# Patient Record
Sex: FEMALE | Race: WHITE | NOT HISPANIC OR LATINO | Employment: UNEMPLOYED | URBAN - METROPOLITAN AREA
[De-identification: names, ages, dates, MRNs, and addresses within clinical notes are randomized per-mention and may not be internally consistent; named-entity substitution may affect disease eponyms.]

---

## 2020-12-15 ENCOUNTER — APPOINTMENT (OUTPATIENT)
Dept: GENERAL RADIOLOGY | Age: 55
End: 2020-12-15
Attending: EMERGENCY MEDICINE

## 2020-12-15 ENCOUNTER — HOSPITAL ENCOUNTER (OUTPATIENT)
Age: 55
Discharge: HOME | End: 2020-12-15
Attending: EMERGENCY MEDICINE

## 2020-12-15 VITALS
BODY MASS INDEX: 26.33 KG/M2 | SYSTOLIC BLOOD PRESSURE: 140 MMHG | WEIGHT: 158 LBS | DIASTOLIC BLOOD PRESSURE: 72 MMHG | OXYGEN SATURATION: 97 % | HEIGHT: 65 IN | HEART RATE: 100 BPM | TEMPERATURE: 98.8 F | RESPIRATION RATE: 20 BRPM

## 2020-12-15 DIAGNOSIS — S40.012A CONTUSION OF LEFT SHOULDER, INITIAL ENCOUNTER: Primary | ICD-10-CM

## 2020-12-15 PROCEDURE — A4565 SLINGS: HCPCS | Performed by: EMERGENCY MEDICINE

## 2020-12-15 PROCEDURE — 99201 HB OP SERV MINOR ACUITY-NEW PT: CPT

## 2020-12-15 PROCEDURE — 73060 X-RAY EXAM OF HUMERUS: CPT

## 2020-12-15 PROCEDURE — 99203 OFFICE O/P NEW LOW 30 MIN: CPT | Performed by: EMERGENCY MEDICINE

## 2020-12-15 PROCEDURE — 73030 X-RAY EXAM OF SHOULDER: CPT

## 2020-12-15 RX ORDER — HYDROCODONE BITARTRATE AND ACETAMINOPHEN 5; 325 MG/1; MG/1
1-2 TABLET ORAL EVERY 4 HOURS PRN
Qty: 12 TABLET | Refills: 0 | Status: SHIPPED | OUTPATIENT
Start: 2020-12-15

## 2020-12-15 ASSESSMENT — PAIN SCALES - GENERAL: PAINLEVEL_OUTOF10: 9

## 2020-12-15 ASSESSMENT — PAIN DESCRIPTION - PAIN TYPE: TYPE: ACUTE PAIN

## 2023-08-14 ENCOUNTER — DOCUMENTATION ONLY (OUTPATIENT)
Dept: FAMILY MEDICINE | Facility: CLINIC | Age: 58
End: 2023-08-14
Payer: COMMERCIAL

## 2023-08-14 RX ORDER — FUROSEMIDE 40 MG/1
40 TABLET ORAL 4 TIMES DAILY
COMMUNITY
End: 2023-09-12 | Stop reason: SDUPTHER

## 2023-08-14 RX ORDER — METOPROLOL SUCCINATE 25 MG/1
25 TABLET, EXTENDED RELEASE ORAL 2 TIMES DAILY
COMMUNITY
End: 2023-09-12 | Stop reason: SDUPTHER

## 2023-08-14 RX ORDER — GABAPENTIN 800 MG/1
800 TABLET ORAL 4 TIMES DAILY
COMMUNITY
End: 2023-09-12 | Stop reason: SDUPTHER

## 2023-08-14 RX ORDER — LISINOPRIL 5 MG/1
5 TABLET ORAL DAILY
COMMUNITY
End: 2023-09-12 | Stop reason: SDUPTHER

## 2023-08-14 RX ORDER — CLOPIDOGREL BISULFATE 75 MG/1
75 TABLET ORAL DAILY
COMMUNITY
End: 2023-09-12 | Stop reason: SDUPTHER

## 2023-08-14 RX ORDER — ONDANSETRON 4 MG/1
8 TABLET, ORALLY DISINTEGRATING ORAL EVERY 6 HOURS PRN
COMMUNITY
End: 2023-09-18 | Stop reason: SDUPTHER

## 2023-08-14 RX ORDER — CYCLOBENZAPRINE HCL 10 MG
10 TABLET ORAL DAILY PRN
COMMUNITY
End: 2023-09-14 | Stop reason: SDUPTHER

## 2023-08-14 RX ORDER — CITALOPRAM 40 MG/1
40 TABLET, FILM COATED ORAL DAILY
COMMUNITY
End: 2023-09-12 | Stop reason: SDUPTHER

## 2023-08-14 RX ORDER — SEMAGLUTIDE 1.34 MG/ML
INJECTION, SOLUTION SUBCUTANEOUS
COMMUNITY
End: 2023-09-12 | Stop reason: SDUPTHER

## 2023-08-14 RX ORDER — EZETIMIBE 10 MG/1
10 TABLET ORAL DAILY
COMMUNITY
End: 2023-09-12 | Stop reason: SDUPTHER

## 2023-08-14 RX ORDER — TRAZODONE HYDROCHLORIDE 100 MG/1
100 TABLET ORAL NIGHTLY
COMMUNITY
End: 2023-09-12 | Stop reason: SDUPTHER

## 2023-09-12 DIAGNOSIS — E11.65 TYPE 2 DIABETES MELLITUS WITH HYPERGLYCEMIA, WITH LONG-TERM CURRENT USE OF INSULIN: Primary | ICD-10-CM

## 2023-09-12 DIAGNOSIS — Z79.4 TYPE 2 DIABETES MELLITUS WITH HYPERGLYCEMIA, WITH LONG-TERM CURRENT USE OF INSULIN: Primary | ICD-10-CM

## 2023-09-13 RX ORDER — CITALOPRAM 40 MG/1
40 TABLET, FILM COATED ORAL DAILY
Qty: 90 TABLET | Refills: 2 | Status: SHIPPED | OUTPATIENT
Start: 2023-09-13 | End: 2023-11-02 | Stop reason: SDUPTHER

## 2023-09-13 RX ORDER — GABAPENTIN 800 MG/1
800 TABLET ORAL 4 TIMES DAILY
Qty: 120 TABLET | Refills: 2 | Status: SHIPPED | OUTPATIENT
Start: 2023-09-13 | End: 2023-11-02 | Stop reason: SDUPTHER

## 2023-09-13 RX ORDER — FUROSEMIDE 40 MG/1
40 TABLET ORAL 4 TIMES DAILY
Qty: 120 TABLET | Refills: 2 | Status: SHIPPED | OUTPATIENT
Start: 2023-09-13 | End: 2023-11-02 | Stop reason: SDUPTHER

## 2023-09-13 RX ORDER — METOPROLOL SUCCINATE 25 MG/1
25 TABLET, EXTENDED RELEASE ORAL 2 TIMES DAILY
Qty: 180 TABLET | Refills: 2 | Status: SHIPPED | OUTPATIENT
Start: 2023-09-13 | End: 2023-11-02 | Stop reason: SDUPTHER

## 2023-09-13 RX ORDER — TRAZODONE HYDROCHLORIDE 100 MG/1
100 TABLET ORAL NIGHTLY
Qty: 90 TABLET | Refills: 2 | Status: SHIPPED | OUTPATIENT
Start: 2023-09-13 | End: 2023-11-02 | Stop reason: SDUPTHER

## 2023-09-13 RX ORDER — EZETIMIBE 10 MG/1
10 TABLET ORAL DAILY
Qty: 90 TABLET | Refills: 2 | Status: SHIPPED | OUTPATIENT
Start: 2023-09-13 | End: 2023-11-02 | Stop reason: SDUPTHER

## 2023-09-13 RX ORDER — INSULIN DEGLUDEC 100 U/ML
20 INJECTION, SOLUTION SUBCUTANEOUS NIGHTLY
Qty: 6 ML | Refills: 11 | Status: SHIPPED | OUTPATIENT
Start: 2023-09-13 | End: 2024-09-12

## 2023-09-13 RX ORDER — LISINOPRIL 5 MG/1
5 TABLET ORAL DAILY
Qty: 90 TABLET | Refills: 2 | Status: SHIPPED | OUTPATIENT
Start: 2023-09-13 | End: 2023-11-02 | Stop reason: SDUPTHER

## 2023-09-13 RX ORDER — CLOPIDOGREL BISULFATE 75 MG/1
75 TABLET ORAL DAILY
Qty: 90 TABLET | Refills: 2 | Status: SHIPPED | OUTPATIENT
Start: 2023-09-13 | End: 2023-11-02 | Stop reason: SDUPTHER

## 2023-09-13 RX ORDER — SEMAGLUTIDE 1.34 MG/ML
0.5 INJECTION, SOLUTION SUBCUTANEOUS
Qty: 2 EACH | Refills: 2 | Status: SHIPPED | OUTPATIENT
Start: 2023-09-13

## 2023-09-13 RX ORDER — INSULIN ASPART 100 [IU]/ML
INJECTION, SOLUTION INTRAVENOUS; SUBCUTANEOUS
Qty: 2 EACH | Refills: 2 | Status: SHIPPED | OUTPATIENT
Start: 2023-09-13 | End: 2024-03-15 | Stop reason: SDUPTHER

## 2023-09-14 RX ORDER — CYCLOBENZAPRINE HCL 10 MG
10 TABLET ORAL DAILY PRN
Qty: 90 TABLET | Refills: 2 | Status: SHIPPED | OUTPATIENT
Start: 2023-09-14 | End: 2023-11-07

## 2023-09-19 RX ORDER — ONDANSETRON 4 MG/1
8 TABLET, ORALLY DISINTEGRATING ORAL EVERY 6 HOURS PRN
Qty: 30 TABLET | Refills: 0 | Status: SHIPPED | OUTPATIENT
Start: 2023-09-19 | End: 2023-11-02 | Stop reason: SDUPTHER

## 2023-10-05 ENCOUNTER — TELEPHONE (OUTPATIENT)
Dept: FAMILY MEDICINE | Facility: CLINIC | Age: 58
End: 2023-10-05
Payer: MEDICARE

## 2023-10-05 DIAGNOSIS — B37.31 CANDIDIASIS OF GENITALIA IN FEMALE: Primary | ICD-10-CM

## 2023-10-05 RX ORDER — FLUCONAZOLE 150 MG/1
150 TABLET ORAL DAILY
Qty: 1 TABLET | Refills: 0 | Status: SHIPPED | OUTPATIENT
Start: 2023-10-05 | End: 2023-10-06

## 2023-10-13 ENCOUNTER — OFFICE VISIT (OUTPATIENT)
Dept: FAMILY MEDICINE | Facility: CLINIC | Age: 58
End: 2023-10-13
Payer: COMMERCIAL

## 2023-10-13 VITALS
OXYGEN SATURATION: 98 % | TEMPERATURE: 98 F | RESPIRATION RATE: 16 BRPM | HEART RATE: 92 BPM | BODY MASS INDEX: 33.27 KG/M2 | DIASTOLIC BLOOD PRESSURE: 60 MMHG | SYSTOLIC BLOOD PRESSURE: 120 MMHG | HEIGHT: 66 IN | WEIGHT: 207 LBS

## 2023-10-13 DIAGNOSIS — R30.0 DYSURIA: ICD-10-CM

## 2023-10-13 DIAGNOSIS — N30.01 ACUTE CYSTITIS WITH HEMATURIA: Primary | ICD-10-CM

## 2023-10-13 LAB
BILIRUB SERPL-MCNC: NEGATIVE MG/DL
BLOOD URINE, POC: NORMAL
CLARITY, POC UA: NORMAL
COLOR, POC UA: NORMAL
GLUCOSE UR QL STRIP: NORMAL
KETONES UR QL STRIP: NEGATIVE
LEUKOCYTE ESTERASE URINE, POC: NORMAL
NITRITE, POC UA: NEGATIVE
PH, POC UA: 6
PROTEIN, POC: NORMAL
SPECIFIC GRAVITY, POC UA: 1.01
UROBILINOGEN, POC UA: 0.2

## 2023-10-13 PROCEDURE — 1159F PR MEDICATION LIST DOCUMENTED IN MEDICAL RECORD: ICD-10-PCS | Mod: CPTII,,, | Performed by: NURSE PRACTITIONER

## 2023-10-13 PROCEDURE — 4010F PR ACE/ARB THEARPY RXD/TAKEN: ICD-10-PCS | Mod: CPTII,,, | Performed by: NURSE PRACTITIONER

## 2023-10-13 PROCEDURE — 3008F BODY MASS INDEX DOCD: CPT | Mod: CPTII,,, | Performed by: NURSE PRACTITIONER

## 2023-10-13 PROCEDURE — 87077 CULTURE AEROBIC IDENTIFY: CPT | Mod: ,,, | Performed by: CLINICAL MEDICAL LABORATORY

## 2023-10-13 PROCEDURE — 3078F DIAST BP <80 MM HG: CPT | Mod: CPTII,,, | Performed by: NURSE PRACTITIONER

## 2023-10-13 PROCEDURE — 96372 PR INJECTION,THERAP/PROPH/DIAG2ST, IM OR SUBCUT: ICD-10-PCS | Mod: ,,, | Performed by: NURSE PRACTITIONER

## 2023-10-13 PROCEDURE — 3008F PR BODY MASS INDEX (BMI) DOCUMENTED: ICD-10-PCS | Mod: CPTII,,, | Performed by: NURSE PRACTITIONER

## 2023-10-13 PROCEDURE — 3074F PR MOST RECENT SYSTOLIC BLOOD PRESSURE < 130 MM HG: ICD-10-PCS | Mod: CPTII,,, | Performed by: NURSE PRACTITIONER

## 2023-10-13 PROCEDURE — 87086 CULTURE, URINE: ICD-10-PCS | Mod: ,,, | Performed by: CLINICAL MEDICAL LABORATORY

## 2023-10-13 PROCEDURE — 81002 POCT URINE DIPSTICK WITHOUT MICROSCOPE: ICD-10-PCS | Mod: ,,, | Performed by: NURSE PRACTITIONER

## 2023-10-13 PROCEDURE — 87077 CULTURE, URINE: ICD-10-PCS | Mod: ,,, | Performed by: CLINICAL MEDICAL LABORATORY

## 2023-10-13 PROCEDURE — 1159F MED LIST DOCD IN RCRD: CPT | Mod: CPTII,,, | Performed by: NURSE PRACTITIONER

## 2023-10-13 PROCEDURE — 4010F ACE/ARB THERAPY RXD/TAKEN: CPT | Mod: CPTII,,, | Performed by: NURSE PRACTITIONER

## 2023-10-13 PROCEDURE — 99213 PR OFFICE/OUTPT VISIT, EST, LEVL III, 20-29 MIN: ICD-10-PCS | Mod: 25,,, | Performed by: NURSE PRACTITIONER

## 2023-10-13 PROCEDURE — 3074F SYST BP LT 130 MM HG: CPT | Mod: CPTII,,, | Performed by: NURSE PRACTITIONER

## 2023-10-13 PROCEDURE — 81002 URINALYSIS NONAUTO W/O SCOPE: CPT | Mod: ,,, | Performed by: NURSE PRACTITIONER

## 2023-10-13 PROCEDURE — 1160F PR REVIEW ALL MEDS BY PRESCRIBER/CLIN PHARMACIST DOCUMENTED: ICD-10-PCS | Mod: CPTII,,, | Performed by: NURSE PRACTITIONER

## 2023-10-13 PROCEDURE — 1160F RVW MEDS BY RX/DR IN RCRD: CPT | Mod: CPTII,,, | Performed by: NURSE PRACTITIONER

## 2023-10-13 PROCEDURE — 96372 THER/PROPH/DIAG INJ SC/IM: CPT | Mod: ,,, | Performed by: NURSE PRACTITIONER

## 2023-10-13 PROCEDURE — 99213 OFFICE O/P EST LOW 20 MIN: CPT | Mod: 25,,, | Performed by: NURSE PRACTITIONER

## 2023-10-13 PROCEDURE — 3078F PR MOST RECENT DIASTOLIC BLOOD PRESSURE < 80 MM HG: ICD-10-PCS | Mod: CPTII,,, | Performed by: NURSE PRACTITIONER

## 2023-10-13 PROCEDURE — 87086 URINE CULTURE/COLONY COUNT: CPT | Mod: ,,, | Performed by: CLINICAL MEDICAL LABORATORY

## 2023-10-13 RX ORDER — CEFTRIAXONE 1 G/1
1 INJECTION, POWDER, FOR SOLUTION INTRAMUSCULAR; INTRAVENOUS
Status: COMPLETED | OUTPATIENT
Start: 2023-10-13 | End: 2023-10-13

## 2023-10-13 RX ORDER — CYCLOBENZAPRINE HCL 10 MG
10 TABLET ORAL DAILY PRN
Qty: 90 TABLET | Refills: 2 | Status: CANCELLED | OUTPATIENT
Start: 2023-10-13

## 2023-10-13 RX ORDER — SULFAMETHOXAZOLE AND TRIMETHOPRIM 800; 160 MG/1; MG/1
1 TABLET ORAL 2 TIMES DAILY
Qty: 10 TABLET | Refills: 0 | Status: SHIPPED | OUTPATIENT
Start: 2023-10-13 | End: 2023-10-18

## 2023-10-13 RX ADMIN — CEFTRIAXONE 1 G: 1 INJECTION, POWDER, FOR SOLUTION INTRAMUSCULAR; INTRAVENOUS at 11:10

## 2023-10-13 NOTE — PROGRESS NOTES
"    Subjective:      Kirk Villar is a 57 y.o. female who complains of dysuria and pain in the lower abdomen. She has had symptoms for several days. Patient also complains of back pain. Patient denies fever and vaginal discharge. Patient does not have a history of recurrent UTI. Patient does not have a history of pyelonephritis.     Review of Systems  Pertinent items are noted in HPI.      Objective:      /60 (BP Location: Left arm, Patient Position: Sitting, BP Method: Large (Manual))   Pulse 92   Temp 98.2 °F (36.8 °C) (Oral)   Resp 16   Ht 5' 5.5" (1.664 m)   Wt 93.9 kg (207 lb)   SpO2 98%   BMI 33.92 kg/m²   General appearance: alert, appears stated age, and cooperative  Back: symmetric, no curvature. ROM normal. No CVA tenderness.  Lungs: clear to auscultation bilaterally  Heart: regular rate and rhythm, S1, S2 normal, no murmur, click, rub or gallop  Extremities: extremities normal, atraumatic, no cyanosis or edema  Skin: Skin color, texture, turgor normal. No rashes or lesions  Lymph nodes: Cervical, supraclavicular, and axillary nodes normal.  Neurologic: Alert and oriented X 3, normal strength and tone. Normal symmetric reflexes. Normal coordination and gait    Laboratory:   Urine dipstick:  see results .    Micro exam: not done.      Assessment:      Acute cystitis and UTI       Plan:      Medications: TMP/SMX.  Maintain adequate hydration.  Follow up if symptoms not improving, and as needed.   "

## 2023-10-13 NOTE — PATIENT INSTRUCTIONS
Rx as directed. Increase water intake. C&S sent, will call if results warrant a change in antibiotics. For females, always wipe front to back. RTC if worsening or persisting of symptoms after 48-72 hours of antibiotic therapy.      Thank you for enrolling in [facility]. Please follow the instructions below to securely access your online medical record. My allows you to send messages to your doctor, view your test results, renew your prescriptions, schedule appointments, and more.     How Do I Sign Up?  In your Internet browser, go to [email or web address].  In the lower right of the page, click the Sign Up Now link located under the New User? Title.  Enter your [facility] Access Code exactly as it appears below. You will not need to use this code after youve completed the sign-up process. If you do not sign up before the expiration date, you must request a new code.  [facility] Access Code: [email or web address]  Expires: 11/27/2023 10:47 AM    Enter Date of Birth (mm/dd/yyyy) as indicated and click the Next button. You will be taken to the next sign-up page.  Create a [facility] ID. This will be your new [facility] login ID and cannot be changed, so think of one that is secure and easy to remember.  Create a [facility] password.  Your password must be at least 8 characters long and contain at least 1 letter and 1 number.  You can change your password at any time.  Enter your Password Reset Question and Answer, then click the Next button.   Enter your e-mail address. You will receive e-mail notification when new information is available in [facility].  Click Sign Up. You can now view your medical record.     Additional Information  If you have questions, you can email [email or web address] or call [phone]  to talk to our [facility] staff. Remember, [facility] is NOT to be used for urgent needs. For medical emergencies, dial 911.

## 2023-10-15 LAB — UA COMPLETE W REFLEX CULTURE PNL UR: ABNORMAL

## 2023-10-18 DIAGNOSIS — N30.01 ACUTE CYSTITIS WITH HEMATURIA: Primary | ICD-10-CM

## 2023-10-18 RX ORDER — AMOXICILLIN 500 MG/1
500 TABLET, FILM COATED ORAL EVERY 12 HOURS
Qty: 14 TABLET | Refills: 0 | Status: SHIPPED | OUTPATIENT
Start: 2023-10-18 | End: 2023-10-25

## 2023-11-02 ENCOUNTER — OFFICE VISIT (OUTPATIENT)
Dept: FAMILY MEDICINE | Facility: CLINIC | Age: 58
End: 2023-11-02
Payer: COMMERCIAL

## 2023-11-02 VITALS
WEIGHT: 203 LBS | BODY MASS INDEX: 32.62 KG/M2 | RESPIRATION RATE: 16 BRPM | SYSTOLIC BLOOD PRESSURE: 110 MMHG | HEIGHT: 66 IN | DIASTOLIC BLOOD PRESSURE: 60 MMHG | OXYGEN SATURATION: 98 % | HEART RATE: 88 BPM | TEMPERATURE: 98 F

## 2023-11-02 DIAGNOSIS — H92.02 PAIN IN LEFT EAR: ICD-10-CM

## 2023-11-02 DIAGNOSIS — Z79.4 TYPE 2 DIABETES MELLITUS WITH HYPERGLYCEMIA, WITH LONG-TERM CURRENT USE OF INSULIN: Primary | ICD-10-CM

## 2023-11-02 DIAGNOSIS — R25.3 MUSCLE TWITCHING: ICD-10-CM

## 2023-11-02 DIAGNOSIS — E11.65 TYPE 2 DIABETES MELLITUS WITH HYPERGLYCEMIA, WITH LONG-TERM CURRENT USE OF INSULIN: Primary | ICD-10-CM

## 2023-11-02 PROCEDURE — 4010F ACE/ARB THERAPY RXD/TAKEN: CPT | Mod: CPTII,,, | Performed by: NURSE PRACTITIONER

## 2023-11-02 PROCEDURE — 3074F PR MOST RECENT SYSTOLIC BLOOD PRESSURE < 130 MM HG: ICD-10-PCS | Mod: CPTII,,, | Performed by: NURSE PRACTITIONER

## 2023-11-02 PROCEDURE — 3074F SYST BP LT 130 MM HG: CPT | Mod: CPTII,,, | Performed by: NURSE PRACTITIONER

## 2023-11-02 PROCEDURE — 99213 OFFICE O/P EST LOW 20 MIN: CPT | Mod: ,,, | Performed by: NURSE PRACTITIONER

## 2023-11-02 PROCEDURE — 3008F PR BODY MASS INDEX (BMI) DOCUMENTED: ICD-10-PCS | Mod: CPTII,,, | Performed by: NURSE PRACTITIONER

## 2023-11-02 PROCEDURE — 99213 PR OFFICE/OUTPT VISIT, EST, LEVL III, 20-29 MIN: ICD-10-PCS | Mod: ,,, | Performed by: NURSE PRACTITIONER

## 2023-11-02 PROCEDURE — 3078F DIAST BP <80 MM HG: CPT | Mod: CPTII,,, | Performed by: NURSE PRACTITIONER

## 2023-11-02 PROCEDURE — 4010F PR ACE/ARB THEARPY RXD/TAKEN: ICD-10-PCS | Mod: CPTII,,, | Performed by: NURSE PRACTITIONER

## 2023-11-02 PROCEDURE — 3078F PR MOST RECENT DIASTOLIC BLOOD PRESSURE < 80 MM HG: ICD-10-PCS | Mod: CPTII,,, | Performed by: NURSE PRACTITIONER

## 2023-11-02 PROCEDURE — 3008F BODY MASS INDEX DOCD: CPT | Mod: CPTII,,, | Performed by: NURSE PRACTITIONER

## 2023-11-02 RX ORDER — TRAZODONE HYDROCHLORIDE 100 MG/1
100 TABLET ORAL NIGHTLY
Qty: 90 TABLET | Refills: 2 | Status: SHIPPED | OUTPATIENT
Start: 2023-11-02 | End: 2024-03-15 | Stop reason: SDUPTHER

## 2023-11-02 RX ORDER — CLOPIDOGREL BISULFATE 75 MG/1
75 TABLET ORAL DAILY
Qty: 90 TABLET | Refills: 2 | Status: SHIPPED | OUTPATIENT
Start: 2023-11-02 | End: 2024-03-15 | Stop reason: SDUPTHER

## 2023-11-02 RX ORDER — FUROSEMIDE 40 MG/1
40 TABLET ORAL 4 TIMES DAILY
Qty: 120 TABLET | Refills: 2 | Status: SHIPPED | OUTPATIENT
Start: 2023-11-02 | End: 2024-02-28

## 2023-11-02 RX ORDER — LISINOPRIL 5 MG/1
5 TABLET ORAL DAILY
Qty: 90 TABLET | Refills: 2 | Status: SHIPPED | OUTPATIENT
Start: 2023-11-02 | End: 2024-03-12

## 2023-11-02 RX ORDER — CITALOPRAM 40 MG/1
40 TABLET, FILM COATED ORAL DAILY
Qty: 90 TABLET | Refills: 2 | Status: SHIPPED | OUTPATIENT
Start: 2023-11-02 | End: 2024-03-15 | Stop reason: SDUPTHER

## 2023-11-02 RX ORDER — EZETIMIBE 10 MG/1
10 TABLET ORAL DAILY
Qty: 90 TABLET | Refills: 2 | Status: SHIPPED | OUTPATIENT
Start: 2023-11-02 | End: 2024-03-15 | Stop reason: SDUPTHER

## 2023-11-02 RX ORDER — CLOPIDOGREL BISULFATE 75 MG/1
75 TABLET ORAL DAILY
Qty: 90 TABLET | Refills: 2 | Status: SHIPPED | OUTPATIENT
Start: 2023-11-02 | End: 2023-11-02 | Stop reason: SDUPTHER

## 2023-11-02 RX ORDER — ONDANSETRON 4 MG/1
8 TABLET, ORALLY DISINTEGRATING ORAL EVERY 6 HOURS PRN
Qty: 30 TABLET | Refills: 0 | Status: SHIPPED | OUTPATIENT
Start: 2023-11-02

## 2023-11-02 RX ORDER — METOPROLOL SUCCINATE 25 MG/1
25 TABLET, EXTENDED RELEASE ORAL 2 TIMES DAILY
Qty: 180 TABLET | Refills: 2 | Status: SHIPPED | OUTPATIENT
Start: 2023-11-02 | End: 2024-03-15 | Stop reason: SDUPTHER

## 2023-11-02 RX ORDER — GABAPENTIN 800 MG/1
800 TABLET ORAL 4 TIMES DAILY
Qty: 120 TABLET | Refills: 2 | Status: SHIPPED | OUTPATIENT
Start: 2023-11-02 | End: 2024-03-15 | Stop reason: SDUPTHER

## 2023-11-07 ENCOUNTER — OFFICE VISIT (OUTPATIENT)
Dept: FAMILY MEDICINE | Facility: CLINIC | Age: 58
End: 2023-11-07
Payer: COMMERCIAL

## 2023-11-07 VITALS
DIASTOLIC BLOOD PRESSURE: 68 MMHG | TEMPERATURE: 98 F | OXYGEN SATURATION: 97 % | SYSTOLIC BLOOD PRESSURE: 128 MMHG | HEART RATE: 84 BPM | BODY MASS INDEX: 32.14 KG/M2 | WEIGHT: 200 LBS | RESPIRATION RATE: 20 BRPM | HEIGHT: 66 IN

## 2023-11-07 DIAGNOSIS — F33.9 EPISODE OF RECURRENT MAJOR DEPRESSIVE DISORDER, UNSPECIFIED DEPRESSION EPISODE SEVERITY: ICD-10-CM

## 2023-11-07 DIAGNOSIS — F41.1 GENERALIZED ANXIETY DISORDER: Primary | ICD-10-CM

## 2023-11-07 LAB

## 2023-11-07 PROCEDURE — 99213 PR OFFICE/OUTPT VISIT, EST, LEVL III, 20-29 MIN: ICD-10-PCS | Mod: ,,,

## 2023-11-07 PROCEDURE — 3078F PR MOST RECENT DIASTOLIC BLOOD PRESSURE < 80 MM HG: ICD-10-PCS | Mod: CPTII,,,

## 2023-11-07 PROCEDURE — 4010F ACE/ARB THERAPY RXD/TAKEN: CPT | Mod: CPTII,,,

## 2023-11-07 PROCEDURE — 3078F DIAST BP <80 MM HG: CPT | Mod: CPTII,,,

## 2023-11-07 PROCEDURE — 4010F PR ACE/ARB THEARPY RXD/TAKEN: ICD-10-PCS | Mod: CPTII,,,

## 2023-11-07 PROCEDURE — 1159F MED LIST DOCD IN RCRD: CPT | Mod: CPTII,,,

## 2023-11-07 PROCEDURE — 3008F BODY MASS INDEX DOCD: CPT | Mod: CPTII,,,

## 2023-11-07 PROCEDURE — 1160F RVW MEDS BY RX/DR IN RCRD: CPT | Mod: CPTII,,,

## 2023-11-07 PROCEDURE — 1160F PR REVIEW ALL MEDS BY PRESCRIBER/CLIN PHARMACIST DOCUMENTED: ICD-10-PCS | Mod: CPTII,,,

## 2023-11-07 PROCEDURE — 80305 DRUG TEST PRSMV DIR OPT OBS: CPT | Mod: QW,,,

## 2023-11-07 PROCEDURE — 80305 POCT URINE DRUG SCREEN PRESUMP: ICD-10-PCS | Mod: QW,,,

## 2023-11-07 PROCEDURE — 3074F SYST BP LT 130 MM HG: CPT | Mod: CPTII,,,

## 2023-11-07 PROCEDURE — 99213 OFFICE O/P EST LOW 20 MIN: CPT | Mod: ,,,

## 2023-11-07 PROCEDURE — 3008F PR BODY MASS INDEX (BMI) DOCUMENTED: ICD-10-PCS | Mod: CPTII,,,

## 2023-11-07 PROCEDURE — 1159F PR MEDICATION LIST DOCUMENTED IN MEDICAL RECORD: ICD-10-PCS | Mod: CPTII,,,

## 2023-11-07 PROCEDURE — 3074F PR MOST RECENT SYSTOLIC BLOOD PRESSURE < 130 MM HG: ICD-10-PCS | Mod: CPTII,,,

## 2023-11-07 RX ORDER — ALPRAZOLAM 0.25 MG/1
0.25 TABLET ORAL 2 TIMES DAILY PRN
Qty: 60 TABLET | Refills: 0 | Status: SHIPPED | OUTPATIENT
Start: 2023-11-07 | End: 2023-12-07

## 2023-11-07 RX ORDER — FLUCONAZOLE 150 MG/1
150 TABLET ORAL DAILY
Qty: 2 TABLET | Refills: 0 | Status: SHIPPED | OUTPATIENT
Start: 2023-11-07 | End: 2023-11-09

## 2023-11-07 NOTE — PROGRESS NOTES
"Subjective     Patient ID: Kirk Villar is a 58 y.o. female.    Chief Complaint: Anxiety (Comes and goes with life events), Emesis (X 4-5 days), and Depression    MG is a 58 year old female that presents today for complaints of severe anxiety. Patient reports that she is going through a "bad breakup" from her current relationship. She reports not sleeping well and is not eating. She is tearful throughout the examination. She denies thoughts of self harm or thoughts to harm others. She took trazodone PRN for sleep. She is currently on celexa daily for major depressive disorder. She reports going through a divorce 15 years ago and experienced severe anxiety at that time as well. She reports that she was on xanax PRN for that time for a couple of weeks. Additionally, she has complaints of a yeast infection. She reports recently taking antibiotics for a UTI.    Anxiety  Symptoms include nervous/anxious behavior. Patient reports no chest pain, dizziness, palpitations or shortness of breath.       Emesis   Pertinent negatives include no abdominal pain, chest pain, chills, coughing, dizziness, fever, headaches or myalgias.   DepressionPatient presents with the following symptoms: nervousness/anxiety.  Patient is not experiencing: palpitations and shortness of breath.        Review of Systems   Constitutional:  Negative for activity change, appetite change, chills and fever.   HENT:  Negative for ear pain, hearing loss, trouble swallowing and voice change.    Eyes:  Negative for visual disturbance.   Respiratory:  Negative for apnea, cough, chest tightness and shortness of breath.    Cardiovascular:  Negative for chest pain, palpitations and leg swelling.   Gastrointestinal:  Positive for vomiting. Negative for abdominal pain, blood in stool, change in bowel habit and reflux.   Genitourinary:  Negative for bladder incontinence, difficulty urinating and flank pain.   Musculoskeletal:  Negative for back pain, gait " problem, joint swelling and myalgias.   Integumentary:  Negative for color change and pallor.   Neurological:  Negative for dizziness, weakness, numbness and headaches.   Psychiatric/Behavioral:  Positive for depression, dysphoric mood and sleep disturbance. The patient is nervous/anxious.           Objective     Physical Exam  Vitals and nursing note reviewed.   Constitutional:       Appearance: Normal appearance.   HENT:      Head: Normocephalic and atraumatic.      Nose: Nose normal.      Mouth/Throat:      Mouth: Mucous membranes are moist.   Eyes:      Extraocular Movements: Extraocular movements intact.      Conjunctiva/sclera: Conjunctivae normal.      Pupils: Pupils are equal, round, and reactive to light.   Cardiovascular:      Rate and Rhythm: Normal rate and regular rhythm.      Pulses: Normal pulses.      Heart sounds: Normal heart sounds.   Pulmonary:      Effort: Pulmonary effort is normal.      Breath sounds: Normal breath sounds.   Abdominal:      General: Abdomen is flat. Bowel sounds are normal.      Palpations: Abdomen is soft.   Musculoskeletal:         General: Normal range of motion.      Cervical back: Normal range of motion.   Skin:     General: Skin is warm and dry.   Neurological:      General: No focal deficit present.      Mental Status: She is alert and oriented to person, place, and time.   Psychiatric:         Mood and Affect: Mood is anxious and depressed. Affect is tearful.         Behavior: Behavior normal.         Thought Content: Thought content normal.          Assessment and Plan     1. Generalized anxiety disorder  -     ALPRAZolam (XANAX) 0.25 MG tablet; Take 1 tablet (0.25 mg total) by mouth 2 (two) times daily as needed for Anxiety.  Dispense: 60 tablet; Refill: 0  -     POCT Urine Drug Screen Presump    2. Episode of recurrent major depressive disorder, unspecified depression episode severity    Other orders  -     fluconazole (DIFLUCAN) 150 MG Tab; Take 1 tablet (150 mg  total) by mouth once daily. Take one tablet by mouth at onset of symptoms, may repeat in 72 hours if symptoms persist. for 2 doses  Dispense: 2 tablet; Refill: 0        UDS appropriate,  reviewed and appropriate  Short term use of xanax PRN for severe anxiety  Recommend CBT       Follow up in about 4 weeks (around 12/5/2023) for Follow up of Anxiety with SAUL Hairston.

## 2023-11-08 NOTE — PROGRESS NOTES
Anabell Mendez NP   [address] [place] [address]     PATIENT NAME: Kirk Villar  : 1965  DATE: 23  MRN: [id number]      Billing Provider: Anabell Mendez NP  Level of Service:   Patient PCP Information       Provider PCP Type    Primary Doctor No General            Reason for Visit / Chief Complaint: Medication Refill (She is c/o some jerking motions in her extremities.Still having left ear pain.          )       Update PCP  Update Chief Complaint         History of Present Illness / Problem Focused Workflow     Kirk Villar presents to the clinic with Medication Refill (She is c/o some jerking motions in her extremities.Still having left ear pain.          )     Medication Refill  Associated symptoms include fatigue. Pertinent negatives include no abdominal pain, change in bowel habit, chest pain, chills, coughing, fever, headaches, joint swelling, myalgias, numbness or weakness.     Patient presents today for medication refills. She also reports uncontrolled BG. She is out of ozempic sample and has lost the fax number to send in paperwork for patient assistance. She reports numbers being 400 fasting. She is not dieting or exercising. She reports she is only doing sliding scale insulin. She is not doing her tresiba. Upon inquiry, she states she did not know she was supposed to do that too.   She reports left ear pain. She is not taking any medication for her symptoms.   She reports random jerking motions in extremities. Denies pain. On and off.     Review of Systems     Review of Systems   Constitutional:  Positive for fatigue. Negative for activity change, appetite change, chills and fever.   HENT:  Positive for ear pain. Negative for hearing loss, trouble swallowing and voice change.    Eyes:  Negative for visual disturbance.   Respiratory:  Negative for apnea, cough, chest tightness and shortness of breath.    Cardiovascular:  Negative for chest pain, palpitations and leg swelling.    Gastrointestinal:  Negative for abdominal pain, blood in stool, change in bowel habit and reflux.   Genitourinary:  Negative for bladder incontinence, difficulty urinating and flank pain.   Musculoskeletal:  Negative for back pain, gait problem, joint swelling and myalgias.   Integumentary:  Negative for color change and pallor.   Neurological:  Negative for dizziness, weakness, numbness and headaches.   Psychiatric/Behavioral:  Negative for sleep disturbance. The patient is not nervous/anxious.         Medical / Social / Family History     Past Medical History:   Diagnosis Date    Depression     Diabetes mellitus, type 2        History reviewed. No pertinent surgical history.    Social History  Ms.  reports that she has quit smoking. Her smoking use included cigarettes. She has never used smokeless tobacco. She reports current alcohol use. She reports that she does not use drugs.    Family History  Ms.'s family history is not on file.    Medications and Allergies     Medications  Outpatient Medications Marked as Taking for the 11/2/23 encounter (Office Visit) with Anabell Mendez NP   Medication Sig Dispense Refill    blood sugar diagnostic (ACCU-CHEK GUIDE TEST STRIPS) Strp 1 strip by Misc.(Non-Drug; Combo Route) route 3 (three) times daily. 100 strip 11    insulin aspart U-100 (NOVOLOG FLEXPEN U-100 INSULIN) 100 unit/mL (3 mL) InPn pen Inject 5-10 minutes before meals per sliding scale 2 each 2    insulin degludec (TRESIBA FLEXTOUCH U-100) 100 unit/mL (3 mL) insulin pen Inject 20 Units into the skin every evening. 6 mL 11    rivaroxaban (XARELTO) 20 mg Tab Take 1 tablet (20 mg total) by mouth daily with dinner or evening meal. 90 tablet 2    semaglutide (OZEMPIC) 0.25 mg or 0.5 mg(2 mg/1.5 mL) pen injector Inject 0.5 mg into the skin every 7 days. 2 each 2    [DISCONTINUED] citalopram (CELEXA) 40 MG tablet Take 1 tablet (40 mg total) by mouth once daily. 90 tablet 2    [DISCONTINUED] clopidogreL (PLAVIX) 75 mg  "tablet Take 1 tablet (75 mg total) by mouth once daily. 90 tablet 2    [DISCONTINUED] cyclobenzaprine (FLEXERIL) 10 MG tablet Take 1 tablet (10 mg total) by mouth daily as needed for Muscle spasms. (Patient taking differently: Take 10 mg by mouth 2 (two) times daily as needed for Muscle spasms.) 90 tablet 2    [DISCONTINUED] ezetimibe (ZETIA) 10 mg tablet Take 1 tablet (10 mg total) by mouth once daily. 90 tablet 2    [DISCONTINUED] furosemide (LASIX) 40 MG tablet Take 1 tablet (40 mg total) by mouth 4 (four) times daily. 120 tablet 2    [DISCONTINUED] gabapentin (NEURONTIN) 800 MG tablet Take 1 tablet (800 mg total) by mouth 4 (four) times daily. 120 tablet 2    [DISCONTINUED] lisinopriL (PRINIVIL,ZESTRIL) 5 MG tablet Take 1 tablet (5 mg total) by mouth once daily. 90 tablet 2    [DISCONTINUED] metoprolol succinate (TOPROL-XL) 25 MG 24 hr tablet Take 1 tablet (25 mg total) by mouth 2 (two) times daily. 180 tablet 2    [DISCONTINUED] ondansetron (ZOFRAN-ODT) 4 MG TbDL Take 2 tablets (8 mg total) by mouth every 6 (six) hours as needed (nausea). 30 tablet 0    [DISCONTINUED] traZODone (DESYREL) 100 MG tablet Take 1 tablet (100 mg total) by mouth every evening. 90 tablet 2       Allergies  Review of patient's allergies indicates:  No Known Allergies    Physical Examination   /60 (BP Location: Left arm, Patient Position: Sitting, BP Method: Large (Manual))   Pulse 88   Temp 97.8 °F (36.6 °C) (Oral)   Resp 16   Ht 5' 5.5" (1.664 m)   Wt 92.1 kg (203 lb)   SpO2 98%   BMI 33.27 kg/m²    Physical Exam  Vitals and nursing note reviewed.   Constitutional:       Appearance: Normal appearance. She is normal weight.   HENT:      Head: Normocephalic.      Ears:      Comments: Left TM effused without erythema or bulging     Nose: Nose normal.      Mouth/Throat:      Mouth: Mucous membranes are moist.   Eyes:      Extraocular Movements: Extraocular movements intact.      Conjunctiva/sclera: Conjunctivae normal.      " Pupils: Pupils are equal, round, and reactive to light.   Cardiovascular:      Rate and Rhythm: Normal rate and regular rhythm.      Pulses: Normal pulses.      Heart sounds: Normal heart sounds.   Pulmonary:      Effort: Pulmonary effort is normal.      Breath sounds: Normal breath sounds.   Abdominal:      General: Abdomen is flat. Bowel sounds are normal.      Palpations: Abdomen is soft.   Musculoskeletal:         General: Normal range of motion.      Cervical back: Normal range of motion and neck supple.   Skin:     General: Skin is warm and dry.      Capillary Refill: Capillary refill takes less than 2 seconds.   Neurological:      General: No focal deficit present.      Mental Status: She is alert and oriented to person, place, and time.   Psychiatric:         Behavior: Behavior normal.         Thought Content: Thought content normal.          Assessment and Plan (including Health Maintenance)      Problem List  Smart Sets  Document Outside HM   :    Plan:   Continue sliding scale insulin AC. Do long acting insulin (tresiba) HS.   Get patient assistance paperwork sent ASAP to continue ozempic.   Encouraged diet and exercise.  Antihistamine for fluid on ear.  RTC as needed or in 3 months for follow up. Sooner if BG is not resolving.        Health Maintenance Due   Topic Date Due    Hepatitis C Screening  Never done    Cervical Cancer Screening  Never done    COVID-19 Vaccine (1) Never done    HIV Screening  Never done    TETANUS VACCINE  Never done    Mammogram  Never done    Colorectal Cancer Screening  Never done    Shingles Vaccine (1 of 2) Never done    Influenza Vaccine (1) Never done    RSV Vaccine (Age 60+) (1 - 1-dose 60+ series) Never done       Problem List Items Addressed This Visit    None      Health Maintenance Topics with due status: Not Due       Topic Last Completion Date    Hemoglobin A1c (Diabetic Prevention Screening) 11/30/2022    Lipid Panel 11/30/2022       No future appointments.          Signature:  Anabell Mendez, TEJA      [address]  [place] [address]    Date of encounter: 11/2/23

## 2024-01-02 ENCOUNTER — TELEPHONE (OUTPATIENT)
Dept: FAMILY MEDICINE | Facility: CLINIC | Age: 59
End: 2024-01-02

## 2024-01-02 NOTE — TELEPHONE ENCOUNTER
----- Message from Apryl Jj sent at 12/29/2023 10:47 AM CST -----  Regarding: refill  Patient needs a refill on her Inhaler sent to [facility] [address] [place], [place] [address] [id number] [phone]  She is out of town and want be back until Next Friday

## 2024-01-05 ENCOUNTER — OFFICE VISIT (OUTPATIENT)
Dept: FAMILY MEDICINE | Facility: CLINIC | Age: 59
End: 2024-01-05
Payer: COMMERCIAL

## 2024-01-05 VITALS
HEART RATE: 113 BPM | OXYGEN SATURATION: 95 % | BODY MASS INDEX: 33.27 KG/M2 | RESPIRATION RATE: 20 BRPM | DIASTOLIC BLOOD PRESSURE: 74 MMHG | HEIGHT: 66 IN | SYSTOLIC BLOOD PRESSURE: 130 MMHG | WEIGHT: 207 LBS

## 2024-01-05 DIAGNOSIS — J06.9 ACUTE UPPER RESPIRATORY INFECTION: Primary | ICD-10-CM

## 2024-01-05 DIAGNOSIS — J40 BRONCHITIS: ICD-10-CM

## 2024-01-05 PROCEDURE — 1159F MED LIST DOCD IN RCRD: CPT | Mod: CPTII,,, | Performed by: NURSE PRACTITIONER

## 2024-01-05 PROCEDURE — 3078F DIAST BP <80 MM HG: CPT | Mod: CPTII,,, | Performed by: NURSE PRACTITIONER

## 2024-01-05 PROCEDURE — 1160F RVW MEDS BY RX/DR IN RCRD: CPT | Mod: CPTII,,, | Performed by: NURSE PRACTITIONER

## 2024-01-05 PROCEDURE — 99213 OFFICE O/P EST LOW 20 MIN: CPT | Mod: 25,,, | Performed by: NURSE PRACTITIONER

## 2024-01-05 PROCEDURE — 3075F SYST BP GE 130 - 139MM HG: CPT | Mod: CPTII,,, | Performed by: NURSE PRACTITIONER

## 2024-01-05 PROCEDURE — 3008F BODY MASS INDEX DOCD: CPT | Mod: CPTII,,, | Performed by: NURSE PRACTITIONER

## 2024-01-05 PROCEDURE — 96372 THER/PROPH/DIAG INJ SC/IM: CPT | Mod: ,,, | Performed by: NURSE PRACTITIONER

## 2024-01-05 RX ORDER — DEXAMETHASONE SODIUM PHOSPHATE 4 MG/ML
8 INJECTION, SOLUTION INTRA-ARTICULAR; INTRALESIONAL; INTRAMUSCULAR; INTRAVENOUS; SOFT TISSUE
Status: COMPLETED | OUTPATIENT
Start: 2024-01-05 | End: 2024-01-05

## 2024-01-05 RX ORDER — CEFTRIAXONE 1 G/1
1 INJECTION, POWDER, FOR SOLUTION INTRAMUSCULAR; INTRAVENOUS
Status: COMPLETED | OUTPATIENT
Start: 2024-01-05 | End: 2024-01-05

## 2024-01-05 RX ORDER — SEMAGLUTIDE 0.68 MG/ML
INJECTION, SOLUTION SUBCUTANEOUS
COMMUNITY
Start: 2023-07-14

## 2024-01-05 RX ORDER — AZITHROMYCIN 250 MG/1
TABLET, FILM COATED ORAL
Qty: 6 TABLET | Refills: 0 | Status: SHIPPED | OUTPATIENT
Start: 2024-01-05 | End: 2024-01-10

## 2024-01-05 RX ORDER — AZITHROMYCIN 250 MG/1
TABLET, FILM COATED ORAL
Qty: 6 TABLET | Refills: 0 | Status: SHIPPED | OUTPATIENT
Start: 2024-01-05 | End: 2024-01-05

## 2024-01-05 RX ADMIN — CEFTRIAXONE 1 G: 1 INJECTION, POWDER, FOR SOLUTION INTRAMUSCULAR; INTRAVENOUS at 01:01

## 2024-01-05 RX ADMIN — DEXAMETHASONE SODIUM PHOSPHATE 8 MG: 4 INJECTION, SOLUTION INTRA-ARTICULAR; INTRALESIONAL; INTRAMUSCULAR; INTRAVENOUS; SOFT TISSUE at 01:01

## 2024-01-05 NOTE — PROGRESS NOTES
"Subjective:       Kirk Villar is a 58 y.o. female who presents for evaluation of symptoms of a URI. Symptoms include congestion, cough described as nonproductive, no  fever, shortness of breath, and back pain . Onset of symptoms was 1 week ago, and has been gradually worsening since that time. Treatment to date:  albuterol nebs .    Review of Systems  Pertinent items are noted in HPI.     Objective:      /74   Pulse (!) 113   Resp 20   Ht 5' 5.5" (1.664 m)   Wt 93.9 kg (207 lb)   SpO2 95%   BMI 33.92 kg/m²   General appearance: alert, appears stated age, and cooperative  Head: Normocephalic, without obvious abnormality, atraumatic  Eyes: conjunctivae/corneas clear. PERRL, EOM's intact. Fundi benign.  Ears: abnormal TM right ear - dull and abnormal TM left ear - dull  Nose: Nares normal. Septum midline. Mucosa normal. No drainage or sinus tenderness., mild congestion  Throat: abnormal findings: mild oropharyngeal erythema and PND  Lungs: wheezes bilaterally and intermittent and tightness  Heart: regular rate and rhythm, S1, S2 normal, no murmur, click, rub or gallop  Extremities: extremities normal, atraumatic, no cyanosis or edema  Skin: Skin color, texture, turgor normal. No rashes or lesions  Lymph nodes: Cervical, supraclavicular, and axillary nodes normal.  Neurologic: Alert and oriented X 3, normal strength and tone. Normal symmetric reflexes. Normal coordination and gait     Assessment:      bronchitis and URI     Plan:      Discussed diagnosis and treatment of URI.  Suggested symptomatic OTC remedies.  Nasal saline spray for congestion.  Zithromax per orders.  Follow up as needed.  Monitor BG closely.  Go to ER with any significantly high readings, chest pain, worsening SOB. Voiced understanding.   "

## 2024-01-10 DIAGNOSIS — J40 BRONCHITIS: Primary | ICD-10-CM

## 2024-01-10 RX ORDER — METHYLPREDNISOLONE 4 MG/1
TABLET ORAL
Qty: 21 TABLET | Refills: 0 | Status: SHIPPED | OUTPATIENT
Start: 2024-01-10 | End: 2024-01-24

## 2024-01-24 ENCOUNTER — OFFICE VISIT (OUTPATIENT)
Dept: FAMILY MEDICINE | Facility: CLINIC | Age: 59
End: 2024-01-24
Payer: COMMERCIAL

## 2024-01-24 VITALS
SYSTOLIC BLOOD PRESSURE: 118 MMHG | RESPIRATION RATE: 16 BRPM | WEIGHT: 208.19 LBS | HEIGHT: 66 IN | OXYGEN SATURATION: 95 % | HEART RATE: 77 BPM | DIASTOLIC BLOOD PRESSURE: 60 MMHG | BODY MASS INDEX: 33.46 KG/M2 | TEMPERATURE: 98 F

## 2024-01-24 DIAGNOSIS — R06.02 SOB (SHORTNESS OF BREATH): ICD-10-CM

## 2024-01-24 DIAGNOSIS — R06.2 WHEEZING: Primary | ICD-10-CM

## 2024-01-24 PROCEDURE — 3008F BODY MASS INDEX DOCD: CPT | Mod: CPTII,,, | Performed by: NURSE PRACTITIONER

## 2024-01-24 PROCEDURE — 3078F DIAST BP <80 MM HG: CPT | Mod: CPTII,,, | Performed by: NURSE PRACTITIONER

## 2024-01-24 PROCEDURE — 99213 OFFICE O/P EST LOW 20 MIN: CPT | Mod: ,,, | Performed by: NURSE PRACTITIONER

## 2024-01-24 PROCEDURE — 1160F RVW MEDS BY RX/DR IN RCRD: CPT | Mod: CPTII,,, | Performed by: NURSE PRACTITIONER

## 2024-01-24 PROCEDURE — 3074F SYST BP LT 130 MM HG: CPT | Mod: CPTII,,, | Performed by: NURSE PRACTITIONER

## 2024-01-24 PROCEDURE — 1159F MED LIST DOCD IN RCRD: CPT | Mod: CPTII,,, | Performed by: NURSE PRACTITIONER

## 2024-01-24 RX ORDER — CYCLOBENZAPRINE HCL 10 MG
10 TABLET ORAL 2 TIMES DAILY PRN
COMMUNITY
Start: 2024-01-12 | End: 2024-03-15 | Stop reason: SDUPTHER

## 2024-01-26 RX ORDER — METHYLPREDNISOLONE 4 MG/1
TABLET ORAL
Qty: 21 TABLET | Refills: 0 | Status: SHIPPED | OUTPATIENT
Start: 2024-01-26

## 2024-01-29 NOTE — PROGRESS NOTES
Anabell Mendez NP   [address] [place] [address]     PATIENT NAME: Kirk Villar  : 1965  DATE: 24  MRN: [id number]      Billing Provider: Anabell Mendez NP  Level of Service:   Patient PCP Information       Provider PCP Type    Primary Doctor No General            Reason for Visit / Chief Complaint: Wheezing (Still c/o SOB/wheezing and needs a paper filled out.)       Update PCP  Update Chief Complaint         History of Present Illness / Problem Focused Workflow     Kirk Villar presents to the clinic with Wheezing (Still c/o SOB/wheezing and needs a paper filled out.)     Wheezing   Associated symptoms include shortness of breath. Pertinent negatives include no abdominal pain, chest pain, chills, coughing, diarrhea, fever, headaches, rhinorrhea, sore throat or vomiting.     Patient reports continued wheezing and SOB though significantly improved after previous steroid prescription. She is continuing nebulizer tx at home. She denies fever. She does admit to lower ext. Edema. She is a previous patient at [facility] but has not been lately due to outstanding balance.     Patient also needs handicap parking paperwork filled out.     Review of Systems     Review of Systems   Constitutional:  Negative for activity change, appetite change, chills, fatigue and fever.   HENT:  Negative for nasal congestion, ear discharge, nosebleeds, postnasal drip, rhinorrhea, sinus pressure/congestion, sneezing, sore throat and tinnitus.    Eyes:  Negative for pain, discharge, redness and itching.   Respiratory:  Positive for shortness of breath and wheezing. Negative for cough, choking and chest tightness.    Cardiovascular:  Negative for chest pain.   Gastrointestinal:  Negative for abdominal distention, abdominal pain, blood in stool, change in bowel habit, constipation, diarrhea, nausea and vomiting.   Genitourinary:  Negative for decreased urine volume, dysuria, flank pain, frequency, menstrual irregularity  and menstrual problem.   Musculoskeletal:  Negative for back pain and gait problem.   Integumentary:  Negative for wound, breast mass and breast discharge.   Allergic/Immunologic: Negative for immunocompromised state.   Neurological:  Negative for dizziness, light-headedness and headaches.   Psychiatric/Behavioral:  Negative for agitation, behavioral problems and hallucinations.    Breast: Negative for mass       Medical / Social / Family History     Past Medical History:   Diagnosis Date    Depression     Diabetes mellitus, type 2        History reviewed. No pertinent surgical history.    Social History  Ms.  reports that she has been smoking cigarettes. She has never used smokeless tobacco. She reports current alcohol use. She reports that she does not use drugs.    Family History  Ms.'s family history is not on file.    Medications and Allergies     Medications  Outpatient Medications Marked as Taking for the 1/24/24 encounter (Office Visit) with Anabell Mendez NP   Medication Sig Dispense Refill    blood sugar diagnostic (ACCU-CHEK GUIDE TEST STRIPS) Strp 1 strip by Misc.(Non-Drug; Combo Route) route 3 (three) times daily. 100 strip 11    citalopram (CELEXA) 40 MG tablet Take 1 tablet (40 mg total) by mouth once daily. 90 tablet 2    clopidogreL (PLAVIX) 75 mg tablet Take 1 tablet (75 mg total) by mouth once daily. 90 tablet 2    cyclobenzaprine (FLEXERIL) 10 MG tablet Take 10 mg by mouth 2 (two) times daily as needed for Muscle spasms.      ezetimibe (ZETIA) 10 mg tablet Take 1 tablet (10 mg total) by mouth once daily. 90 tablet 2    furosemide (LASIX) 40 MG tablet Take 1 tablet (40 mg total) by mouth 4 (four) times daily. 120 tablet 2    gabapentin (NEURONTIN) 800 MG tablet Take 1 tablet (800 mg total) by mouth 4 (four) times daily. 120 tablet 2    insulin aspart U-100 (NOVOLOG FLEXPEN U-100 INSULIN) 100 unit/mL (3 mL) InPn pen Inject 5-10 minutes before meals per sliding scale 2 each 2    insulin  "degludec (TRESIBA FLEXTOUCH U-100) 100 unit/mL (3 mL) insulin pen Inject 20 Units into the skin every evening. 6 mL 11    lisinopriL (PRINIVIL,ZESTRIL) 5 MG tablet Take 1 tablet (5 mg total) by mouth once daily. 90 tablet 2    metoprolol succinate (TOPROL-XL) 25 MG 24 hr tablet Take 1 tablet (25 mg total) by mouth 2 (two) times daily. 180 tablet 2    ondansetron (ZOFRAN-ODT) 4 MG TbDL Take 2 tablets (8 mg total) by mouth every 6 (six) hours as needed (nausea). 30 tablet 0    OZEMPIC 0.25 mg or 0.5 mg (2 mg/3 mL) pen injector Inject into the skin.      rivaroxaban (XARELTO) 20 mg Tab Take 1 tablet (20 mg total) by mouth daily with dinner or evening meal. 90 tablet 2    semaglutide (OZEMPIC) 0.25 mg or 0.5 mg(2 mg/1.5 mL) pen injector Inject 0.5 mg into the skin every 7 days. 2 each 2    traZODone (DESYREL) 100 MG tablet Take 1 tablet (100 mg total) by mouth every evening. 90 tablet 2       Allergies  Review of patient's allergies indicates:  No Known Allergies    Physical Examination   /60 (BP Location: Left arm, Patient Position: Sitting, BP Method: Large (Manual))   Pulse 77   Temp 98.3 °F (36.8 °C) (Oral)   Resp 16   Ht 5' 5.5" (1.664 m)   Wt 94.4 kg (208 lb 3.2 oz)   SpO2 95%   BMI 34.12 kg/m²    Physical Exam  Vitals and nursing note reviewed.   Constitutional:       Appearance: Normal appearance. She is normal weight.   HENT:      Head: Normocephalic.      Nose: Nose normal.      Mouth/Throat:      Mouth: Mucous membranes are moist.      Pharynx: Oropharynx is clear.   Eyes:      Extraocular Movements: Extraocular movements intact.      Conjunctiva/sclera: Conjunctivae normal.      Pupils: Pupils are equal, round, and reactive to light.   Cardiovascular:      Rate and Rhythm: Normal rate and regular rhythm.      Heart sounds: Normal heart sounds.   Pulmonary:      Breath sounds: Wheezing present.   Abdominal:      General: Bowel sounds are normal.   Musculoskeletal:         General: Normal " range of motion.      Cervical back: Normal range of motion and neck supple.      Right lower leg: Edema present.      Left lower leg: Edema present.   Skin:     General: Skin is warm and dry.   Neurological:      General: No focal deficit present.      Mental Status: She is alert and oriented to person, place, and time.   Psychiatric:         Mood and Affect: Mood normal.        Assessment and Plan (including Health Maintenance)      Problem List  Smart Sets  Document Outside HM   :    Plan:   Referral to pulmonology in progress.   Recommend cardiology follow up.  RTC as needed or if s/s worsen or persist.      Health Maintenance Due   Topic Date Due    Hepatitis C Screening  Never done    Cervical Cancer Screening  Never done    COVID-19 Vaccine (1) Never done    Pneumococcal Vaccines (Age 0-64) (1 of 2 - PCV) Never done    HIV Screening  Never done    TETANUS VACCINE  Never done    Mammogram  Never done    Colorectal Cancer Screening  Never done    Shingles Vaccine (1 of 2) Never done    Influenza Vaccine (1) Never done    RSV Vaccine (Age 60+ and Pregnant patients) (1 - 1-dose 60+ series) Never done       Problem List Items Addressed This Visit    None  Visit Diagnoses       Wheezing    -  Primary    Relevant Orders    Ambulatory referral/consult to Pulmonology    SOB (shortness of breath)        Relevant Orders    Ambulatory referral/consult to Pulmonology            Health Maintenance Topics with due status: Not Due       Topic Last Completion Date    Hemoglobin A1c (Diabetic Prevention Screening) 11/30/2022    Lipid Panel 11/30/2022       No future appointments.         Signature:  Anabell Mendez NP      [id number]   [place] [id number]    Date of encounter: 1/24/24

## 2024-02-09 ENCOUNTER — TELEPHONE (OUTPATIENT)
Dept: PULMONOLOGY | Facility: CLINIC | Age: 59
End: 2024-02-09
Payer: MEDICARE

## 2024-02-09 ENCOUNTER — OFFICE VISIT (OUTPATIENT)
Dept: PULMONOLOGY | Facility: CLINIC | Age: 59
End: 2024-02-09
Payer: MEDICARE

## 2024-02-09 ENCOUNTER — HOSPITAL ENCOUNTER (OUTPATIENT)
Dept: RADIOLOGY | Facility: HOSPITAL | Age: 59
Discharge: HOME | End: 2024-02-09
Attending: STUDENT IN AN ORGANIZED HEALTH CARE EDUCATION/TRAINING PROGRAM
Payer: MEDICARE

## 2024-02-09 VITALS
SYSTOLIC BLOOD PRESSURE: 124 MMHG | OXYGEN SATURATION: 95 % | RESPIRATION RATE: 16 BRPM | HEIGHT: 60 IN | BODY MASS INDEX: 40.01 KG/M2 | DIASTOLIC BLOOD PRESSURE: 68 MMHG | WEIGHT: 203.81 LBS | HEART RATE: 78 BPM

## 2024-02-09 DIAGNOSIS — R06.02 SOB (SHORTNESS OF BREATH): ICD-10-CM

## 2024-02-09 DIAGNOSIS — R06.2 WHEEZING: Primary | ICD-10-CM

## 2024-02-09 DIAGNOSIS — R06.2 WHEEZING: ICD-10-CM

## 2024-02-09 PROCEDURE — 99205 OFFICE O/P NEW HI 60 MIN: CPT | Mod: S$PBB,25,, | Performed by: STUDENT IN AN ORGANIZED HEALTH CARE EDUCATION/TRAINING PROGRAM

## 2024-02-09 PROCEDURE — 4010F ACE/ARB THERAPY RXD/TAKEN: CPT | Mod: CPTII,,, | Performed by: STUDENT IN AN ORGANIZED HEALTH CARE EDUCATION/TRAINING PROGRAM

## 2024-02-09 PROCEDURE — 3078F DIAST BP <80 MM HG: CPT | Mod: CPTII,,, | Performed by: STUDENT IN AN ORGANIZED HEALTH CARE EDUCATION/TRAINING PROGRAM

## 2024-02-09 PROCEDURE — 99407 BEHAV CHNG SMOKING > 10 MIN: CPT | Mod: S$PBB,,, | Performed by: STUDENT IN AN ORGANIZED HEALTH CARE EDUCATION/TRAINING PROGRAM

## 2024-02-09 PROCEDURE — 3008F BODY MASS INDEX DOCD: CPT | Mod: CPTII,,, | Performed by: STUDENT IN AN ORGANIZED HEALTH CARE EDUCATION/TRAINING PROGRAM

## 2024-02-09 PROCEDURE — G0296 VISIT TO DETERM LDCT ELIG: HCPCS | Mod: ,,, | Performed by: STUDENT IN AN ORGANIZED HEALTH CARE EDUCATION/TRAINING PROGRAM

## 2024-02-09 PROCEDURE — 1159F MED LIST DOCD IN RCRD: CPT | Mod: CPTII,,, | Performed by: STUDENT IN AN ORGANIZED HEALTH CARE EDUCATION/TRAINING PROGRAM

## 2024-02-09 PROCEDURE — 99215 OFFICE O/P EST HI 40 MIN: CPT | Mod: PBBFAC,25 | Performed by: STUDENT IN AN ORGANIZED HEALTH CARE EDUCATION/TRAINING PROGRAM

## 2024-02-09 PROCEDURE — 71046 X-RAY EXAM CHEST 2 VIEWS: CPT | Mod: 26,,, | Performed by: RADIOLOGY

## 2024-02-09 PROCEDURE — 71046 X-RAY EXAM CHEST 2 VIEWS: CPT | Mod: TC

## 2024-02-09 PROCEDURE — 3074F SYST BP LT 130 MM HG: CPT | Mod: CPTII,,, | Performed by: STUDENT IN AN ORGANIZED HEALTH CARE EDUCATION/TRAINING PROGRAM

## 2024-02-09 NOTE — TELEPHONE ENCOUNTER
I called patient to make sure she will still be able to come back and get her Xray and labs done today. She stated she would be back no later than 4pm.

## 2024-02-12 ENCOUNTER — TELEPHONE (OUTPATIENT)
Dept: PULMONOLOGY | Facility: CLINIC | Age: 59
End: 2024-02-12
Payer: MEDICARE

## 2024-02-12 DIAGNOSIS — I50.31 ACUTE DIASTOLIC CHF (CONGESTIVE HEART FAILURE): Primary | ICD-10-CM

## 2024-02-12 NOTE — TELEPHONE ENCOUNTER
Spoke to patient stated she is doing ok. No SOB noted at this time. Echo scheduled for 2/19/24 @7am but patient stated she is going out of town will not return until 2/23/24 requesting to change appt to anything after 2/26/24. Echo rescheduled for 2/26/24 at 3pm.

## 2024-02-12 NOTE — TELEPHONE ENCOUNTER
----- Message from Gilbert Mcdaniels MD sent at 2/12/2024  7:59 AM CST -----  Good morning,     Please contact to tell her her CXR has only mild edema (no gross effusions). Please check on her cardiology referral (need urgent referral this week with echo prior). If more SOB than Friday, then will need to come in for IV diuretics. Keep me posted.    AK

## 2024-02-12 NOTE — PROGRESS NOTES
Echo ordered, BNP elevated. Patient on Lasix 160mg at home. Scheduling urgent cardiology referral and will coordinate with patient so she can inform us if her clinical status worsens (incase she requires IV diuretics in patient)> Some intersitial edema on CXR, no pleural effusions.

## 2024-02-12 NOTE — TELEPHONE ENCOUNTER
Call fwd to . Patient scheduled for Echo on 2/19/24 @7am. Awaiting  to close chart to send cardiac RF.

## 2024-02-15 NOTE — PROGRESS NOTES
Patient Name: Kirk Villar   Primary Care Provider: [facility] Primary [facility]   Date of Service: 02/09/2024   Reason for Referral:  Shortness of breath    Chief Complaint: Shortness of Breath (With and without exertion), Wheezing, and Cough      Subjective:      Kirk Villar is 58 y.o. female with Past medical history significant for coronary artery disease with CHF (tells me it is reduced but unknown ejection fraction), lost to follow up with outside cardiology on Plavix, 160 mg of Lasix who presents to the Pulmonary Clinic for evaluation of shortness of breath.      Initial clinic visit 2/9/24  She presents today stating that she has been experiencing increasing shortness of breath gradually over the last two weeks associated with worsening orthopnea and lower extremity edema.  She states that her edema is worse at night and she continues to take 160 mg daily of Lasix.  She was followed by Cardiology at an outside hospital but has been lost to follow up over the last six months.  Her shortness of breath has been gradually increasing for the last three weeks.  She denies any prolonged immobility or unilateral leg swelling.  She is saturating 95% today on room air and her heart rate is at 78.  She continues to smoke one pack per day (has a 20-30 pack-year history of smoking).       Personal Diagnostics Review  I personally reviewed and interpreted the following     Labs from November 2022 which showed no evidence of leukocytosis, electrolyte within normal limits and renal function at 1.03.  She also had an elevated hemoglobin A1c suggestive of diabetes.  An x-ray last performed on 1/2/24 revealed hyperinflated lungs with prominent interstitial opacities.      Assessment and Plan      CHF exacerbation      Assessment:  Patient has a history of heart failure with reduced ejection fraction (unknown ejection fraction as these are outside records).  Based on her history, her orthopnea she appears to have an exacerbation  of CHF.  She is still saturating well on room air at 95% and has underwent 60 mg of Lasix at home.  She does require a Cardiology evaluation and a repeat echocardiogram.  I have sent her for labs and a chest x-ray today with instructions to come to the emergency department/call our office if her dyspnea increases for any reason, which may necessitate her receiving IV diuretics.  Stable for now and recommended to continue her diuretics.  No chest pain with exertion or at rest.  No tachycardia at rest.    Plan  Continue diuretics for probable CHF  Labs today, including proBNP  Chest x-ray today  Cardiology evaluation (urgent referral) and repeat cardiogram  We will attempt to obtain records from outside hospital         Multifactorial dyspnea   Probable obstructive lung disease      Assessment:  Based on the patient's history, and prior imaging she may have a diagnosis of COPD.  Her dyspnea is certainly multifactorial with some interstitial edema from her underlying CHF contributing.  When she is better volume optimized, we will obtain pulmonary function testing to further characterize her underlying pulmonary disease.    Plan  Complete PFTs with pre and post bronchodilator testing and a 6 minute walk test before the patient comes and sees me in four weeks' time      Tobacco cessation      Assessment:  Patient currently smokes half pack per day.  We spent a significant amount of time talking about tobacco cessation, the factors that keep her smoking such as habit and cravings.  We also discussed in details ways to stop smoking after she expressed a strong desire to quit and she has not currently interested in nicotine replacement therapy.  She tells me that she will attempt to quit cold turkey before she comes and sees me in four weeks' time.  At that time if she has not made significant progress then she is interested in nicotine replacement therapy..  Duration of counseling approximately 11  minutes.    Plan  Nicotine replacement therapy deferred, see above.  We will continue to monitor progress on next visit         Lung cancer screening      Assessment:  The patient meets criteria for lung cancer screening (has a greater than 20 pack-year history of smoking, is 58 years old and has not quit more than 15 years ago).  We had a discussion regarding what lung cancer screening entails and after shared decision-making, she is agreed to low-dose CT scan which she would like to get done in the near future.    Plan  Low-dose CT scan for lung cancer screening to be discuss further and ordered next visit, once her acute CHF exacerbation if resolved (will help with identifying any underlying lung nodules or masses better if she has no or less interstitial edema)        Follow-up   Follow-up in four weeks' time at which time we will review her labs, pulmonary function testing, recommendations from Cardiology, echocardiogram and further discussion on tobacco cessation and low-dose lung cancer screening    Gilbert Mcdaniels MD  Interventional Pulmonary and Critical Care  [facility]      Problem List Items Addressed This Visit    None  Visit Diagnoses       Wheezing    -  Primary    Relevant Orders    Complete PFT w/ bronchodilator    Stress test, pulmonary    X-Ray Chest PA And Lateral (Completed)    Basic Metabolic Panel (Completed)    CBC Auto Differential (Completed)    NT-Pro Natriuretic Peptide (Completed)    Ambulatory referral/consult to Cardiology    SOB (shortness of breath)                    Past Medical History:   Diagnosis Date    Depression     Diabetes mellitus, type 2       History reviewed. No pertinent surgical history.  Family History   Problem Relation Age of Onset    Cancer Mother     Heart failure Father      Review of patient's allergies indicates:  No Known Allergies   Social History     Tobacco Use    Smoking status: Every Day     Current packs/day: 0.50     Types: Cigarettes     "Smokeless tobacco: Never   Substance Use Topics    Alcohol use: Yes     Comment: occasionally    Drug use: Never      Review of Systems       Objective:      Physical Exam  Constitutional:       General: She is not in acute distress.     Appearance: Normal appearance. She is normal weight. She is not ill-appearing or diaphoretic.   HENT:      Head: Normocephalic and atraumatic.      Nose: No congestion or rhinorrhea.      Mouth/Throat:      Mouth: Mucous membranes are moist.   Cardiovascular:      Rate and Rhythm: Normal rate and regular rhythm.      Pulses: Normal pulses.      Heart sounds: Normal heart sounds.   Pulmonary:      Effort: Pulmonary effort is normal. No respiratory distress.      Breath sounds: No stridor. Rales present. No rhonchi.   Chest:      Chest wall: No tenderness.   Abdominal:      General: Abdomen is flat.   Musculoskeletal:      Cervical back: Normal range of motion. No rigidity.      Right lower leg: Edema present.      Left lower leg: Edema present.   Skin:     General: Skin is warm.      Findings: No erythema.   Neurological:      General: No focal deficit present.      Mental Status: She is alert and oriented to person, place, and time. Mental status is at baseline.   Psychiatric:         Mood and Affect: Mood normal.         Behavior: Behavior normal.         Thought Content: Thought content normal.                2/9/2024    11:31 AM 1/24/2024     1:12 PM 1/5/2024     1:08 PM 11/7/2023     9:26 AM 11/2/2023     1:34 PM 10/13/2023    10:11 AM   Pulmonary Function Tests   SpO2 95 % 95 % 95 % 97 % 98 % 98 %   Height 5' (1.524 m) 5' 5.5" (1.664 m) 5' 5.5" (1.664 m) 5' 6" (1.676 m) 5' 5.5" (1.664 m) 5' 5.5" (1.664 m)   Weight 92.4 kg (203 lb 12.8 oz) 94.4 kg (208 lb 3.2 oz) 93.9 kg (207 lb) 90.7 kg (200 lb) 92.1 kg (203 lb) 93.9 kg (207 lb)   BMI (Calculated) 39.8 34.1 33.9 32.3 33.3 33.9         Outpatient Encounter Medications as of 2/9/2024   Medication Sig Dispense Refill    blood " sugar diagnostic (ACCU-CHEK GUIDE TEST STRIPS) Strp 1 strip by Misc.(Non-Drug; Combo Route) route 3 (three) times daily. 100 strip 11    citalopram (CELEXA) 40 MG tablet Take 1 tablet (40 mg total) by mouth once daily. 90 tablet 2    clopidogreL (PLAVIX) 75 mg tablet Take 1 tablet (75 mg total) by mouth once daily. 90 tablet 2    cyclobenzaprine (FLEXERIL) 10 MG tablet Take 10 mg by mouth 2 (two) times daily as needed for Muscle spasms.      ezetimibe (ZETIA) 10 mg tablet Take 1 tablet (10 mg total) by mouth once daily. 90 tablet 2    furosemide (LASIX) 40 MG tablet Take 1 tablet (40 mg total) by mouth 4 (four) times daily. 120 tablet 2    gabapentin (NEURONTIN) 800 MG tablet Take 1 tablet (800 mg total) by mouth 4 (four) times daily. 120 tablet 2    insulin aspart U-100 (NOVOLOG FLEXPEN U-100 INSULIN) 100 unit/mL (3 mL) InPn pen Inject 5-10 minutes before meals per sliding scale 2 each 2    lisinopriL (PRINIVIL,ZESTRIL) 5 MG tablet Take 1 tablet (5 mg total) by mouth once daily. 90 tablet 2    methylPREDNISolone (MEDROL DOSEPACK) 4 mg tablet use as directed 21 tablet 0    metoprolol succinate (TOPROL-XL) 25 MG 24 hr tablet Take 1 tablet (25 mg total) by mouth 2 (two) times daily. 180 tablet 2    rivaroxaban (XARELTO) 20 mg Tab Take 1 tablet (20 mg total) by mouth daily with dinner or evening meal. 90 tablet 2    traZODone (DESYREL) 100 MG tablet Take 1 tablet (100 mg total) by mouth every evening. 90 tablet 2    ALPRAZolam (XANAX) 0.25 MG tablet Take 1 tablet (0.25 mg total) by mouth 2 (two) times daily as needed for Anxiety. (Patient not taking: Reported on 2/9/2024) 60 tablet 0    insulin degludec (TRESIBA FLEXTOUCH U-100) 100 unit/mL (3 mL) insulin pen Inject 20 Units into the skin every evening. (Patient not taking: Reported on 2/9/2024) 6 mL 11    ondansetron (ZOFRAN-ODT) 4 MG TbDL Take 2 tablets (8 mg total) by mouth every 6 (six) hours as needed (nausea). (Patient not taking: Reported on 2/9/2024) 30 tablet  0    OZEMPIC 0.25 mg or 0.5 mg (2 mg/3 mL) pen injector Inject into the skin.      semaglutide (OZEMPIC) 0.25 mg or 0.5 mg(2 mg/1.5 mL) pen injector Inject 0.5 mg into the skin every 7 days. (Patient not taking: Reported on 2/9/2024) 2 each 2     No facility-administered encounter medications on file as of 2/9/2024.       Assessment & Plan    As above                                          Orders Placed This Encounter   Procedures    X-Ray Chest PA And Lateral     Standing Status:   Future     Number of Occurrences:   1     Standing Expiration Date:   2/9/2025     Order Specific Question:   May the Radiologist modify the order per protocol to meet the clinical needs of the patient?     Answer:   Yes     Order Specific Question:   Release to patient     Answer:   Immediate    Basic Metabolic Panel     Standing Status:   Future     Number of Occurrences:   1     Standing Expiration Date:   4/9/2025    CBC Auto Differential     Standing Status:   Future     Number of Occurrences:   1     Standing Expiration Date:   4/9/2025    NT-Pro Natriuretic Peptide     Standing Status:   Future     Number of Occurrences:   1     Standing Expiration Date:   4/9/2025    Ambulatory referral/consult to Cardiology     Standing Status:   Future     Standing Expiration Date:   3/9/2025     Referral Priority:   Routine     Referral Type:   Consultation     Referral Reason:   Specialty Services Required     Requested Specialty:   Cardiology     Number of Visits Requested:   1    Complete PFT w/ bronchodilator     Standing Status:   Future     Standing Expiration Date:   2/9/2025     Order Specific Question:   Release to patient     Answer:   Immediate    Stress test, pulmonary     Standing Status:   Future     Standing Expiration Date:   2/9/2025     Order Specific Question:   Reason for study     Answer:   Functional status     Order Specific Question:   Release to patient     Answer:   Immediate

## 2024-02-21 ENCOUNTER — TELEPHONE (OUTPATIENT)
Dept: PULMONOLOGY | Facility: CLINIC | Age: 59
End: 2024-02-21
Payer: MEDICARE

## 2024-02-21 NOTE — TELEPHONE ENCOUNTER
Patient left a  to return call regarding Cardio RF. Appt not made just yet gave patient a number to call to check on [phone]. Patient voiced no rush she is still in [place] with kids. Will return sometime next week. No other questions or concerns at this time.

## 2024-02-25 DIAGNOSIS — I50.20 SYSTOLIC CONGESTIVE HEART FAILURE, UNSPECIFIED HF CHRONICITY: Primary | ICD-10-CM

## 2024-02-26 ENCOUNTER — TELEPHONE (OUTPATIENT)
Dept: PULMONOLOGY | Facility: CLINIC | Age: 59
End: 2024-02-26
Payer: MEDICARE

## 2024-02-26 ENCOUNTER — HOSPITAL ENCOUNTER (OUTPATIENT)
Dept: CARDIOLOGY | Facility: HOSPITAL | Age: 59
Discharge: HOME | End: 2024-02-26
Attending: STUDENT IN AN ORGANIZED HEALTH CARE EDUCATION/TRAINING PROGRAM
Payer: MEDICARE

## 2024-02-26 VITALS — WEIGHT: 203 LBS | HEIGHT: 60 IN | BODY MASS INDEX: 39.85 KG/M2

## 2024-02-26 DIAGNOSIS — I50.31 ACUTE DIASTOLIC CHF (CONGESTIVE HEART FAILURE): ICD-10-CM

## 2024-02-26 PROCEDURE — 93306 TTE W/DOPPLER COMPLETE: CPT | Mod: 26,,, | Performed by: STUDENT IN AN ORGANIZED HEALTH CARE EDUCATION/TRAINING PROGRAM

## 2024-02-26 PROCEDURE — 93306 TTE W/DOPPLER COMPLETE: CPT

## 2024-02-26 NOTE — TELEPHONE ENCOUNTER
----- Message from Gilbert Mcdaniels MD sent at 2/25/2024  9:39 PM CST -----  Please have her come and have a repeat basic metabolic panel drawn.  She is on diuretics that have been prescribed to her and we want to check on her potassium and renal function.

## 2024-02-28 LAB
AORTIC ROOT ANNULUS: 3.18 CM
AORTIC VALVE CUSP SEPERATION: 2.26 CM
AV INDEX (PROSTH): 0.89
AV MEAN GRADIENT: 4 MMHG
AV PEAK GRADIENT: 8 MMHG
AV VALVE AREA BY VELOCITY RATIO: 2.67 CM²
AV VALVE AREA: 2.82 CM²
AV VELOCITY RATIO: 0.84
BSA FOR ECHO PROCEDURE: 1.97 M2
CV ECHO LV RWT: 0.53 CM
DOP CALC AO PEAK VEL: 1.4 M/S
DOP CALC AO VTI: 27.1 CM
DOP CALC LVOT AREA: 3.2 CM2
DOP CALC LVOT DIAMETER: 2.01 CM
DOP CALC LVOT PEAK VEL: 1.18 M/S
DOP CALC LVOT STROKE VOLUME: 76.43 CM3
DOP CALCLVOT PEAK VEL VTI: 24.1 CM
E WAVE DECELERATION TIME: 171.84 MSEC
E/A RATIO: 1.16
E/E' RATIO: 9.2 M/S
ECHO LV POSTERIOR WALL: 1.21 CM (ref 0.6–1.1)
FRACTIONAL SHORTENING: 34 % (ref 28–44)
INTERVENTRICULAR SEPTUM: 1.21 CM (ref 0.6–1.1)
IVC DIAMETER: 1.65 CM
LEFT ATRIUM VOLUME INDEX MOD: 26.8 ML/M2
LEFT ATRIUM VOLUME MOD: 50.33 CM3
LEFT INTERNAL DIMENSION IN SYSTOLE: 3 CM (ref 2.1–4)
LEFT VENTRICLE DIASTOLIC VOLUME INDEX: 51.06 ML/M2
LEFT VENTRICLE DIASTOLIC VOLUME: 95.99 ML
LEFT VENTRICLE MASS INDEX: 109 G/M2
LEFT VENTRICLE SYSTOLIC VOLUME INDEX: 18.6 ML/M2
LEFT VENTRICLE SYSTOLIC VOLUME: 35 ML
LEFT VENTRICULAR INTERNAL DIMENSION IN DIASTOLE: 4.57 CM (ref 3.5–6)
LEFT VENTRICULAR MASS: 205.35 G
LV LATERAL E/E' RATIO: 8.36 M/S
LV SEPTAL E/E' RATIO: 10.22 M/S
LVOT MG: 2.68 MMHG
LVOT MV: 0.8 CM/S
MV PEAK A VEL: 0.79 M/S
MV PEAK E VEL: 0.92 M/S
OHS LV EJECTION FRACTION SIMPSONS BIPLANE MOD: 43 %
PV PEAK GRADIENT: 6 MMHG
PV PEAK VELOCITY: 1.25 M/S
RA PRESSURE ESTIMATED: 3 MMHG
RA VOL SYS: 31.44 ML
RIGHT ATRIAL AREA: 12.9 CM2
RIGHT VENTRICLE DIASTOLIC LENGTH: 6.8 CM
RIGHT VENTRICLE DIASTOLIC MID DIMENSION: 2.6 CM
RIGHT VENTRICULAR END-DIASTOLIC DIMENSION: 2.8 CM
RIGHT VENTRICULAR LENGTH IN DIASTOLE (APICAL 4-CHAMBER VIEW): 6.8 CM
RV MID DIAMA: 2.56 CM
TDI LATERAL: 0.11 M/S
TDI SEPTAL: 0.09 M/S
TDI: 0.1 M/S
TRICUSPID ANNULAR PLANE SYSTOLIC EXCURSION: 2.59 CM
Z-SCORE OF LEFT VENTRICULAR DIMENSION IN END DIASTOLE: -1.39
Z-SCORE OF LEFT VENTRICULAR DIMENSION IN END SYSTOLE: -0.6

## 2024-02-28 RX ORDER — FUROSEMIDE 40 MG/1
TABLET ORAL
Qty: 360 TABLET | Refills: 3 | Status: SHIPPED | OUTPATIENT
Start: 2024-02-28 | End: 2024-03-15 | Stop reason: SDUPTHER

## 2024-02-29 ENCOUNTER — TELEPHONE (OUTPATIENT)
Dept: PULMONOLOGY | Facility: CLINIC | Age: 59
End: 2024-02-29
Payer: MEDICARE

## 2024-02-29 RX ORDER — ALBUTEROL SULFATE 90 UG/1
2 AEROSOL, METERED RESPIRATORY (INHALATION) EVERY 6 HOURS PRN
Qty: 18 G | Refills: 2 | Status: SHIPPED | OUTPATIENT
Start: 2024-02-29 | End: 2024-03-15 | Stop reason: SDUPTHER

## 2024-02-29 NOTE — TELEPHONE ENCOUNTER
I called Ms. Villar and and she stated that she will be willing to see a cardiologist next month no later than the 15th, IF POSSIBLE. She does not have a preference. I also informed her that I will be rescheduling her PFT and Stress test on the same day as her cardiology appointment.

## 2024-02-29 NOTE — TELEPHONE ENCOUNTER
----- Message from Gilbert Mcdaniels MD sent at 2/29/2024  7:32 AM CST -----  Please call the patient let her know that her systolic function is 40-45%.  She is aware of her prior cardiac history.  Please also obtain records from her cardiology appointments (she was recently lost to follow up, saw them a little over six months   ago), if we have not already.    Please also see when her cardiology appointment is (she was referred to Cardiology here internally).      Finally, please have her come in to have her labs done (see previous messages)

## 2024-02-29 NOTE — PROGRESS NOTES
Please call the patient let her know that her systolic function is 40-45%.  She is aware of her prior cardiac history.  Please also obtain records from her cardiology appointments (she was recently lost to follow up, saw them a little over six months ago), if we have not already.    Please also see when her cardiology appointment is (she was referred to Cardiology here internally).      Finally, please have her come in to have her labs done (see previous messages)

## 2024-02-29 NOTE — TELEPHONE ENCOUNTER
MsRereAurea's cardiology appointment has been scheduled. I informed her already and she is also aware of her labs.

## 2024-03-12 ENCOUNTER — OFFICE VISIT (OUTPATIENT)
Dept: CARDIOLOGY | Facility: CLINIC | Age: 59
End: 2024-03-12
Payer: MEDICARE

## 2024-03-12 VITALS
WEIGHT: 208 LBS | SYSTOLIC BLOOD PRESSURE: 130 MMHG | OXYGEN SATURATION: 95 % | BODY MASS INDEX: 33.43 KG/M2 | DIASTOLIC BLOOD PRESSURE: 80 MMHG | HEIGHT: 66 IN | HEART RATE: 91 BPM

## 2024-03-12 DIAGNOSIS — I50.31 ACUTE DIASTOLIC CHF (CONGESTIVE HEART FAILURE): Primary | ICD-10-CM

## 2024-03-12 DIAGNOSIS — I50.9 ACC/AHA STAGE C CONGESTIVE HEART FAILURE DUE TO ISCHEMIC CARDIOMYOPATHY: ICD-10-CM

## 2024-03-12 DIAGNOSIS — E11.65 UNCONTROLLED DIABETES MELLITUS WITH HYPERGLYCEMIA, WITHOUT LONG-TERM CURRENT USE OF INSULIN: ICD-10-CM

## 2024-03-12 DIAGNOSIS — I50.22 HEART FAILURE WITH MID-RANGE EJECTION FRACTION (HFMEF): ICD-10-CM

## 2024-03-12 DIAGNOSIS — R06.2 WHEEZING: ICD-10-CM

## 2024-03-12 DIAGNOSIS — I15.2 HYPERTENSION ASSOCIATED WITH DIABETES: ICD-10-CM

## 2024-03-12 DIAGNOSIS — Z78.9 ACE INHIBITOR INTOLERANCE: ICD-10-CM

## 2024-03-12 DIAGNOSIS — I25.5 ACC/AHA STAGE C CONGESTIVE HEART FAILURE DUE TO ISCHEMIC CARDIOMYOPATHY: ICD-10-CM

## 2024-03-12 DIAGNOSIS — E78.49 FAMILIAL HYPERLIPIDEMIA: ICD-10-CM

## 2024-03-12 DIAGNOSIS — E11.59 HYPERTENSION ASSOCIATED WITH DIABETES: ICD-10-CM

## 2024-03-12 PROCEDURE — 3075F SYST BP GE 130 - 139MM HG: CPT | Mod: CPTII,,, | Performed by: HOSPITALIST

## 2024-03-12 PROCEDURE — 93010 ELECTROCARDIOGRAM REPORT: CPT | Mod: S$PBB,,, | Performed by: HOSPITALIST

## 2024-03-12 PROCEDURE — 99215 OFFICE O/P EST HI 40 MIN: CPT | Mod: PBBFAC,25 | Performed by: HOSPITALIST

## 2024-03-12 PROCEDURE — 4010F ACE/ARB THERAPY RXD/TAKEN: CPT | Mod: CPTII,,, | Performed by: HOSPITALIST

## 2024-03-12 PROCEDURE — 3079F DIAST BP 80-89 MM HG: CPT | Mod: CPTII,,, | Performed by: HOSPITALIST

## 2024-03-12 PROCEDURE — 93005 ELECTROCARDIOGRAM TRACING: CPT | Mod: PBBFAC | Performed by: HOSPITALIST

## 2024-03-12 PROCEDURE — 99205 OFFICE O/P NEW HI 60 MIN: CPT | Mod: S$PBB,,, | Performed by: HOSPITALIST

## 2024-03-12 PROCEDURE — 3008F BODY MASS INDEX DOCD: CPT | Mod: CPTII,,, | Performed by: HOSPITALIST

## 2024-03-12 PROCEDURE — 1159F MED LIST DOCD IN RCRD: CPT | Mod: CPTII,,, | Performed by: HOSPITALIST

## 2024-03-12 RX ORDER — ROSUVASTATIN CALCIUM 20 MG/1
20 TABLET, COATED ORAL DAILY
Qty: 90 TABLET | Refills: 1 | Status: SHIPPED | OUTPATIENT
Start: 2024-03-12 | End: 2024-03-15 | Stop reason: SDUPTHER

## 2024-03-12 RX ORDER — DAPAGLIFLOZIN 5 MG/1
5 TABLET, FILM COATED ORAL DAILY
Qty: 90 TABLET | Refills: 1 | Status: SHIPPED | OUTPATIENT
Start: 2024-03-12 | End: 2024-03-15 | Stop reason: SDUPTHER

## 2024-03-12 RX ORDER — SACUBITRIL AND VALSARTAN 24; 26 MG/1; MG/1
1 TABLET, FILM COATED ORAL 2 TIMES DAILY
Qty: 180 TABLET | Refills: 1 | Status: SHIPPED | OUTPATIENT
Start: 2024-03-12 | End: 2024-03-15 | Stop reason: SDUPTHER

## 2024-03-12 NOTE — PROGRESS NOTES
CARDIOVASCULAR CONSULTATION        REASON FOR CONSULT:   Kirk Villar is a 58 y.o. female who presents for   Chief Complaint   Patient presents with    Edema     Feet and legs swelling but goes down with elevation    Shortness of Breath     With and without exertion and while lying flat of back          HISTORY OF PRESENT ILLNESS:   57 yo who  has a past medical history of Depression and Diabetes mellitus, type 2.  She states she has previously had 3 stents, 1 of which is thrombosed after she presented to ER in [facility] w/ ACS  and underwent PCI , then did not receive DAPT at discharge, was readmitted w/ ACS 2/2 acute stent thrombosis, and only 2 of the stents were able to be recanalized. She states her EF around that time was 25% by her recollection. Today she presents with c/o LE edema - states she also has two stents in each leg for a total of 4 peripheral stents of her BLE.     We reviewed her TTE from 24 which showed EF 40-45% with diastolic dysfunction in context of concentric hypertrophy. Reassured her this is a good thing - EF has nearly doubled since her MI.     She has marked hyperlipidemia on labs, but is not presently on a statin. This is possibly suggestive of familial mixed hyperlipidemia.   She is also diabetic and on insulin - she is no longer on ozempic - states it made her too sick.     Today we discussed management of her hyperlipidemia not on statins, her acei- related cough, uncontrolled diabetes, and initiation of GDMT for partially recovered HFmEF 2/2 ICM.   Lab Results   Component Value Date    HGBA1C 11.6 (H) 2022       She states her father had heart disease and  of CHF related complications; first mi in 60s,  at age 78.    She states her mother had very high cholesterol.         PAST MEDICAL HISTORY:     Past Medical History:   Diagnosis Date    Depression     Diabetes mellitus, type 2        PAST SURGICAL HISTORY:   No past surgical history on  file.    ALLERGIES AND MEDICATION:   Review of patient's allergies indicates:  No Known Allergies     Medication List       Accurate as of March 12, 2024 10:48 AM.  If you have any questions, ask your nurse or doctor.       Continue taking these medications      albuterol 90 mcg/actuation inhaler  Commonly known as: VENTOLIN HFA  Inhale 2 puffs into the lungs every 6 (six) hours as needed for Wheezing. Rescue     ALPRAZolam 0.25 MG tablet  Commonly known as: XANAX  Take 1 tablet (0.25 mg total) by mouth 2 (two) times daily as needed for Anxiety.     blood sugar diagnostic Strp  Commonly known as: ACCU-CHEK GUIDE TEST STRIPS  1 strip by Misc.(Non-Drug; Combo Route) route 3 (three) times daily.     citalopram 40 MG tablet  Commonly known as: CeleXA  Take 1 tablet (40 mg total) by mouth once daily.     clopidogreL 75 mg tablet  Commonly known as: PLAVIX  Take 1 tablet (75 mg total) by mouth once daily.     cyclobenzaprine 10 MG tablet  Commonly known as: FLEXERIL     ezetimibe 10 mg tablet  Commonly known as: ZETIA  Take 1 tablet (10 mg total) by mouth once daily.     furosemide 40 MG tablet  Commonly known as: LASIX  TAKE 1 TABLET FOUR TIMES DAILY     gabapentin 800 MG tablet  Commonly known as: NEURONTIN  Take 1 tablet (800 mg total) by mouth 4 (four) times daily.     insulin aspart U-100 100 unit/mL (3 mL) Inpn pen  Commonly known as: NovoLOG Flexpen U-100 Insulin  Inject 5-10 minutes before meals per sliding scale     insulin degludec 100 unit/mL (3 mL) insulin pen  Commonly known as: TRESIBA FLEXTOUCH U-100  Inject 20 Units into the skin every evening.     lisinopriL 5 MG tablet  Commonly known as: PRINIVIL,ZESTRIL  Take 1 tablet (5 mg total) by mouth once daily.     methylPREDNISolone 4 mg tablet  Commonly known as: MEDROL DOSEPACK  use as directed     metoprolol succinate 25 MG 24 hr tablet  Commonly known as: TOPROL-XL  Take 1 tablet (25 mg total) by mouth 2 (two) times daily.     ondansetron 4 MG  "Tbdl  Commonly known as: ZOFRAN-ODT  Take 2 tablets (8 mg total) by mouth every 6 (six) hours as needed (nausea).     * OZEMPIC 0.25 mg or 0.5 mg (2 mg/3 mL) pen injector  Generic drug: semaglutide     * OZEMPIC 0.25 mg or 0.5 mg(2 mg/1.5 mL) pen injector  Generic drug: semaglutide  Inject 0.5 mg into the skin every 7 days.     rivaroxaban 20 mg Tab  Commonly known as: XARELTO  Take 1 tablet (20 mg total) by mouth daily with dinner or evening meal.     traZODone 100 MG tablet  Commonly known as: DESYREL  Take 1 tablet (100 mg total) by mouth every evening.      * This list has 2 medication(s) that are the same as other medications prescribed for you. Read the directions carefully, and ask your doctor or other care provider to review them with you.         SOCIAL HISTORY:     Social History     Socioeconomic History    Marital status: Unknown   Tobacco Use    Smoking status: Every Day     Current packs/day: 0.50     Types: Cigarettes    Smokeless tobacco: Never   Substance and Sexual Activity    Alcohol use: Yes     Comment: occasionally    Drug use: Never    Sexual activity: Not Currently       FAMILY HISTORY:     Family History   Problem Relation Age of Onset    Cancer Mother     Heart failure Father        REVIEW OF SYSTEMS:       Cardiology focused 12-point ROS otherwise unremarkable except for as mentioned in HPI and A/P.   Pertinent Positives and Negatives documented herein.       PHYSICAL EXAM:     Vitals:    03/12/24 1025   BP: 130/80   Pulse: 91    Body mass index is 34.09 kg/m².  Weight: 94.3 kg (208 lb)   Height: 5' 5.5" (166.4 cm)     Physical Exam      DATA:     Laboratory:  CBC:  Recent Labs   Lab 11/30/22  1400 02/09/24  1556   WBC 7.70 14.64 H   Hemoglobin 14.8 14.5   Hematocrit 43.4 42.5   Platelet Count 318 378       CHEMISTRIES:  Recent Labs   Lab 11/30/22  1400 02/09/24  1556   Glucose 319 H 144 H   Sodium 132 L 136   Potassium 4.3 3.4 L   BUN 20 H 26 H   Creatinine 1.03 H 1.21 H   Calcium 8.9 9.8 " "      CARDIAC BIOMARKERS:      No results found for: "BNP"    COAGS:        LIPIDS/LFTS:  Recent Labs   Lab 11/30/22  1400   Cholesterol 348 H   Triglycerides 449 H   HDL Cholesterol 45   Non-   AST 8 L   ALT 20       Hemoglobin A1C   Date Value Ref Range Status   11/30/2022 11.6 (H) 4.5 - 6.6 % Final     Comment:       Normal:               <5.7%  Pre-Diabetic:       5.7% to 6.4%  Diabetic:             >6.4%  Diabetic Goal:     <7%       TSH  Recent Labs   Lab 11/30/22  1400   TSH 0.361       The ASCVD Risk score (Laura WAGNER, et al., 2019) failed to calculate for the following reasons:    The valid total cholesterol range is 130 to 320 mg/dL     IMAGING  No orders to display       ASSESSMENT AND PLAN     There is no problem list on file for this patient.    Problem Noted   Heart Failure With Mid-Range Ejection Fraction (Hfmef) 3/12/2024    Partially recovered EF; ischemic cardiomyopathy; 25%->45% following ACS and PCI in [facility]  Plan: initiate/titrate GDMT      Ace Inhibitor Intolerance 3/12/2024   Hypertension Associated With Diabetes 3/12/2024   Uncontrolled Diabetes Mellitus With Hyperglycemia, Without Long-Term Current Use of Insulin 3/12/2024   Acc/Aha Stage C Congestive Heart Failure Due to Ischemic Cardiomyopathy 3/12/2024   Familial Hyperlipidemia 3/12/2024    Plan to (re) initiate crestor  Did not tolerate lipitor       HFmEF and ACEi intolerance, HTN uncontrolled  -initiate entresto 24/26 - discussed 36h washout period - will start 36-48h after last lisinopril dose, stop lisinopril    Familial hyperlipidemia  -initiate crestor 20mg    Uncontrolled DM2 - initiate farxiga 5mg po daily, refer to DM longitudinal NP clinic     Ischemic cardiomyopathy  -EF improving; will discuss further ischemic eval via e.g. nuclear stress if symptoms persist following GDMT initiation/titration.    Follow-up in 1 mo      No orders of the defined types were placed in this encounter.              This note was " dictated with the help of speech recognition software.  There might be un-intended errors and/or substitutions.

## 2024-03-15 DIAGNOSIS — I25.5 ACC/AHA STAGE C CONGESTIVE HEART FAILURE DUE TO ISCHEMIC CARDIOMYOPATHY: ICD-10-CM

## 2024-03-15 DIAGNOSIS — E11.65 TYPE 2 DIABETES MELLITUS WITH HYPERGLYCEMIA, WITH LONG-TERM CURRENT USE OF INSULIN: ICD-10-CM

## 2024-03-15 DIAGNOSIS — I50.9 ACC/AHA STAGE C CONGESTIVE HEART FAILURE DUE TO ISCHEMIC CARDIOMYOPATHY: ICD-10-CM

## 2024-03-15 DIAGNOSIS — Z79.4 TYPE 2 DIABETES MELLITUS WITH HYPERGLYCEMIA, WITH LONG-TERM CURRENT USE OF INSULIN: ICD-10-CM

## 2024-03-18 RX ORDER — METOPROLOL SUCCINATE 25 MG/1
25 TABLET, EXTENDED RELEASE ORAL 2 TIMES DAILY
Qty: 180 TABLET | Refills: 2 | Status: SHIPPED | OUTPATIENT
Start: 2024-03-18

## 2024-03-18 RX ORDER — SEMAGLUTIDE 0.68 MG/ML
0.5 INJECTION, SOLUTION SUBCUTANEOUS
Qty: 3 ML | Refills: 2 | Status: SHIPPED | OUTPATIENT
Start: 2024-03-18

## 2024-03-18 RX ORDER — FUROSEMIDE 40 MG/1
TABLET ORAL
Qty: 360 TABLET | Refills: 3 | Status: SHIPPED | OUTPATIENT
Start: 2024-03-18

## 2024-03-18 RX ORDER — SACUBITRIL AND VALSARTAN 24; 26 MG/1; MG/1
1 TABLET, FILM COATED ORAL 2 TIMES DAILY
Qty: 180 TABLET | Refills: 1 | Status: SHIPPED | OUTPATIENT
Start: 2024-03-18

## 2024-03-18 RX ORDER — EZETIMIBE 10 MG/1
10 TABLET ORAL DAILY
Qty: 90 TABLET | Refills: 2 | Status: SHIPPED | OUTPATIENT
Start: 2024-03-18

## 2024-03-18 RX ORDER — CYCLOBENZAPRINE HCL 10 MG
10 TABLET ORAL 2 TIMES DAILY PRN
Qty: 60 TABLET | Refills: 2 | Status: SHIPPED | OUTPATIENT
Start: 2024-03-18

## 2024-03-18 RX ORDER — GABAPENTIN 800 MG/1
800 TABLET ORAL 4 TIMES DAILY
Qty: 120 TABLET | Refills: 2 | Status: SHIPPED | OUTPATIENT
Start: 2024-03-18 | End: 2024-06-16

## 2024-03-18 RX ORDER — DAPAGLIFLOZIN 5 MG/1
5 TABLET, FILM COATED ORAL DAILY
Qty: 90 TABLET | Refills: 1 | Status: SHIPPED | OUTPATIENT
Start: 2024-03-18

## 2024-03-18 RX ORDER — TRAZODONE HYDROCHLORIDE 100 MG/1
100 TABLET ORAL NIGHTLY
Qty: 90 TABLET | Refills: 2 | Status: SHIPPED | OUTPATIENT
Start: 2024-03-18

## 2024-03-18 RX ORDER — INSULIN ASPART 100 [IU]/ML
INJECTION, SOLUTION INTRAVENOUS; SUBCUTANEOUS
Qty: 2 EACH | Refills: 2 | Status: SHIPPED | OUTPATIENT
Start: 2024-03-18

## 2024-03-18 RX ORDER — ALBUTEROL SULFATE 90 UG/1
2 AEROSOL, METERED RESPIRATORY (INHALATION) EVERY 6 HOURS PRN
Qty: 18 G | Refills: 2 | Status: SHIPPED | OUTPATIENT
Start: 2024-03-18 | End: 2025-03-18

## 2024-03-18 RX ORDER — CLOPIDOGREL BISULFATE 75 MG/1
75 TABLET ORAL DAILY
Qty: 90 TABLET | Refills: 2 | Status: SHIPPED | OUTPATIENT
Start: 2024-03-18

## 2024-03-18 RX ORDER — ROSUVASTATIN CALCIUM 20 MG/1
20 TABLET, COATED ORAL DAILY
Qty: 90 TABLET | Refills: 1 | Status: SHIPPED | OUTPATIENT
Start: 2024-03-18 | End: 2025-03-18

## 2024-03-18 RX ORDER — CITALOPRAM 40 MG/1
40 TABLET, FILM COATED ORAL DAILY
Qty: 90 TABLET | Refills: 2 | Status: SHIPPED | OUTPATIENT
Start: 2024-03-18

## 2024-04-03 ENCOUNTER — CLINICAL SUPPORT (OUTPATIENT)
Dept: PULMONOLOGY | Facility: HOSPITAL | Age: 59
End: 2024-04-03
Attending: STUDENT IN AN ORGANIZED HEALTH CARE EDUCATION/TRAINING PROGRAM
Payer: MEDICARE

## 2024-04-03 ENCOUNTER — TELEPHONE (OUTPATIENT)
Dept: PULMONOLOGY | Facility: CLINIC | Age: 59
End: 2024-04-03
Payer: MEDICARE

## 2024-04-03 ENCOUNTER — OFFICE VISIT (OUTPATIENT)
Dept: PULMONOLOGY | Facility: CLINIC | Age: 59
End: 2024-04-03
Attending: STUDENT IN AN ORGANIZED HEALTH CARE EDUCATION/TRAINING PROGRAM
Payer: MEDICARE

## 2024-04-03 VITALS
HEART RATE: 73 BPM | SYSTOLIC BLOOD PRESSURE: 128 MMHG | HEIGHT: 65 IN | WEIGHT: 205 LBS | OXYGEN SATURATION: 96 % | DIASTOLIC BLOOD PRESSURE: 64 MMHG | BODY MASS INDEX: 34.16 KG/M2 | RESPIRATION RATE: 16 BRPM

## 2024-04-03 VITALS — HEIGHT: 65 IN | OXYGEN SATURATION: 97 % | WEIGHT: 200 LBS | BODY MASS INDEX: 33.32 KG/M2

## 2024-04-03 DIAGNOSIS — Z72.0 NICOTINE ABUSE: Primary | ICD-10-CM

## 2024-04-03 DIAGNOSIS — R06.2 WHEEZING: ICD-10-CM

## 2024-04-03 DIAGNOSIS — J45.20 MILD INTERMITTENT ASTHMA WITHOUT COMPLICATION: ICD-10-CM

## 2024-04-03 DIAGNOSIS — F17.210 TOBACCO DEPENDENCE DUE TO CIGARETTES: ICD-10-CM

## 2024-04-03 PROCEDURE — 1159F MED LIST DOCD IN RCRD: CPT | Mod: CPTII,,, | Performed by: STUDENT IN AN ORGANIZED HEALTH CARE EDUCATION/TRAINING PROGRAM

## 2024-04-03 PROCEDURE — 99407 BEHAV CHNG SMOKING > 10 MIN: CPT | Mod: S$PBB,,, | Performed by: STUDENT IN AN ORGANIZED HEALTH CARE EDUCATION/TRAINING PROGRAM

## 2024-04-03 PROCEDURE — 99215 OFFICE O/P EST HI 40 MIN: CPT | Mod: S$PBB,25,, | Performed by: STUDENT IN AN ORGANIZED HEALTH CARE EDUCATION/TRAINING PROGRAM

## 2024-04-03 PROCEDURE — 94618 PULMONARY STRESS TESTING: CPT

## 2024-04-03 PROCEDURE — 99215 OFFICE O/P EST HI 40 MIN: CPT | Mod: PBBFAC,25 | Performed by: STUDENT IN AN ORGANIZED HEALTH CARE EDUCATION/TRAINING PROGRAM

## 2024-04-03 PROCEDURE — 94729 DIFFUSING CAPACITY: CPT

## 2024-04-03 PROCEDURE — 3078F DIAST BP <80 MM HG: CPT | Mod: CPTII,,, | Performed by: STUDENT IN AN ORGANIZED HEALTH CARE EDUCATION/TRAINING PROGRAM

## 2024-04-03 PROCEDURE — 4010F ACE/ARB THERAPY RXD/TAKEN: CPT | Mod: CPTII,,, | Performed by: STUDENT IN AN ORGANIZED HEALTH CARE EDUCATION/TRAINING PROGRAM

## 2024-04-03 PROCEDURE — G0296 VISIT TO DETERM LDCT ELIG: HCPCS | Mod: ,,, | Performed by: STUDENT IN AN ORGANIZED HEALTH CARE EDUCATION/TRAINING PROGRAM

## 2024-04-03 PROCEDURE — 94060 EVALUATION OF WHEEZING: CPT | Mod: 26,59,, | Performed by: STUDENT IN AN ORGANIZED HEALTH CARE EDUCATION/TRAINING PROGRAM

## 2024-04-03 PROCEDURE — 94727 GAS DIL/WSHOT DETER LNG VOL: CPT | Mod: 26,,, | Performed by: STUDENT IN AN ORGANIZED HEALTH CARE EDUCATION/TRAINING PROGRAM

## 2024-04-03 PROCEDURE — 3074F SYST BP LT 130 MM HG: CPT | Mod: CPTII,,, | Performed by: STUDENT IN AN ORGANIZED HEALTH CARE EDUCATION/TRAINING PROGRAM

## 2024-04-03 PROCEDURE — 94618 PULMONARY STRESS TESTING: CPT | Mod: 26,,, | Performed by: STUDENT IN AN ORGANIZED HEALTH CARE EDUCATION/TRAINING PROGRAM

## 2024-04-03 PROCEDURE — 94726 PLETHYSMOGRAPHY LUNG VOLUMES: CPT

## 2024-04-03 PROCEDURE — 3008F BODY MASS INDEX DOCD: CPT | Mod: CPTII,,, | Performed by: STUDENT IN AN ORGANIZED HEALTH CARE EDUCATION/TRAINING PROGRAM

## 2024-04-03 PROCEDURE — 94726 PLETHYSMOGRAPHY LUNG VOLUMES: CPT | Mod: PBBFAC | Performed by: STUDENT IN AN ORGANIZED HEALTH CARE EDUCATION/TRAINING PROGRAM

## 2024-04-03 PROCEDURE — 94729 DIFFUSING CAPACITY: CPT | Mod: 26,,, | Performed by: STUDENT IN AN ORGANIZED HEALTH CARE EDUCATION/TRAINING PROGRAM

## 2024-04-03 PROCEDURE — 94729 DIFFUSING CAPACITY: CPT | Mod: PBBFAC | Performed by: STUDENT IN AN ORGANIZED HEALTH CARE EDUCATION/TRAINING PROGRAM

## 2024-04-03 PROCEDURE — 94060 EVALUATION OF WHEEZING: CPT

## 2024-04-03 PROCEDURE — 94618 PULMONARY STRESS TESTING: CPT | Mod: PBBFAC | Performed by: STUDENT IN AN ORGANIZED HEALTH CARE EDUCATION/TRAINING PROGRAM

## 2024-04-03 RX ORDER — NICOTINE 7MG/24HR
1 PATCH, TRANSDERMAL 24 HOURS TRANSDERMAL DAILY
Qty: 14 PATCH | Refills: 6 | Status: SHIPPED | OUTPATIENT
Start: 2024-04-03

## 2024-04-03 RX ORDER — FLUTICASONE PROPIONATE AND SALMETEROL 250; 50 UG/1; UG/1
1 POWDER RESPIRATORY (INHALATION) 2 TIMES DAILY
Qty: 60 EACH | Refills: 11 | Status: SHIPPED | OUTPATIENT
Start: 2024-04-03 | End: 2025-04-03

## 2024-04-03 RX ORDER — MICONAZOLE NITRATE 2 %
2 CREAM (GRAM) TOPICAL
Qty: 100 EACH | Refills: 0 | Status: SHIPPED | OUTPATIENT
Start: 2024-04-03

## 2024-04-03 NOTE — TELEPHONE ENCOUNTER
The Medicare rule in the Prescription Drug Manual (Chapter 6, Section 10.10) says over-the-counter (OTC) drugs are excluded from Medicare Part D coverage. Your drug is an over-the-counter drug. Per Medicare rules, it is not covered.   THIS IS THE RESPONSE FROM COVER MY MEDS FOR THE NICOTINE PATCHES.

## 2024-04-03 NOTE — PROGRESS NOTES
Patient Name: Kirk Villar   Primary Care Provider: [facility]   Date of Service: 02/09/2024   Reason for Referral:  Shortness of breath    Chief Complaint: Results (Follow up on PFT and Stress test.) and Shortness of Breath      Subjective:      Kirk Villar is 58 y.o. female with Past medical history significant for coronary artery disease with CHF (tells me it is reduced but unknown ejection fraction), lost to follow up with outside cardiology on Plavix, 160 mg of Lasix who presents to the Pulmonary Clinic for evaluation of shortness of breath.      Follow up clinic visit 4/3/24  In the interim, the patient had a basic metabolic panel performed today which showed elevation to 1.37.  She also met with the cardiologist earlier this month.  Her transthoracic echocardiogram from February 2024 showed an ejection fraction 40-45% with diastolic dysfunction (with concentric hypertrophy).  I saw and personally reviewed her pulmonary function tests from today which showed evidence of preserved ratio impaired spirometry with a significant bronchodilator effect and mildly decreased DLCO.  She is significantly improved in terms of her dyspnea and does not appear volume overloaded on exam today.  She has been continuing on 80 mg of Lasix daily.    Initial clinic visit 2/9/24  She presents today stating that she has been experiencing increasing shortness of breath gradually over the last two weeks associated with worsening orthopnea and lower extremity edema.  She states that her edema is worse at night and she continues to take 160 mg daily of Lasix.  She was followed by Cardiology at an outside hospital but has been lost to follow up over the last six months.  Her shortness of breath has been gradually increasing for the last three weeks.  She denies any prolonged immobility or unilateral leg swelling.  She is saturating 95% today on room air and her heart rate is at 78.  She continues to smoke one pack per  day (has a 20-30 pack-year history of smoking).       Personal Diagnostics Review  I personally reviewed and interpreted the following     As documented above.     Assessment and Plan      CHF exacerbation, now resolved   Acute kidney injury      Assessment:  Patient has a history of heart failure with reduced ejection fraction with improved ejection fraction.  Recently seen by cardiologist.  Electrolytes/renal function was ordered by her other physicians.  She appears euvolemic on exam today.      Plan  Given clinical euvolemia, recommended to cut back to Lasix to 40 mg daily for the interim.  We will defer ongoing management of diuretics to patient's cardiologist/primary care physician (she will reach out to them so that there is single physician managing her diuretic med regimen).  I recommended reduction in dose from 80 mg to 40 mg daily for the next 1-2 days given slight increase in creatinine and euvolemia on clinical exam.           Dyspnea, improved   Asthma-COPD overlap syndrome    Assessment:  Based on the patient's pulmonary function tests today she has evidence of preserved ratio impaired spirometry with a profound bronchodilator response.  Mildly decreased DLCO.  Patient tells me that she was exposed to chemicals at the age of [age] years and was told she had some injury to her lungs.  May be inconsequential, given development of asthma/COPD nearly six decades later but history is noted.  6 minute walk test within normal limits without evidence of desaturation requiring oxygen supplementation        Plan  Prescribed Laba/ics combination inhaler with instructions to rinse out mouth   Follow up in four weeks to see response.    Tobacco cessation      Assessment:  Patient currently smokes half pack per day.  However, she has recently cut down to six cigarettes per day.  We spent a significant amount of time talking about tobacco cessation, the factors that keep her smoking such as habit and cravings.  We  also discussed in details ways to stop smoking and today she is interested in nicotine replacement therapy..  Duration of counseling approximately 11 minutes.    Plan  Nicotine replacement therapy with lozenges and 7 mg nicotine patch  We will continue to monitor progress on next visit         Lung cancer screening      Assessment:  The patient meets criteria for lung cancer screening (has a greater than 20 pack-year history of smoking, is 58 years old and has not quit more than 15 years ago).  We had a discussion regarding what lung cancer screening entails and after shared decision-making, she has agreed to low-dose CT scan which she would like to get done in the near future.    Plan  Low-dose CT scan for lung cancer screening ordered and will be reviewed in four weeks' time      Follow-up   Follow-up in four weeks' time at which time we will review her low-dose CT scan, monitored tobacco cessation and response to combination bronchodilator treatment.        Gilbert Mcdaniels MD  Interventional Pulmonary and Critical Care  [facility]      Problem List Items Addressed This Visit    None            Past Medical History:   Diagnosis Date    Depression     Diabetes mellitus, type 2       History reviewed. No pertinent surgical history.  Family History   Problem Relation Age of Onset    Cancer Mother     Heart failure Father      Review of patient's allergies indicates:  No Known Allergies   Social History     Tobacco Use    Smoking status: Every Day     Current packs/day: 0.50     Types: Cigarettes    Smokeless tobacco: Never   Substance Use Topics    Alcohol use: Yes     Comment: occasionally    Drug use: Never      Review of Systems       Objective:      Physical Exam  Constitutional:       General: She is not in acute distress.     Appearance: Normal appearance. She is normal weight. She is not ill-appearing or diaphoretic.   HENT:      Head: Normocephalic and atraumatic.      Nose: No congestion or  "rhinorrhea.      Mouth/Throat:      Mouth: Mucous membranes are moist.   Cardiovascular:      Rate and Rhythm: Normal rate and regular rhythm.      Pulses: Normal pulses.      Heart sounds: Normal heart sounds.   Pulmonary:      Effort: Pulmonary effort is normal. No respiratory distress.      Breath sounds: No stridor. No wheezing, rhonchi or rales.   Chest:      Chest wall: No tenderness.   Abdominal:      General: Abdomen is flat.   Musculoskeletal:      Cervical back: Normal range of motion. No rigidity.      Right lower leg: Edema present.      Left lower leg: Edema present.      Comments: Minimal bilateral lower extremity edema, significant improved from prior examination   Skin:     General: Skin is warm.      Findings: No erythema.   Neurological:      General: No focal deficit present.      Mental Status: She is alert and oriented to person, place, and time. Mental status is at baseline.   Psychiatric:         Mood and Affect: Mood normal.         Behavior: Behavior normal.         Thought Content: Thought content normal.                4/3/2024     1:06 PM 4/3/2024    11:45 AM 4/3/2024    10:30 AM 3/12/2024    10:25 AM 2/26/2024     3:14 PM 2/9/2024    11:31 AM 1/24/2024     1:12 PM   Pulmonary Function Tests   FVC  2.03 liters        FVC%  62        FEV1  1.59 liters        FEV1%  62        FEF 25-75  1.4        FEF 25-75%  61        TLC (liters)  4.43 liters        TLC%  85        RV  2.25        RV%  110        DLCO (ml/mmHg sec)  12.89 ml/mmHg sec        DLCO%  62        Peak Flow  306 L/min        FiO2 (%)  21 %        SpO2 96 % 97 %  95 %  95 % 95 %   Ordering Provider   PAULINA HOLT MD       Performing nurse/tech/RT   BAY OCHOA CRT       Diagnosis   Shortness of Breath       Height 5' 5" (1.651 m)  5' 5" (1.651 m) 5' 5.5" (1.664 m) 5' (1.524 m) 5' (1.524 m) 5' 5.5" (1.664 m)   Weight 93 kg (205 lb)  90.7 kg (200 lb) 94.3 kg (208 lb) 92.1 kg (203 lb) 92.4 kg (203 lb 12.8 oz) 94.4 kg (208 lb 3.2 " oz)   BMI (Calculated) 34.1  33.3 34.1 39.6 39.8 34.1   Patient Race          6MWT Status   completed without stopping       Patient Reported   No complaints       Was O2 used?   No       6MW Distance walked (feet)   1097 feet       Distance walked (meters)   334.37 meters       Did patient stop?   No       Type of assistive device(s) used?   no assistive devices       Oxygen Saturation   98 %       Supplemental Oxygen   Room Air       Heart Rate   79 bpm       Blood Pressure   160/92       Shari Dyspnea Rating    light       Oxygen Saturation   96 %       Supplemental Oxygen   Room Air       Heart Rate   98 bpm       Blood Pressure   178/97       Shari Dyspnea Rating    very heavy       Recovery Time (seconds)   60 seconds       Oxygen Saturation   99 %       Supplemental Oxygen   Room Air       Heart Rate   82 bpm       Blood Pressure   159/90       Shari Dyspnea Rating    heavy             Outpatient Encounter Medications as of 4/3/2024   Medication Sig Dispense Refill    albuterol (VENTOLIN HFA) 90 mcg/actuation inhaler Inhale 2 puffs into the lungs every 6 (six) hours as needed for Wheezing. Rescue 18 g 2    ALPRAZolam (XANAX) 0.25 MG tablet Take 1 tablet (0.25 mg total) by mouth 2 (two) times daily as needed for Anxiety. (Patient not taking: Reported on 2/9/2024) 60 tablet 0    blood sugar diagnostic (ACCU-CHEK GUIDE TEST STRIPS) Strp 1 strip by Misc.(Non-Drug; Combo Route) route 3 (three) times daily. 100 strip 11    citalopram (CELEXA) 40 MG tablet Take 1 tablet (40 mg total) by mouth once daily. 90 tablet 2    clopidogreL (PLAVIX) 75 mg tablet Take 1 tablet (75 mg total) by mouth once daily. 90 tablet 2    cyclobenzaprine (FLEXERIL) 10 MG tablet Take 1 tablet (10 mg total) by mouth 2 (two) times daily as needed for Muscle spasms. 60 tablet 2    dapagliflozin propanediol (FARXIGA) 5 mg Tab tablet Take 1 tablet (5 mg total) by mouth once daily. 90 tablet 1    ezetimibe (ZETIA) 10 mg tablet Take 1 tablet  (10 mg total) by mouth once daily. 90 tablet 2    furosemide (LASIX) 40 MG tablet TAKE 1 TABLET FOUR TIMES DAILY 360 tablet 3    gabapentin (NEURONTIN) 800 MG tablet Take 1 tablet (800 mg total) by mouth 4 (four) times daily. 120 tablet 2    insulin aspart U-100 (NOVOLOG FLEXPEN U-100 INSULIN) 100 unit/mL (3 mL) InPn pen Inject 5-10 minutes before meals per sliding scale 2 each 2    insulin degludec (TRESIBA FLEXTOUCH U-100) 100 unit/mL (3 mL) insulin pen Inject 20 Units into the skin every evening. (Patient not taking: Reported on 2/9/2024) 6 mL 11    methylPREDNISolone (MEDROL DOSEPACK) 4 mg tablet use as directed 21 tablet 0    metoprolol succinate (TOPROL-XL) 25 MG 24 hr tablet Take 1 tablet (25 mg total) by mouth 2 (two) times daily. 180 tablet 2    ondansetron (ZOFRAN-ODT) 4 MG TbDL Take 2 tablets (8 mg total) by mouth every 6 (six) hours as needed (nausea). (Patient not taking: Reported on 2/9/2024) 30 tablet 0    OZEMPIC 0.25 mg or 0.5 mg (2 mg/3 mL) pen injector Inject 0.5 mg into the skin every 7 days. 3 mL 2    rivaroxaban (XARELTO) 20 mg Tab Take 1 tablet (20 mg total) by mouth daily with dinner or evening meal. 90 tablet 2    rosuvastatin (CRESTOR) 20 MG tablet Take 1 tablet (20 mg total) by mouth once daily. 90 tablet 1    sacubitriL-valsartan (ENTRESTO) 24-26 mg per tablet Take 1 tablet by mouth 2 (two) times daily. 180 tablet 1    semaglutide (OZEMPIC) 0.25 mg or 0.5 mg(2 mg/1.5 mL) pen injector Inject 0.5 mg into the skin every 7 days. (Patient not taking: Reported on 2/9/2024) 2 each 2    traZODone (DESYREL) 100 MG tablet Take 1 tablet (100 mg total) by mouth every evening. 90 tablet 2    [DISCONTINUED] albuterol (VENTOLIN HFA) 90 mcg/actuation inhaler Inhale 2 puffs into the lungs every 6 (six) hours as needed for Wheezing. Rescue 18 g 2    [DISCONTINUED] blood sugar diagnostic (ACCU-CHEK GUIDE TEST STRIPS) Strp 1 strip by Misc.(Non-Drug; Combo Route) route 3 (three) times daily. 100 strip 11     [DISCONTINUED] citalopram (CELEXA) 40 MG tablet Take 1 tablet (40 mg total) by mouth once daily. 90 tablet 2    [DISCONTINUED] clopidogreL (PLAVIX) 75 mg tablet Take 1 tablet (75 mg total) by mouth once daily. 90 tablet 2    [DISCONTINUED] cyclobenzaprine (FLEXERIL) 10 MG tablet Take 10 mg by mouth 2 (two) times daily as needed for Muscle spasms.      [DISCONTINUED] dapagliflozin propanediol (FARXIGA) 5 mg Tab tablet Take 1 tablet (5 mg total) by mouth once daily. 90 tablet 1    [DISCONTINUED] ezetimibe (ZETIA) 10 mg tablet Take 1 tablet (10 mg total) by mouth once daily. 90 tablet 2    [DISCONTINUED] furosemide (LASIX) 40 MG tablet TAKE 1 TABLET FOUR TIMES DAILY 360 tablet 3    [DISCONTINUED] gabapentin (NEURONTIN) 800 MG tablet Take 1 tablet (800 mg total) by mouth 4 (four) times daily. 120 tablet 2    [DISCONTINUED] insulin aspart U-100 (NOVOLOG FLEXPEN U-100 INSULIN) 100 unit/mL (3 mL) InPn pen Inject 5-10 minutes before meals per sliding scale 2 each 2    [DISCONTINUED] lisinopriL (PRINIVIL,ZESTRIL) 5 MG tablet Take 1 tablet (5 mg total) by mouth once daily. 90 tablet 2    [DISCONTINUED] metoprolol succinate (TOPROL-XL) 25 MG 24 hr tablet Take 1 tablet (25 mg total) by mouth 2 (two) times daily. 180 tablet 2    [DISCONTINUED] OZEMPIC 0.25 mg or 0.5 mg (2 mg/3 mL) pen injector Inject into the skin.      [DISCONTINUED] rivaroxaban (XARELTO) 20 mg Tab Take 1 tablet (20 mg total) by mouth daily with dinner or evening meal. 90 tablet 2    [DISCONTINUED] rosuvastatin (CRESTOR) 20 MG tablet Take 1 tablet (20 mg total) by mouth once daily. 90 tablet 1    [DISCONTINUED] sacubitriL-valsartan (ENTRESTO) 24-26 mg per tablet Take 1 tablet by mouth 2 (two) times daily. 180 tablet 1    [DISCONTINUED] traZODone (DESYREL) 100 MG tablet Take 1 tablet (100 mg total) by mouth every evening. 90 tablet 2     No facility-administered encounter medications on file as of 4/3/2024.       Assessment & Plan    As above                                           No orders of the defined types were placed in this encounter.

## 2024-04-09 NOTE — PROCEDURES
PFT REPORT:  SPIROMETRY:  The pre-BD FVC is moderately reduced, 2.03 L/62 % predicted  The pre-BD FEV1 is moderately reduced, 1.59 L/62 % predicted.  The pre-BD FEV1/FVC ratio is normal,  78/99 % predicted    The post-BD FVC is mildly reduced, 2.44 L/75 % predicted  The post-BD FEV1 is mildly reduced, 1.86 L/73 % predicted  The post-BD FEV1/FVC ratio is preserved,  76/96 % predicted    There is a significant bronchodilator effect.  20% change in FVC and 17% change in FEV1 following bronchodilator administration.    LUNG VOLUMES:  The TLC is normal, 4.43 L/85 % predicted.  The RV is not elevated, 2.25 L/110 % predicted.  The RV/TLC is mildly elevated, 51/131 % predicted.  The FRC is not elevated, 2.78 L/94 % predicted.  The ERV is not elevated, 0.53 L/58 % predicted.      DIFFUSION CAPACITY:  The diffusion capacity is corrected for hemoglobin and is mildly decreased, 12.49/60 % predicted.    Interpretation:    Spirometry is impaired.    Lung volumes are grossly normal.  Diffusion capacity is mildly decreased.  The combination of above results suggest preserved ratio impaired spirometry with evidence of bronchodilator change, suggestive of possible reactive airway disease such as asthma.  Decreased DLCO suggestive of diffuse parenchymal lung disease.  Please clinically correlate.    Gilbert Mcdaniels MD  Interventional Pulmonary and Critical Care  [facility]

## 2024-04-09 NOTE — PROCEDURES
SIX MINUTE WALK DISTANCE  BASELINE VITALS  BP:  160/92  HR:  79  SPO2:  98% on room air  Modified NAIL Dyspnea Scale Score:  Two  Modified ANIL Fatigue Scale Score:  0    END OF STUDY VITALS:  BP:  178/96  HR:  98  SPO2:  96% on room air  Modified ANIL Dyspnea Scale Score:  Eight  Modified ANIL Fatigue Scale Score:  Nine    Patient walked 1097 ft which is 73% of expected distance  SpO2 diego was 95 % at five minutes.   Oxygen was not used      Gilbert Mcdaniels MD  Interventional Pulmonary and Critical Care  [facility]

## 2024-04-10 ENCOUNTER — PATIENT MESSAGE (OUTPATIENT)
Dept: ADMINISTRATIVE | Facility: HOSPITAL | Age: 59
End: 2024-04-10

## 2024-04-10 ENCOUNTER — PATIENT OUTREACH (OUTPATIENT)
Dept: ADMINISTRATIVE | Facility: HOSPITAL | Age: 59
End: 2024-04-10

## 2024-04-10 NOTE — PROGRESS NOTES
Called, no answer  Portal msg sent to see if she has had any of the care gaps completed so that I can request and upload to close gap. Pt was notified that if she would like to be schedule for something then I can help her.

## 2024-05-01 ENCOUNTER — HOSPITAL ENCOUNTER (OUTPATIENT)
Dept: RADIOLOGY | Facility: HOSPITAL | Age: 59
Discharge: HOME | End: 2024-05-01
Attending: STUDENT IN AN ORGANIZED HEALTH CARE EDUCATION/TRAINING PROGRAM
Payer: MEDICARE

## 2024-05-01 ENCOUNTER — OFFICE VISIT (OUTPATIENT)
Dept: PULMONOLOGY | Facility: CLINIC | Age: 59
End: 2024-05-01
Payer: MEDICARE

## 2024-05-01 VITALS
BODY MASS INDEX: 34.32 KG/M2 | WEIGHT: 206 LBS | DIASTOLIC BLOOD PRESSURE: 64 MMHG | HEIGHT: 65 IN | HEART RATE: 87 BPM | SYSTOLIC BLOOD PRESSURE: 114 MMHG | RESPIRATION RATE: 16 BRPM | OXYGEN SATURATION: 96 %

## 2024-05-01 VITALS — BODY MASS INDEX: 32.95 KG/M2 | HEIGHT: 66 IN | WEIGHT: 205 LBS

## 2024-05-01 DIAGNOSIS — I50.20 SYSTOLIC CONGESTIVE HEART FAILURE, UNSPECIFIED HF CHRONICITY: ICD-10-CM

## 2024-05-01 DIAGNOSIS — Z72.0 NICOTINE ABUSE: ICD-10-CM

## 2024-05-01 DIAGNOSIS — F17.210 TOBACCO DEPENDENCE DUE TO CIGARETTES: ICD-10-CM

## 2024-05-01 DIAGNOSIS — R06.02 SOB (SHORTNESS OF BREATH): ICD-10-CM

## 2024-05-01 DIAGNOSIS — F17.210 NICOTINE DEPENDENCE, CIGARETTES, UNCOMPLICATED: Primary | ICD-10-CM

## 2024-05-01 DIAGNOSIS — J45.20 MILD INTERMITTENT ASTHMA WITHOUT COMPLICATION: ICD-10-CM

## 2024-05-01 PROCEDURE — 3078F DIAST BP <80 MM HG: CPT | Mod: CPTII,,, | Performed by: STUDENT IN AN ORGANIZED HEALTH CARE EDUCATION/TRAINING PROGRAM

## 2024-05-01 PROCEDURE — 3008F BODY MASS INDEX DOCD: CPT | Mod: CPTII,,, | Performed by: STUDENT IN AN ORGANIZED HEALTH CARE EDUCATION/TRAINING PROGRAM

## 2024-05-01 PROCEDURE — 3074F SYST BP LT 130 MM HG: CPT | Mod: CPTII,,, | Performed by: STUDENT IN AN ORGANIZED HEALTH CARE EDUCATION/TRAINING PROGRAM

## 2024-05-01 PROCEDURE — 71271 CT THORAX LUNG CANCER SCR C-: CPT | Mod: 26,,, | Performed by: RADIOLOGY

## 2024-05-01 PROCEDURE — 99215 OFFICE O/P EST HI 40 MIN: CPT | Mod: PBBFAC,25 | Performed by: STUDENT IN AN ORGANIZED HEALTH CARE EDUCATION/TRAINING PROGRAM

## 2024-05-01 PROCEDURE — 71271 CT THORAX LUNG CANCER SCR C-: CPT | Mod: TC

## 2024-05-01 PROCEDURE — 99407 BEHAV CHNG SMOKING > 10 MIN: CPT | Mod: S$PBB,,, | Performed by: STUDENT IN AN ORGANIZED HEALTH CARE EDUCATION/TRAINING PROGRAM

## 2024-05-01 PROCEDURE — 99214 OFFICE O/P EST MOD 30 MIN: CPT | Mod: S$PBB,25,, | Performed by: STUDENT IN AN ORGANIZED HEALTH CARE EDUCATION/TRAINING PROGRAM

## 2024-05-01 PROCEDURE — 4010F ACE/ARB THERAPY RXD/TAKEN: CPT | Mod: CPTII,,, | Performed by: STUDENT IN AN ORGANIZED HEALTH CARE EDUCATION/TRAINING PROGRAM
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
## 2024-05-06 ENCOUNTER — HOSPITAL ENCOUNTER (EMERGENCY)
Facility: HOSPITAL | Age: 59
Discharge: HOME | End: 2024-05-06
Payer: MEDICARE

## 2024-05-06 VITALS
RESPIRATION RATE: 18 BRPM | WEIGHT: 208 LBS | OXYGEN SATURATION: 95 % | DIASTOLIC BLOOD PRESSURE: 67 MMHG | HEART RATE: 72 BPM | TEMPERATURE: 98 F | BODY MASS INDEX: 34.61 KG/M2 | SYSTOLIC BLOOD PRESSURE: 127 MMHG

## 2024-05-06 DIAGNOSIS — S06.0X1A CONCUSSION WITH LOSS OF CONSCIOUSNESS OF 30 MINUTES OR LESS, INITIAL ENCOUNTER: Primary | ICD-10-CM
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
## 2024-05-06 DIAGNOSIS — G44.311 INTRACTABLE ACUTE POST-TRAUMATIC HEADACHE: ICD-10-CM

## 2024-05-06 DIAGNOSIS — S09.90XA TRAUMATIC INJURY OF HEAD, INITIAL ENCOUNTER: ICD-10-CM

## 2024-05-06 PROCEDURE — 99285 EMERGENCY DEPT VISIT HI MDM: CPT | Mod: ,,, | Performed by: NURSE PRACTITIONER

## 2024-05-06 PROCEDURE — 96372 THER/PROPH/DIAG INJ SC/IM: CPT | Performed by: NURSE PRACTITIONER

## 2024-05-06 PROCEDURE — 63600175 PHARM REV CODE 636 W HCPCS: Performed by: NURSE PRACTITIONER

## 2024-05-06 PROCEDURE — 99285 EMERGENCY DEPT VISIT HI MDM: CPT | Mod: 25

## 2024-05-06 RX ORDER — ONDANSETRON HYDROCHLORIDE 2 MG/ML
4 INJECTION, SOLUTION INTRAVENOUS
Status: COMPLETED | OUTPATIENT
Start: 2024-05-06 | End: 2024-05-06

## 2024-05-06 RX ORDER — HYDROCODONE BITARTRATE AND ACETAMINOPHEN 5; 325 MG/1; MG/1
1 TABLET ORAL EVERY 6 HOURS PRN
Qty: 12 TABLET | Refills: 0 | Status: SHIPPED | OUTPATIENT
Start: 2024-05-06 | End: 2024-05-09

## 2024-05-06 RX ORDER — MORPHINE SULFATE 4 MG/ML
4 INJECTION, SOLUTION INTRAMUSCULAR; INTRAVENOUS
Status: COMPLETED | OUTPATIENT
Start: 2024-05-06 | End: 2024-05-06

## 2024-05-06 RX ADMIN — MORPHINE SULFATE 4 MG: 4 INJECTION INTRAVENOUS at 11:05

## 2024-05-06 RX ADMIN — ONDANSETRON 4 MG: 2 INJECTION INTRAMUSCULAR; INTRAVENOUS at 11:05

## 2024-05-07 ENCOUNTER — TELEPHONE (OUTPATIENT)
Dept: EMERGENCY MEDICINE | Facility: HOSPITAL | Age: 59
End: 2024-05-07
Payer: MEDICARE

## 2024-05-07 NOTE — DISCHARGE INSTRUCTIONS
Take pain medication as directed.    Follow up with PCP in 1 week for recheck.    Return to ER for new or worsening symptoms.    Brain rest. No strenuous activity or activities such as climbing or sports that will put you at risk of second head injury.
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
## 2024-05-21 LAB
OHS QRS DURATION: 80 MS
OHS QTC CALCULATION: 483 MS

## 2024-05-27 ENCOUNTER — DOCUMENTATION ONLY (OUTPATIENT)
Dept: RADIOLOGY | Facility: HOSPITAL | Age: 59
End: 2024-05-27
Payer: MEDICARE

## 2024-07-01 ENCOUNTER — OFFICE VISIT (OUTPATIENT)
Dept: FAMILY MEDICINE | Facility: CLINIC | Age: 59
End: 2024-07-01
Payer: MEDICARE

## 2024-07-01 VITALS
TEMPERATURE: 97 F | SYSTOLIC BLOOD PRESSURE: 120 MMHG | OXYGEN SATURATION: 94 % | RESPIRATION RATE: 20 BRPM | HEIGHT: 65 IN | WEIGHT: 221 LBS | DIASTOLIC BLOOD PRESSURE: 66 MMHG | BODY MASS INDEX: 36.82 KG/M2 | HEART RATE: 84 BPM

## 2024-07-01 DIAGNOSIS — R06.02 SOB (SHORTNESS OF BREATH): Primary | ICD-10-CM

## 2024-07-01 PROCEDURE — 3074F SYST BP LT 130 MM HG: CPT | Mod: CPTII,,, | Performed by: NURSE PRACTITIONER

## 2024-07-01 PROCEDURE — 1160F RVW MEDS BY RX/DR IN RCRD: CPT | Mod: CPTII,,, | Performed by: NURSE PRACTITIONER

## 2024-07-01 PROCEDURE — 4010F ACE/ARB THERAPY RXD/TAKEN: CPT | Mod: CPTII,,, | Performed by: NURSE PRACTITIONER

## 2024-07-01 PROCEDURE — 99213 OFFICE O/P EST LOW 20 MIN: CPT | Mod: ,,, | Performed by: NURSE PRACTITIONER

## 2024-07-01 PROCEDURE — 3008F BODY MASS INDEX DOCD: CPT | Mod: CPTII,,, | Performed by: NURSE PRACTITIONER

## 2024-07-01 PROCEDURE — 3078F DIAST BP <80 MM HG: CPT | Mod: CPTII,,, | Performed by: NURSE PRACTITIONER

## 2024-07-01 PROCEDURE — 1159F MED LIST DOCD IN RCRD: CPT | Mod: CPTII,,, | Performed by: NURSE PRACTITIONER

## 2024-07-01 NOTE — PROGRESS NOTES
LEX Segovia   [facility]  [address] [facility] [address]  [phone]      PATIENT NAME: Kirk Villar  : 1965  DATE: 24  MRN: [id number]      Billing Provider: LEX Segovia  Level of Service:   Patient PCP Information       Provider PCP Type    Anabell Mendez NP General            Reason for Visit / Chief Complaint: SOB      Update PCP  Update Chief Complaint         History of Present Illness / Problem Focused Workflow     Shortness of Breath  This is a new problem. The current episode started in the past 7 days. The problem occurs constantly. The problem has been gradually worsening. Associated symptoms include leg swelling, orthopnea and wheezing. Pertinent negatives include no abdominal pain, chest pain, claudication, coryza, ear pain, fever, headaches, hemoptysis, leg pain, neck pain, PND, rash, rhinorrhea, sore throat, sputum production, swollen glands, syncope or vomiting. Exacerbated by: spraying permethrin in her house. Risk factors include smoking. Treatments tried: repetitive nebulizer treatments. The treatment provided no relief. Her past medical history is significant for CAD, chronic lung disease, COPD and a heart failure. There is no history of allergies, aspirin allergies, asthma, bronchiolitis, DVT, PE, pneumonia or a recent surgery.       Review of Systems     Review of Systems   Constitutional:  Negative for fever.   HENT:  Negative for ear pain, rhinorrhea and sore throat.    Respiratory:  Positive for shortness of breath and wheezing. Negative for hemoptysis and sputum production.    Cardiovascular:  Positive for orthopnea and leg swelling. Negative for chest pain, claudication, syncope and PND.   Gastrointestinal:  Negative for abdominal pain and vomiting.   Musculoskeletal:  Negative for leg pain and neck pain.   Integumentary:  Negative for rash.   Neurological:  Negative for weakness, headaches and  coordination difficulties.        Medical / Social / Family History     Past Medical History:   Diagnosis Date    Depression     Diabetes mellitus, type 2     DVT (deep venous thrombosis)     Factor 5 Leiden mutation, heterozygous            Social History  Ms. Villar  reports that she has been smoking cigarettes. She started smoking about 49 years ago. She has a 24.5 pack-year smoking history. She has never used smokeless tobacco. She reports current alcohol use. She reports that she does not use drugs.    Family History  Ms. Villar's family history includes Cancer in her mother; Heart failure in her father.    Medications and Allergies     Medications  Outpatient Medications Marked as Taking for the 7/1/24 encounter (Office Visit) with Salma Bello, LEX   Medication Sig Dispense Refill    albuterol (VENTOLIN HFA) 90 mcg/actuation inhaler Inhale 2 puffs into the lungs every 6 (six) hours as needed for Wheezing. Rescue 18 g 2    blood sugar diagnostic (ACCU-CHEK GUIDE TEST STRIPS) Strp 1 strip by Misc.(Non-Drug; Combo Route) route 3 (three) times daily. 100 strip 11    citalopram (CELEXA) 40 MG tablet Take 1 tablet (40 mg total) by mouth once daily. 90 tablet 2    clopidogreL (PLAVIX) 75 mg tablet Take 1 tablet (75 mg total) by mouth once daily. 90 tablet 2    cyclobenzaprine (FLEXERIL) 10 MG tablet Take 1 tablet (10 mg total) by mouth 2 (two) times daily as needed for Muscle spasms. 60 tablet 2    dapagliflozin propanediol (FARXIGA) 5 mg Tab tablet Take 1 tablet (5 mg total) by mouth once daily. 90 tablet 1    ezetimibe (ZETIA) 10 mg tablet Take 1 tablet (10 mg total) by mouth once daily. 90 tablet 2    fluticasone-salmeterol diskus inhaler 250-50 mcg Inhale 1 puff into the lungs 2 (two) times daily. Controller 60 each 11    furosemide (LASIX) 40 MG tablet TAKE 1 TABLET FOUR TIMES DAILY 360 tablet 3    gabapentin (NEURONTIN) 800 MG tablet Take 1 tablet (800 mg total) by mouth 4 (four) times daily. 120  tablet 2    insulin aspart U-100 (NOVOLOG FLEXPEN U-100 INSULIN) 100 unit/mL (3 mL) InPn pen Inject 5-10 minutes before meals per sliding scale 2 each 2    insulin degludec (TRESIBA FLEXTOUCH U-100) 100 unit/mL (3 mL) insulin pen Inject 20 Units into the skin every evening. 6 mL 11    metoprolol succinate (TOPROL-XL) 25 MG 24 hr tablet Take 1 tablet (25 mg total) by mouth 2 (two) times daily. 180 tablet 2    rivaroxaban (XARELTO) 20 mg Tab Take 1 tablet (20 mg total) by mouth daily with dinner or evening meal. 90 tablet 2    rosuvastatin (CRESTOR) 20 MG tablet Take 1 tablet (20 mg total) by mouth once daily. 90 tablet 1    sacubitriL-valsartan (ENTRESTO) 24-26 mg per tablet Take 1 tablet by mouth 2 (two) times daily. 180 tablet 1    traZODone (DESYREL) 100 MG tablet Take 1 tablet (100 mg total) by mouth every evening. 90 tablet 2       Allergies  Review of patient's allergies indicates:  No Known Allergies    Physical Examination     Vitals:    07/01/24 1414   BP: 120/66   Pulse: 84   Resp: 20   Temp: 97.4 °F (36.3 °C)     Physical Exam  Vitals reviewed: swelling to face.   Constitutional:       General: She is not in acute distress.     Appearance: She is obese. She is ill-appearing. She is not toxic-appearing or diaphoretic.   HENT:      Head: Normocephalic and atraumatic.   Eyes:      General: No scleral icterus.  Pulmonary:      Effort: Pulmonary effort is normal.   Musculoskeletal:      Right lower leg: Edema present.      Left lower leg: Edema present.   Neurological:      Mental Status: She is alert.   Psychiatric:         Mood and Affect: Mood normal.      Comments: Angry          Lab Results   Component Value Date    WBC 14.64 (H) 02/09/2024    HGB 14.5 02/09/2024    HCT 42.5 02/09/2024    MCV 90.2 02/09/2024     02/09/2024        Sodium   Date Value Ref Range Status   04/03/2024 136 136 - 145 mmol/L Final     Potassium   Date Value Ref Range Status   04/03/2024 3.7 3.5 - 5.1 mmol/L Final  "    Chloride   Date Value Ref Range Status   04/03/2024 100 98 - 107 mmol/L Final     CO2   Date Value Ref Range Status   04/03/2024 31 21 - 32 mmol/L Final     Glucose   Date Value Ref Range Status   04/03/2024 399 (H) 74 - 106 mg/dL Final     BUN   Date Value Ref Range Status   04/03/2024 19 (H) 7 - 18 mg/dL Final     Creatinine   Date Value Ref Range Status   04/03/2024 1.37 (H) 0.55 - 1.02 mg/dL Final     Calcium   Date Value Ref Range Status   04/03/2024 9.1 8.5 - 10.1 mg/dL Final     Total Protein   Date Value Ref Range Status   11/30/2022 7.2 6.4 - 8.2 g/dL Final     Albumin   Date Value Ref Range Status   11/30/2022 3.6 3.5 - 5.0 g/dL Final     Bilirubin, Total   Date Value Ref Range Status   11/30/2022 0.5 >0.0 - 1.2 mg/dL Final     Alk Phos   Date Value Ref Range Status   11/30/2022 83 46 - 118 U/L Final     AST   Date Value Ref Range Status   11/30/2022 8 (L) 15 - 37 U/L Final     ALT   Date Value Ref Range Status   11/30/2022 20 13 - 56 U/L Final     Anion Gap   Date Value Ref Range Status   04/03/2024 9 7 - 16 mmol/L Final     eGFR   Date Value Ref Range Status   04/03/2024 44 (L) >=60 mL/min/1.73m2 Final      Lab Results   Component Value Date    HGBA1C 11.6 (H) 11/30/2022      Lab Results   Component Value Date    CHOL 348 (H) 11/30/2022     Lab Results   Component Value Date    HDL 45 11/30/2022     No results found for: "LDLCALC"  No results found for: "DLDL"  Lab Results   Component Value Date    TRIG 449 (H) 11/30/2022     Lab Results   Component Value Date    CHOLHDL 7.7 11/30/2022      Lab Results   Component Value Date    TSH 0.361 11/30/2022        Assessment and Plan (including Health Maintenance)      Problem List  Smart Sets  Document Outside HM   :    Plan:     SOB    Health Maintenance Due   Topic Date Due    Hepatitis C Screening  Never done    Cervical Cancer Screening  Never done    Diabetes Urine Screening  Never done    Pneumococcal Vaccines (Age 0-64) (1 of 2 - PCV) Never done    " Foot Exam  Never done    Eye Exam  Never done    HIV Screening  Never done    TETANUS VACCINE  Never done    Mammogram  Never done    Colorectal Cancer Screening  Never done    Shingles Vaccine (1 of 2) Never done    Hemoglobin A1c  05/30/2023    COVID-19 Vaccine (1 - 2023-24 season) Never done    RSV Vaccine (Age 60+ and Pregnant patients) (1 - 1-dose 60+ series) Never done    Lipid Panel  11/30/2023         Health Maintenance Topics with due status: Not Due       Topic Last Completion Date    LDCT Lung Screen 05/01/2024    High Dose Statin 05/01/2024    Influenza Vaccine Not Due       Future Appointments   Date Time Provider Department Center   8/5/2024  1:40 PM Gilbert Mcdaniels MD OB  [facility]   5/1/2025  2:00 PM [facility]        Given her history of myocardial infarction and congestive heart failure, I feel that she needs further workup today.  That workup may need to be done at outlying facilities or higher level of care.  It was my intention to call to see if we can get stat labs and chest x-ray done in the outpatient setting but the patient became very upset prior to me being able to obtain that information from our manager.  I feel that she will need chest x-ray, troponin and further labs to determine what is going on with her.  She became very adamant when I suggested these measures and left clinic prior to my assessment.  She reports that it was a waste of her time to come here today and that she only needs steroids and she will be better.  She reports that it was a waste of her time to come here today.  Patient reports that she plans to present to the emergency department when she leaves clinic today.    Signature:  LEX Segovia  [facility]  [address]  [facility] [address]  [phone]    Date of encounter: 7/1/24

## 2024-07-08 ENCOUNTER — HOSPITAL ENCOUNTER (INPATIENT)
Facility: HOSPITAL | Age: 59
LOS: 3 days | Discharge: HOME | DRG: 190 | End: 2024-07-12
Attending: EMERGENCY MEDICINE | Admitting: INTERNAL MEDICINE
Payer: MEDICARE

## 2024-07-08 DIAGNOSIS — R06.02 SHORTNESS OF BREATH: ICD-10-CM

## 2024-07-08 DIAGNOSIS — J44.1 ACUTE EXACERBATION OF CHRONIC OBSTRUCTIVE PULMONARY DISEASE (COPD): ICD-10-CM

## 2024-07-08 DIAGNOSIS — E87.6 HYPOKALEMIA: ICD-10-CM

## 2024-07-08 DIAGNOSIS — F10.230 ALCOHOL DEPENDENCE WITH UNCOMPLICATED WITHDRAWAL: ICD-10-CM

## 2024-07-08 DIAGNOSIS — F17.200 TOBACCO DEPENDENCY: ICD-10-CM

## 2024-07-08 DIAGNOSIS — J96.01 ACUTE RESPIRATORY FAILURE WITH HYPOXIA: ICD-10-CM

## 2024-07-08 DIAGNOSIS — J44.1 COPD WITH ACUTE EXACERBATION: Primary | ICD-10-CM

## 2024-07-08 DIAGNOSIS — I50.23 ACUTE ON CHRONIC HFREF (HEART FAILURE WITH REDUCED EJECTION FRACTION): ICD-10-CM

## 2024-07-08 DIAGNOSIS — R07.9 CHEST PAIN: ICD-10-CM

## 2024-07-08 LAB
ALBUMIN SERPL BCP-MCNC: 2.8 G/DL (ref 3.5–5)
ALBUMIN/GLOB SERPL: 0.7 {RATIO}
ALP SERPL-CCNC: 73 U/L (ref 50–130)
ALT SERPL W P-5'-P-CCNC: 25 U/L (ref 13–56)
ANION GAP SERPL CALCULATED.3IONS-SCNC: 16 MMOL/L (ref 7–16)
AST SERPL W P-5'-P-CCNC: 32 U/L (ref 15–37)
BASOPHILS # BLD AUTO: 0.07 K/UL (ref 0–0.2)
BASOPHILS NFR BLD AUTO: 1 % (ref 0–1)
BILIRUB SERPL-MCNC: 0.5 MG/DL (ref ?–1.2)
BUN SERPL-MCNC: 15 MG/DL (ref 7–18)
BUN/CREAT SERPL: 12 (ref 6–20)
CALCIUM SERPL-MCNC: 8.2 MG/DL (ref 8.5–10.1)
CHLORIDE SERPL-SCNC: 94 MMOL/L (ref 98–107)
CO2 SERPL-SCNC: 26 MMOL/L (ref 21–32)
CREAT SERPL-MCNC: 1.3 MG/DL (ref 0.55–1.02)
DIFFERENTIAL METHOD BLD: ABNORMAL
EGFR (NO RACE VARIABLE) (RUSH/TITUS): 47 ML/MIN/1.73M2
EOSINOPHIL # BLD AUTO: 0.3 K/UL (ref 0–0.5)
EOSINOPHIL NFR BLD AUTO: 4.5 % (ref 1–4)
ERYTHROCYTE [DISTWIDTH] IN BLOOD BY AUTOMATED COUNT: 14 % (ref 11.5–14.5)
GLOBULIN SER-MCNC: 3.9 G/DL (ref 2–4)
GLUCOSE SERPL-MCNC: 395 MG/DL (ref 74–106)
GLUCOSE SERPL-MCNC: 447 MG/DL (ref 70–105)
HCT VFR BLD AUTO: 37.3 % (ref 38–47)
HGB BLD-MCNC: 11.8 G/DL (ref 12–16)
IMM GRANULOCYTES # BLD AUTO: 0.02 K/UL (ref 0–0.04)
IMM GRANULOCYTES NFR BLD: 0.3 % (ref 0–0.4)
LYMPHOCYTES # BLD AUTO: 1.35 K/UL (ref 1–4.8)
LYMPHOCYTES NFR BLD AUTO: 20.1 % (ref 27–41)
MCH RBC QN AUTO: 31.1 PG (ref 27–31)
MCHC RBC AUTO-ENTMCNC: 31.6 G/DL (ref 32–36)
MCV RBC AUTO: 98.4 FL (ref 80–96)
MONOCYTES # BLD AUTO: 0.53 K/UL (ref 0–0.8)
MONOCYTES NFR BLD AUTO: 7.9 % (ref 2–6)
MPC BLD CALC-MCNC: 10.1 FL (ref 9.4–12.4)
NEUTROPHILS # BLD AUTO: 4.43 K/UL (ref 1.8–7.7)
NEUTROPHILS NFR BLD AUTO: 66.2 % (ref 53–65)
NRBC # BLD AUTO: 0 X10E3/UL
NRBC, AUTO (.00): 0 %
NT-PROBNP SERPL-MCNC: 3137 PG/ML (ref 1–125)
PLATELET # BLD AUTO: 245 K/UL (ref 150–400)
POTASSIUM SERPL-SCNC: 2.7 MMOL/L (ref 3.5–5.1)
PROT SERPL-MCNC: 6.7 G/DL (ref 6.4–8.2)
RBC # BLD AUTO: 3.79 M/UL (ref 4.2–5.4)
SODIUM SERPL-SCNC: 133 MMOL/L (ref 136–145)
TROPONIN I SERPL DL<=0.01 NG/ML-MCNC: 29.2 PG/ML
WBC # BLD AUTO: 6.7 K/UL (ref 4.5–11)

## 2024-07-08 PROCEDURE — 96375 TX/PRO/DX INJ NEW DRUG ADDON: CPT

## 2024-07-08 PROCEDURE — 93010 ELECTROCARDIOGRAM REPORT: CPT | Mod: ,,, | Performed by: INTERNAL MEDICINE

## 2024-07-08 PROCEDURE — 93005 ELECTROCARDIOGRAM TRACING: CPT

## 2024-07-08 PROCEDURE — 82962 GLUCOSE BLOOD TEST: CPT

## 2024-07-08 PROCEDURE — 83880 ASSAY OF NATRIURETIC PEPTIDE: CPT | Performed by: EMERGENCY MEDICINE

## 2024-07-08 PROCEDURE — 80053 COMPREHEN METABOLIC PANEL: CPT | Performed by: EMERGENCY MEDICINE

## 2024-07-08 PROCEDURE — 36415 COLL VENOUS BLD VENIPUNCTURE: CPT | Performed by: EMERGENCY MEDICINE

## 2024-07-08 PROCEDURE — 84484 ASSAY OF TROPONIN QUANT: CPT | Performed by: EMERGENCY MEDICINE

## 2024-07-08 PROCEDURE — 25000003 PHARM REV CODE 250: Performed by: EMERGENCY MEDICINE

## 2024-07-08 PROCEDURE — 25000242 PHARM REV CODE 250 ALT 637 W/ HCPCS: Performed by: EMERGENCY MEDICINE

## 2024-07-08 PROCEDURE — 85025 COMPLETE CBC W/AUTO DIFF WBC: CPT | Performed by: EMERGENCY MEDICINE

## 2024-07-08 PROCEDURE — 63600175 PHARM REV CODE 636 W HCPCS: Performed by: EMERGENCY MEDICINE

## 2024-07-08 PROCEDURE — 99285 EMERGENCY DEPT VISIT HI MDM: CPT | Mod: 25

## 2024-07-08 PROCEDURE — 96374 THER/PROPH/DIAG INJ IV PUSH: CPT

## 2024-07-08 RX ORDER — METHYLPREDNISOLONE SOD SUCC 125 MG
125 VIAL (EA) INJECTION
Status: COMPLETED | OUTPATIENT
Start: 2024-07-08 | End: 2024-07-08

## 2024-07-08 RX ORDER — FUROSEMIDE 10 MG/ML
40 INJECTION INTRAMUSCULAR; INTRAVENOUS
Status: COMPLETED | OUTPATIENT
Start: 2024-07-08 | End: 2024-07-08

## 2024-07-08 RX ORDER — POTASSIUM CHLORIDE 20 MEQ/1
40 TABLET, EXTENDED RELEASE ORAL
Status: COMPLETED | OUTPATIENT
Start: 2024-07-08 | End: 2024-07-08

## 2024-07-08 RX ORDER — IPRATROPIUM BROMIDE AND ALBUTEROL SULFATE 2.5; .5 MG/3ML; MG/3ML
9 SOLUTION RESPIRATORY (INHALATION)
Status: COMPLETED | OUTPATIENT
Start: 2024-07-08 | End: 2024-07-08

## 2024-07-08 RX ORDER — GABAPENTIN 100 MG/1
CAPSULE ORAL
Status: CANCELLED | OUTPATIENT
Start: 2024-07-08

## 2024-07-08 RX ORDER — MIDAZOLAM HYDROCHLORIDE 2 MG/2ML
2 INJECTION, SOLUTION INTRAMUSCULAR; INTRAVENOUS
Status: COMPLETED | OUTPATIENT
Start: 2024-07-08 | End: 2024-07-08

## 2024-07-08 RX ORDER — HEPARIN SODIUM 5000 [USP'U]/ML
5000 INJECTION, SOLUTION INTRAVENOUS; SUBCUTANEOUS EVERY 8 HOURS
Status: CANCELLED | OUTPATIENT
Start: 2024-07-09

## 2024-07-08 RX ORDER — CITALOPRAM 10 MG/1
40 TABLET ORAL DAILY
Status: CANCELLED | OUTPATIENT
Start: 2024-07-09

## 2024-07-08 RX ADMIN — FUROSEMIDE 40 MG: 10 INJECTION, SOLUTION INTRAMUSCULAR; INTRAVENOUS at 09:07

## 2024-07-08 RX ADMIN — MIDAZOLAM HYDROCHLORIDE 2 MG: 1 INJECTION, SOLUTION INTRAMUSCULAR; INTRAVENOUS at 09:07

## 2024-07-08 RX ADMIN — METHYLPREDNISOLONE SODIUM SUCCINATE 125 MG: 125 INJECTION, POWDER, FOR SOLUTION INTRAMUSCULAR; INTRAVENOUS at 08:07

## 2024-07-08 RX ADMIN — POTASSIUM CHLORIDE 40 MEQ: 1500 TABLET, EXTENDED RELEASE ORAL at 10:07

## 2024-07-08 RX ADMIN — IPRATROPIUM BROMIDE AND ALBUTEROL SULFATE 9 ML: .5; 3 SOLUTION RESPIRATORY (INHALATION) at 08:07

## 2024-07-09 PROBLEM — I50.9 CHF (CONGESTIVE HEART FAILURE): Status: ACTIVE | Noted: 2024-07-09

## 2024-07-09 PROBLEM — E11.9 TYPE 2 DIABETES MELLITUS, WITH LONG-TERM CURRENT USE OF INSULIN: Status: ACTIVE | Noted: 2024-03-12

## 2024-07-09 PROBLEM — F10.10 ALCOHOL ABUSE: Status: ACTIVE | Noted: 2024-07-09

## 2024-07-09 PROBLEM — I50.9 CHF (CONGESTIVE HEART FAILURE): Status: RESOLVED | Noted: 2024-07-09 | Resolved: 2024-07-09

## 2024-07-09 PROBLEM — J96.01 ACUTE RESPIRATORY FAILURE WITH HYPOXIA: Status: ACTIVE | Noted: 2024-07-09

## 2024-07-09 PROBLEM — J44.1 ACUTE EXACERBATION OF CHRONIC OBSTRUCTIVE PULMONARY DISEASE (COPD): Status: ACTIVE | Noted: 2024-07-09

## 2024-07-09 PROBLEM — I25.10 CAD (CORONARY ARTERY DISEASE): Status: ACTIVE | Noted: 2024-07-09

## 2024-07-09 PROBLEM — Z79.4 TYPE 2 DIABETES MELLITUS, WITH LONG-TERM CURRENT USE OF INSULIN: Status: ACTIVE | Noted: 2024-03-12

## 2024-07-09 PROBLEM — R07.9 CHEST PAIN: Status: ACTIVE | Noted: 2024-07-09

## 2024-07-09 PROBLEM — D68.51 FACTOR 5 LEIDEN MUTATION, HETEROZYGOUS: Status: ACTIVE | Noted: 2024-07-09

## 2024-07-09 PROBLEM — E87.6 HYPOKALEMIA: Status: ACTIVE | Noted: 2024-07-09

## 2024-07-09 PROBLEM — I50.23 ACUTE ON CHRONIC HFREF (HEART FAILURE WITH REDUCED EJECTION FRACTION): Status: ACTIVE | Noted: 2024-07-09

## 2024-07-09 PROBLEM — I10 HYPERTENSION: Status: ACTIVE | Noted: 2024-03-12

## 2024-07-09 LAB
ANION GAP SERPL CALCULATED.3IONS-SCNC: 12 MMOL/L (ref 7–16)
BASOPHILS # BLD AUTO: 0.02 K/UL (ref 0–0.2)
BASOPHILS NFR BLD AUTO: 0.6 % (ref 0–1)
BUN SERPL-MCNC: 16 MG/DL (ref 7–18)
BUN/CREAT SERPL: 12 (ref 6–20)
CALCIUM SERPL-MCNC: 8.1 MG/DL (ref 8.5–10.1)
CHLORIDE SERPL-SCNC: 96 MMOL/L (ref 98–107)
CO2 SERPL-SCNC: 31 MMOL/L (ref 21–32)
CREAT SERPL-MCNC: 1.32 MG/DL (ref 0.55–1.02)
DIFFERENTIAL METHOD BLD: ABNORMAL
EGFR (NO RACE VARIABLE) (RUSH/TITUS): 46 ML/MIN/1.73M2
EOSINOPHIL # BLD AUTO: 0.01 K/UL (ref 0–0.5)
EOSINOPHIL NFR BLD AUTO: 0.3 % (ref 1–4)
ERYTHROCYTE [DISTWIDTH] IN BLOOD BY AUTOMATED COUNT: 13.6 % (ref 11.5–14.5)
EST. AVERAGE GLUCOSE BLD GHB EST-MCNC: 240 MG/DL
GLUCOSE SERPL-MCNC: 429 MG/DL (ref 70–105)
GLUCOSE SERPL-MCNC: 451 MG/DL (ref 70–105)
GLUCOSE SERPL-MCNC: 467 MG/DL (ref 70–105)
GLUCOSE SERPL-MCNC: 473 MG/DL (ref 74–106)
GLUCOSE SERPL-MCNC: 509 MG/DL (ref 70–105)
HBA1C MFR BLD HPLC: 10 % (ref 4.5–6.6)
HCT VFR BLD AUTO: 37.4 % (ref 38–47)
HGB BLD-MCNC: 12 G/DL (ref 12–16)
IMM GRANULOCYTES # BLD AUTO: 0.01 K/UL (ref 0–0.04)
IMM GRANULOCYTES NFR BLD: 0.3 % (ref 0–0.4)
LYMPHOCYTES # BLD AUTO: 0.29 K/UL (ref 1–4.8)
LYMPHOCYTES NFR BLD AUTO: 8.9 % (ref 27–41)
MCH RBC QN AUTO: 30.6 PG (ref 27–31)
MCHC RBC AUTO-ENTMCNC: 32.1 G/DL (ref 32–36)
MCV RBC AUTO: 95.4 FL (ref 80–96)
MONOCYTES # BLD AUTO: 0.06 K/UL (ref 0–0.8)
MONOCYTES NFR BLD AUTO: 1.8 % (ref 2–6)
MPC BLD CALC-MCNC: 9.8 FL (ref 9.4–12.4)
NEUTROPHILS # BLD AUTO: 2.87 K/UL (ref 1.8–7.7)
NEUTROPHILS NFR BLD AUTO: 88.1 % (ref 53–65)
NRBC # BLD AUTO: 0 X10E3/UL
NRBC, AUTO (.00): 0 %
OHS QRS DURATION: 100 MS
OHS QTC CALCULATION: 451 MS
PLATELET # BLD AUTO: 230 K/UL (ref 150–400)
POTASSIUM SERPL-SCNC: 3.4 MMOL/L (ref 3.5–5.1)
RBC # BLD AUTO: 3.92 M/UL (ref 4.2–5.4)
SARS-COV-2 RDRP RESP QL NAA+PROBE: NEGATIVE
SODIUM SERPL-SCNC: 136 MMOL/L (ref 136–145)
TROPONIN I SERPL DL<=0.01 NG/ML-MCNC: 21.8 PG/ML
WBC # BLD AUTO: 3.26 K/UL (ref 4.5–11)

## 2024-07-09 PROCEDURE — 63600175 PHARM REV CODE 636 W HCPCS: Performed by: GENERAL PRACTICE

## 2024-07-09 PROCEDURE — 83036 HEMOGLOBIN GLYCOSYLATED A1C: CPT | Performed by: GENERAL PRACTICE

## 2024-07-09 PROCEDURE — 84484 ASSAY OF TROPONIN QUANT: CPT | Performed by: GENERAL PRACTICE

## 2024-07-09 PROCEDURE — 25000242 PHARM REV CODE 250 ALT 637 W/ HCPCS: Performed by: GENERAL PRACTICE

## 2024-07-09 PROCEDURE — 82962 GLUCOSE BLOOD TEST: CPT

## 2024-07-09 PROCEDURE — 63600175 PHARM REV CODE 636 W HCPCS: Performed by: INTERNAL MEDICINE

## 2024-07-09 PROCEDURE — 80048 BASIC METABOLIC PNL TOTAL CA: CPT | Performed by: GENERAL PRACTICE

## 2024-07-09 PROCEDURE — 27000221 HC OXYGEN, UP TO 24 HOURS

## 2024-07-09 PROCEDURE — 85025 COMPLETE CBC W/AUTO DIFF WBC: CPT | Performed by: GENERAL PRACTICE

## 2024-07-09 PROCEDURE — S4991 NICOTINE PATCH NONLEGEND: HCPCS | Performed by: GENERAL PRACTICE

## 2024-07-09 PROCEDURE — 94761 N-INVAS EAR/PLS OXIMETRY MLT: CPT

## 2024-07-09 PROCEDURE — 99900035 HC TECH TIME PER 15 MIN (STAT)

## 2024-07-09 PROCEDURE — 94640 AIRWAY INHALATION TREATMENT: CPT

## 2024-07-09 PROCEDURE — 25000003 PHARM REV CODE 250: Performed by: HOSPITALIST

## 2024-07-09 PROCEDURE — 36415 COLL VENOUS BLD VENIPUNCTURE: CPT | Performed by: GENERAL PRACTICE

## 2024-07-09 PROCEDURE — 25000003 PHARM REV CODE 250: Performed by: GENERAL PRACTICE

## 2024-07-09 PROCEDURE — 25000003 PHARM REV CODE 250: Performed by: INTERNAL MEDICINE

## 2024-07-09 PROCEDURE — 87635 SARS-COV-2 COVID-19 AMP PRB: CPT | Performed by: GENERAL PRACTICE

## 2024-07-09 PROCEDURE — 11000001 HC ACUTE MED/SURG PRIVATE ROOM

## 2024-07-09 RX ORDER — IBUPROFEN 200 MG
1 TABLET ORAL DAILY
Status: DISCONTINUED | OUTPATIENT
Start: 2024-07-09 | End: 2024-07-12 | Stop reason: HOSPADM

## 2024-07-09 RX ORDER — NALOXONE HCL 0.4 MG/ML
0.02 VIAL (ML) INJECTION
Status: DISCONTINUED | OUTPATIENT
Start: 2024-07-09 | End: 2024-07-12 | Stop reason: HOSPADM

## 2024-07-09 RX ORDER — INSULIN GLARGINE 100 [IU]/ML
20 INJECTION, SOLUTION SUBCUTANEOUS 2 TIMES DAILY
Status: DISCONTINUED | OUTPATIENT
Start: 2024-07-09 | End: 2024-07-10

## 2024-07-09 RX ORDER — CLOPIDOGREL BISULFATE 75 MG/1
75 TABLET ORAL DAILY
Status: DISCONTINUED | OUTPATIENT
Start: 2024-07-09 | End: 2024-07-12 | Stop reason: HOSPADM

## 2024-07-09 RX ORDER — POLYETHYLENE GLYCOL 3350 17 G/17G
17 POWDER, FOR SOLUTION ORAL 2 TIMES DAILY PRN
Status: DISCONTINUED | OUTPATIENT
Start: 2024-07-09 | End: 2024-07-12 | Stop reason: HOSPADM

## 2024-07-09 RX ORDER — INSULIN ASPART 100 [IU]/ML
0-5 INJECTION, SOLUTION INTRAVENOUS; SUBCUTANEOUS
Status: DISCONTINUED | OUTPATIENT
Start: 2024-07-09 | End: 2024-07-12 | Stop reason: HOSPADM

## 2024-07-09 RX ORDER — IPRATROPIUM BROMIDE AND ALBUTEROL SULFATE 2.5; .5 MG/3ML; MG/3ML
3 SOLUTION RESPIRATORY (INHALATION)
Status: DISCONTINUED | OUTPATIENT
Start: 2024-07-09 | End: 2024-07-12 | Stop reason: HOSPADM

## 2024-07-09 RX ORDER — FOLIC ACID 1 MG/1
1 TABLET ORAL DAILY
Status: DISCONTINUED | OUTPATIENT
Start: 2024-07-09 | End: 2024-07-12 | Stop reason: HOSPADM

## 2024-07-09 RX ORDER — ATORVASTATIN CALCIUM 80 MG/1
80 TABLET, FILM COATED ORAL DAILY
Status: DISCONTINUED | OUTPATIENT
Start: 2024-07-09 | End: 2024-07-12 | Stop reason: HOSPADM

## 2024-07-09 RX ORDER — CITALOPRAM 20 MG/1
40 TABLET, FILM COATED ORAL DAILY
Status: DISCONTINUED | OUTPATIENT
Start: 2024-07-09 | End: 2024-07-12 | Stop reason: HOSPADM

## 2024-07-09 RX ORDER — FUROSEMIDE 10 MG/ML
40 INJECTION INTRAMUSCULAR; INTRAVENOUS EVERY 12 HOURS
Status: DISCONTINUED | OUTPATIENT
Start: 2024-07-09 | End: 2024-07-09

## 2024-07-09 RX ORDER — SODIUM CHLORIDE 0.9 % (FLUSH) 0.9 %
10 SYRINGE (ML) INJECTION EVERY 12 HOURS PRN
Status: DISCONTINUED | OUTPATIENT
Start: 2024-07-09 | End: 2024-07-12 | Stop reason: HOSPADM

## 2024-07-09 RX ORDER — SIMETHICONE 80 MG
1 TABLET,CHEWABLE ORAL 4 TIMES DAILY PRN
Status: DISCONTINUED | OUTPATIENT
Start: 2024-07-09 | End: 2024-07-12 | Stop reason: HOSPADM

## 2024-07-09 RX ORDER — LEVOFLOXACIN 5 MG/ML
750 INJECTION, SOLUTION INTRAVENOUS
Status: DISCONTINUED | OUTPATIENT
Start: 2024-07-09 | End: 2024-07-11

## 2024-07-09 RX ORDER — METOPROLOL SUCCINATE 25 MG/1
25 TABLET, EXTENDED RELEASE ORAL 2 TIMES DAILY
Status: DISCONTINUED | OUTPATIENT
Start: 2024-07-09 | End: 2024-07-12 | Stop reason: HOSPADM

## 2024-07-09 RX ORDER — LORAZEPAM 1 MG/1
2 TABLET ORAL EVERY 4 HOURS PRN
Status: DISCONTINUED | OUTPATIENT
Start: 2024-07-09 | End: 2024-07-12 | Stop reason: HOSPADM

## 2024-07-09 RX ORDER — TRAZODONE HYDROCHLORIDE 50 MG/1
100 TABLET ORAL NIGHTLY PRN
Status: DISCONTINUED | OUTPATIENT
Start: 2024-07-09 | End: 2024-07-12 | Stop reason: HOSPADM

## 2024-07-09 RX ORDER — FUROSEMIDE 10 MG/ML
40 INJECTION INTRAMUSCULAR; INTRAVENOUS EVERY 12 HOURS
Status: COMPLETED | OUTPATIENT
Start: 2024-07-09 | End: 2024-07-10

## 2024-07-09 RX ORDER — INSULIN GLARGINE 100 [IU]/ML
10 INJECTION, SOLUTION SUBCUTANEOUS NIGHTLY
Status: DISCONTINUED | OUTPATIENT
Start: 2024-07-09 | End: 2024-07-09

## 2024-07-09 RX ORDER — THIAMINE HCL 100 MG
100 TABLET ORAL EVERY 8 HOURS
Status: DISCONTINUED | OUTPATIENT
Start: 2024-07-11 | End: 2024-07-12 | Stop reason: HOSPADM

## 2024-07-09 RX ORDER — IPRATROPIUM BROMIDE AND ALBUTEROL SULFATE 2.5; .5 MG/3ML; MG/3ML
3 SOLUTION RESPIRATORY (INHALATION)
Status: DISCONTINUED | OUTPATIENT
Start: 2024-07-09 | End: 2024-07-09

## 2024-07-09 RX ORDER — IBUPROFEN 200 MG
24 TABLET ORAL
Status: DISCONTINUED | OUTPATIENT
Start: 2024-07-09 | End: 2024-07-12 | Stop reason: HOSPADM

## 2024-07-09 RX ORDER — DOCUSATE SODIUM 100 MG/1
200 CAPSULE, LIQUID FILLED ORAL DAILY
COMMUNITY
End: 2024-07-19 | Stop reason: SDUPTHER

## 2024-07-09 RX ORDER — TALC
6 POWDER (GRAM) TOPICAL NIGHTLY PRN
Status: DISCONTINUED | OUTPATIENT
Start: 2024-07-09 | End: 2024-07-09

## 2024-07-09 RX ORDER — ACETAMINOPHEN 325 MG/1
650 TABLET ORAL EVERY 4 HOURS PRN
Status: DISCONTINUED | OUTPATIENT
Start: 2024-07-09 | End: 2024-07-12 | Stop reason: HOSPADM

## 2024-07-09 RX ORDER — CYCLOBENZAPRINE HCL 10 MG
10 TABLET ORAL EVERY 8 HOURS PRN
Status: DISCONTINUED | OUTPATIENT
Start: 2024-07-09 | End: 2024-07-12 | Stop reason: HOSPADM

## 2024-07-09 RX ORDER — GLUCAGON 1 MG
1 KIT INJECTION
Status: DISCONTINUED | OUTPATIENT
Start: 2024-07-09 | End: 2024-07-12 | Stop reason: HOSPADM

## 2024-07-09 RX ORDER — IBUPROFEN 200 MG
16 TABLET ORAL
Status: DISCONTINUED | OUTPATIENT
Start: 2024-07-09 | End: 2024-07-12 | Stop reason: HOSPADM

## 2024-07-09 RX ORDER — GABAPENTIN 400 MG/1
800 CAPSULE ORAL NIGHTLY
Status: DISCONTINUED | OUTPATIENT
Start: 2024-07-09 | End: 2024-07-12 | Stop reason: HOSPADM

## 2024-07-09 RX ORDER — ONDANSETRON HYDROCHLORIDE 2 MG/ML
4 INJECTION, SOLUTION INTRAVENOUS EVERY 8 HOURS PRN
Status: DISCONTINUED | OUTPATIENT
Start: 2024-07-09 | End: 2024-07-12 | Stop reason: HOSPADM

## 2024-07-09 RX ORDER — POTASSIUM CHLORIDE 20 MEQ/1
40 TABLET, EXTENDED RELEASE ORAL ONCE
Status: COMPLETED | OUTPATIENT
Start: 2024-07-09 | End: 2024-07-09

## 2024-07-09 RX ORDER — EZETIMIBE 10 MG/1
10 TABLET ORAL DAILY
Status: DISCONTINUED | OUTPATIENT
Start: 2024-07-09 | End: 2024-07-12 | Stop reason: HOSPADM

## 2024-07-09 RX ORDER — TRAZODONE HYDROCHLORIDE 50 MG/1
100 TABLET ORAL NIGHTLY
Status: DISCONTINUED | OUTPATIENT
Start: 2024-07-09 | End: 2024-07-09

## 2024-07-09 RX ADMIN — LORAZEPAM 2 MG: 1 TABLET ORAL at 11:07

## 2024-07-09 RX ADMIN — INSULIN ASPART 5 UNITS: 100 INJECTION, SOLUTION INTRAVENOUS; SUBCUTANEOUS at 10:07

## 2024-07-09 RX ADMIN — POTASSIUM CHLORIDE 40 MEQ: 1500 TABLET, EXTENDED RELEASE ORAL at 01:07

## 2024-07-09 RX ADMIN — POTASSIUM BICARBONATE 50 MEQ: 977.5 TABLET, EFFERVESCENT ORAL at 09:07

## 2024-07-09 RX ADMIN — INSULIN GLARGINE 10 UNITS: 100 INJECTION, SOLUTION SUBCUTANEOUS at 12:07

## 2024-07-09 RX ADMIN — METOPROLOL SUCCINATE 25 MG: 25 TABLET, EXTENDED RELEASE ORAL at 09:07

## 2024-07-09 RX ADMIN — METHYLPREDNISOLONE SODIUM SUCCINATE 60 MG: 40 INJECTION, POWDER, FOR SOLUTION INTRAMUSCULAR; INTRAVENOUS at 12:07

## 2024-07-09 RX ADMIN — INSULIN GLARGINE 20 UNITS: 100 INJECTION, SOLUTION SUBCUTANEOUS at 09:07

## 2024-07-09 RX ADMIN — ACETAMINOPHEN 650 MG: 325 TABLET ORAL at 09:07

## 2024-07-09 RX ADMIN — FUROSEMIDE 40 MG: 10 INJECTION, SOLUTION INTRAMUSCULAR; INTRAVENOUS at 09:07

## 2024-07-09 RX ADMIN — IPRATROPIUM BROMIDE AND ALBUTEROL SULFATE 3 ML: 2.5; .5 SOLUTION RESPIRATORY (INHALATION) at 07:07

## 2024-07-09 RX ADMIN — SACUBITRIL AND VALSARTAN 1 TABLET: 24; 26 TABLET, FILM COATED ORAL at 09:07

## 2024-07-09 RX ADMIN — EZETIMIBE 10 MG: 10 TABLET ORAL at 09:07

## 2024-07-09 RX ADMIN — THIAMINE HYDROCHLORIDE 500 MG: 100 INJECTION, SOLUTION INTRAMUSCULAR; INTRAVENOUS at 10:07

## 2024-07-09 RX ADMIN — IPRATROPIUM BROMIDE AND ALBUTEROL SULFATE 3 ML: 2.5; .5 SOLUTION RESPIRATORY (INHALATION) at 12:07

## 2024-07-09 RX ADMIN — RIVAROXABAN 20 MG: 20 TABLET, FILM COATED ORAL at 06:07

## 2024-07-09 RX ADMIN — TRAZODONE HYDROCHLORIDE 100 MG: 50 TABLET ORAL at 09:07

## 2024-07-09 RX ADMIN — INSULIN ASPART 3 UNITS: 100 INJECTION, SOLUTION INTRAVENOUS; SUBCUTANEOUS at 12:07

## 2024-07-09 RX ADMIN — SACUBITRIL AND VALSARTAN 1 TABLET: 24; 26 TABLET, FILM COATED ORAL at 12:07

## 2024-07-09 RX ADMIN — GABAPENTIN 800 MG: 400 CAPSULE ORAL at 12:07

## 2024-07-09 RX ADMIN — NICOTINE 1 PATCH: 14 PATCH, EXTENDED RELEASE TRANSDERMAL at 09:07

## 2024-07-09 RX ADMIN — METHYLPREDNISOLONE SODIUM SUCCINATE 60 MG: 40 INJECTION, POWDER, FOR SOLUTION INTRAMUSCULAR; INTRAVENOUS at 11:07

## 2024-07-09 RX ADMIN — INSULIN ASPART 5 UNITS: 100 INJECTION, SOLUTION INTRAVENOUS; SUBCUTANEOUS at 02:07

## 2024-07-09 RX ADMIN — THERA TABS 1 TABLET: TAB at 09:07

## 2024-07-09 RX ADMIN — CYCLOBENZAPRINE 10 MG: 10 TABLET, FILM COATED ORAL at 09:07

## 2024-07-09 RX ADMIN — METHYLPREDNISOLONE SODIUM SUCCINATE 60 MG: 40 INJECTION, POWDER, FOR SOLUTION INTRAMUSCULAR; INTRAVENOUS at 06:07

## 2024-07-09 RX ADMIN — FOLIC ACID 1 MG: 1 TABLET ORAL at 09:07

## 2024-07-09 RX ADMIN — ATORVASTATIN CALCIUM 80 MG: 80 TABLET, FILM COATED ORAL at 09:07

## 2024-07-09 RX ADMIN — IPRATROPIUM BROMIDE AND ALBUTEROL SULFATE 3 ML: 2.5; .5 SOLUTION RESPIRATORY (INHALATION) at 01:07

## 2024-07-09 RX ADMIN — GABAPENTIN 800 MG: 400 CAPSULE ORAL at 09:07

## 2024-07-09 RX ADMIN — TRAZODONE HYDROCHLORIDE 100 MG: 50 TABLET ORAL at 12:07

## 2024-07-09 RX ADMIN — METHYLPREDNISOLONE SODIUM SUCCINATE 60 MG: 40 INJECTION, POWDER, FOR SOLUTION INTRAMUSCULAR; INTRAVENOUS at 05:07

## 2024-07-09 RX ADMIN — LORAZEPAM 2 MG: 1 TABLET ORAL at 06:07

## 2024-07-09 RX ADMIN — INSULIN ASPART 5 UNITS: 100 INJECTION, SOLUTION INTRAVENOUS; SUBCUTANEOUS at 06:07

## 2024-07-09 RX ADMIN — METOPROLOL SUCCINATE 25 MG: 25 TABLET, EXTENDED RELEASE ORAL at 12:07

## 2024-07-09 RX ADMIN — THIAMINE HYDROCHLORIDE 500 MG: 100 INJECTION, SOLUTION INTRAMUSCULAR; INTRAVENOUS at 05:07

## 2024-07-09 RX ADMIN — FUROSEMIDE 40 MG: 10 INJECTION, SOLUTION INTRAMUSCULAR; INTRAVENOUS at 01:07

## 2024-07-09 RX ADMIN — THIAMINE HYDROCHLORIDE 500 MG: 100 INJECTION, SOLUTION INTRAMUSCULAR; INTRAVENOUS at 02:07

## 2024-07-09 RX ADMIN — LEVOFLOXACIN 750 MG: 750 INJECTION, SOLUTION INTRAVENOUS at 12:07

## 2024-07-09 RX ADMIN — INSULIN ASPART 3 UNITS: 100 INJECTION, SOLUTION INTRAVENOUS; SUBCUTANEOUS at 09:07

## 2024-07-09 RX ADMIN — CITALOPRAM HYDROBROMIDE 40 MG: 20 TABLET ORAL at 09:07

## 2024-07-09 RX ADMIN — LORAZEPAM 2 MG: 1 TABLET ORAL at 12:07

## 2024-07-09 RX ADMIN — CLOPIDOGREL BISULFATE 75 MG: 75 TABLET ORAL at 09:07

## 2024-07-09 NOTE — PHARMACY MED REC
"Admission Medication History     The home medication history was taken by Di Bhat.    You may go to "Admission" then "Reconcile Home Medications" tabs to review and/or act upon these items.     The home medication list has been updated by the Pharmacy department.   Please read ALL comments highlighted in yellow.   Please address this information as you see fit.    Feel free to contact us if you have any questions or require assistance.    The following medications were added:  Colace 100 mg      The medications listed below were removed from the home medication list. Please reorder if appropriate:  Patient reports no longer taking the following medication(s):  Alprazolam 0.25 mg  Medrol dosepack  Ozempic 2mg/3 mL  Ondansetron 4 mg    Medications listed below were obtained from: Patient/family, Analytic software- WIB, and Medical records  (Not in a hospital admission)        Current Outpatient Medications on File Prior to Encounter   Medication Sig Dispense Refill Last Dose    albuterol (VENTOLIN HFA) 90 mcg/actuation inhaler Inhale 2 puffs into the lungs every 6 (six) hours as needed for Wheezing. Rescue 18 g 2 7/8/2024    citalopram (CELEXA) 40 MG tablet Take 1 tablet (40 mg total) by mouth once daily. 90 tablet 2 7/8/2024    clopidogreL (PLAVIX) 75 mg tablet Take 1 tablet (75 mg total) by mouth once daily. 90 tablet 2 7/8/2024    cyclobenzaprine (FLEXERIL) 10 MG tablet Take 1 tablet (10 mg total) by mouth 2 (two) times daily as needed for Muscle spasms. 60 tablet 2 7/8/2024    dapagliflozin propanediol (FARXIGA) 5 mg Tab tablet Take 1 tablet (5 mg total) by mouth once daily. 90 tablet 1 7/8/2024    docusate sodium (COLACE) 100 MG capsule Take 200 mg by mouth once daily.   7/8/2024    ezetimibe (ZETIA) 10 mg tablet Take 1 tablet (10 mg total) by mouth once daily. 90 tablet 2 7/8/2024    fluticasone-salmeterol diskus inhaler 250-50 mcg Inhale 1 puff into the lungs 2 (two) times daily. Controller 60 each " 11 7/8/2024    furosemide (LASIX) 40 MG tablet TAKE 1 TABLET FOUR TIMES DAILY (Patient taking differently: Take 40 mg by mouth 4 (four) times daily as needed. TAKE 1 TABLET UP TO FOUR TIMES DAILY) 360 tablet 3 7/8/2024    gabapentin (NEURONTIN) 800 MG tablet Take 1 tablet (800 mg total) by mouth 4 (four) times daily. 120 tablet 2 7/8/2024    insulin aspart U-100 (NOVOLOG FLEXPEN U-100 INSULIN) 100 unit/mL (3 mL) InPn pen Inject 5-10 minutes before meals per sliding scale 2 each 2 7/8/2024    insulin degludec (TRESIBA FLEXTOUCH U-100) 100 unit/mL (3 mL) insulin pen Inject 20 Units into the skin every evening. 6 mL 11 7/7/2024    metoprolol succinate (TOPROL-XL) 25 MG 24 hr tablet Take 1 tablet (25 mg total) by mouth 2 (two) times daily. 180 tablet 2 7/8/2024    rivaroxaban (XARELTO) 20 mg Tab Take 1 tablet (20 mg total) by mouth daily with dinner or evening meal. 90 tablet 2 7/8/2024    rosuvastatin (CRESTOR) 20 MG tablet Take 1 tablet (20 mg total) by mouth once daily. 90 tablet 1 7/7/2024    sacubitriL-valsartan (ENTRESTO) 24-26 mg per tablet Take 1 tablet by mouth 2 (two) times daily. 180 tablet 1 7/8/2024    blood sugar diagnostic (ACCU-CHEK GUIDE TEST STRIPS) Strp 1 strip by Misc.(Non-Drug; Combo Route) route 3 (three) times daily. 100 strip 11     nicotine (NICODERM CQ) 7 mg/24 hr Place 1 patch onto the skin once daily. 14 patch 6 Unknown    nicotine, polacrilex, (NICORETTE) 2 mg Gum Take 1 each (2 mg total) by mouth as needed. 100 each 0 Unknown    traZODone (DESYREL) 100 MG tablet Take 1 tablet (100 mg total) by mouth every evening. 90 tablet 2 7/7/2024    [DISCONTINUED] ALPRAZolam (XANAX) 0.25 MG tablet Take 1 tablet (0.25 mg total) by mouth 2 (two) times daily as needed for Anxiety. (Patient not taking: Reported on 2/9/2024) 60 tablet 0     [DISCONTINUED] methylPREDNISolone (MEDROL DOSEPACK) 4 mg tablet use as directed 21 tablet 0     [DISCONTINUED] ondansetron (ZOFRAN-ODT) 4 MG TbDL Take 2 tablets (8 mg  total) by mouth every 6 (six) hours as needed (nausea). 30 tablet 0     [DISCONTINUED] OZEMPIC 0.25 mg or 0.5 mg (2 mg/3 mL) pen injector Inject 0.5 mg into the skin every 7 days. 3 mL 2     [DISCONTINUED] semaglutide (OZEMPIC) 0.25 mg or 0.5 mg(2 mg/1.5 mL) pen injector Inject 0.5 mg into the skin every 7 days. 2 each 2        Potential issues to be addressed PRIOR TO DISCHARGE  N/A    Di Bhat  EXT [id number]                 .

## 2024-07-09 NOTE — ASSESSMENT & PLAN NOTE
Patient with known CAD s/p stent placement, which is controlled Will continue Plavix and Statin and monitor for S/Sx of angina/ACS. Continue to monitor on telemetry.

## 2024-07-09 NOTE — ED NOTES
Pt caught smoking cigarette in hospital room. Dr. Burns at bedside, explained to pt she cannot smoke in hospital. Pt verbalizes understatement. Cigarettes confiscated from pt.

## 2024-07-09 NOTE — HOSPITAL COURSE
7/9- No change in respiratory status, continue steroids, antibiotics, nebulization and IV diuresis. Remains on 3L NC.   7/10- Very slow improvement, reduce steroids frequency, continue IV diuresis, nebulization and antibiotics. On 2L NC now.   7/11- Required Ativan based on CIWA score overnight and this morning, monitor closely for DT's. Continue diuresis, steroids, antibiotics (transitioned to PO).   7/12- Improved status, less Ativan required now, counseled on completely quitting alcohol and tobacco, patient understands. Discharge home today on steroids, oral diuretics and close follow up with PCP and primary cardiologist. Will evaluate for home O2 prior to discharge.

## 2024-07-09 NOTE — ASSESSMENT & PLAN NOTE
Sec to combination of CHF and COPD exacerbation.   Requiring 3L NC.   Management as below.  Wean O2 as tolerated, home O2 eval closer to discharge.

## 2024-07-09 NOTE — HPI
Patient is a 57 y/o female with PMHx of COPD/Asthma, CAD, HFrEF (EF 40-45%), DM2, HTN, Factor 5 Leiden, DVT (on Xarelto) and depression who presents to the ED with complaint of SOB, productive cough, increase in abdominal girth and leg swelling that are getting worse for the past few days. Patient reports worsening SOB when laying flat. Patient also reports trying home inhalers and nebulizer earlier in the day with minimum relief. Patient also reports having no appetite for the past 4 days. She states that she had chest pain earlier in the day that lasted a few seconds while having episodes of cough. She is now chest pain free. Patient is known of cardiology, Dr. Diggs. She also sees pulmonology, Dr. Mcdaniels. Patient states that she is cutting down on cigarettes and has been smoking 4 cigarettes per day. She reports drinking half a pint of alcohol per day for the past 20 years and denies experiencing withdraw symptoms in the past. Patient denies any other drug use. Patient denies fever, chills, nausea, vomiting, bowel or urinary habit changes.    In the ED virtal signs showed /68, HR 88, RR 18, SpO2 93% on room air and afebrile. Blood work showed H/H 11.8/37.3, WBC 6.7, , sodium 133, potassium 2.7, BUN/Cr 15/1.3, glucose 395, p-BNP 3100 from 2700 five months ago, troponin 29.2. EKG NSR with PACs, no ST ischemic changes. Chest xray showing B/L pulmonary edema. Patient was given IV lasix, replenished potassium and breathing treatments. Patient will be admitted to the hospital for further management.

## 2024-07-09 NOTE — PLAN OF CARE
[facility] - Emergency Department  Initial Discharge Assessment       Primary Care Provider: Anabell Mendez NP    Admission Diagnosis: COPD with acute exacerbation [J44.1]    Admission Date: 7/8/2024  Expected Discharge Date:     Transition of Care Barriers: None    Payor: HU[employer] MANAGED MEDICARE / Plan: HU[employer] MEDICARE PPO / Product Type: Medicare Advantage /     No emergency contact information on file.    Discharge Plan A: Home  Discharge Plan B: Home, Home Health      [facility] [address] [place] [address] [address] [place] [address]  [address] [place] [id number]  Phone: [phone] Fax: [phone]    [facility] [address] [place] [address] [address] [address] [facility] [address]  Phone: [phone] Fax: [phone]    [facility] [address]  [address]  [facility] [address]  Phone: [phone] Fax: [phone]    [facility] Mail Delivery - [facility] [address] [facility]  [address] [facility] [address]  Phone: [phone] Fax: [phone]      Initial Assessment (most recent)       Adult Discharge Assessment - 07/09/24 1050          Discharge Assessment    Assessment Type Discharge Planning Assessment     Confirmed/corrected address, phone number and insurance Yes     Confirmed Demographics Correct on Facesheet     Source of Information patient     People in Home alone     Do you expect to return to your current living situation? Yes     Do you have help at home or someone to help you manage your care at home? Yes     Who are your caregiver(s) and their phone number(s)? Ayse Galeana, friend, [phone]     Prior to hospitilization cognitive status: Unable to Assess     Current cognitive status: Alert/Oriented     Walking or Climbing Stairs Difficulty no     Dressing/Bathing Difficulty no     Home Accessibility stairs to enter home     Number of Stairs, Main Entrance three     Stair Railings, Main Entrance  railings safe and in good condition;railings on both sides of stairs     Home Layout Able to live on 1st floor     Equipment Currently Used at Home rollator;nebulizer     Readmission within 30 days? No     Patient currently being followed by outpatient case management? No     Do you currently have service(s) that help you manage your care at home? No     Do you take prescription medications? Yes     Do you have prescription coverage? Yes     Coverage [employer] Medicare     Do you have any problems affording any of your prescribed medications? No     Is the patient taking medications as prescribed? yes     Who is going to help you get home at discharge? friend     How do you get to doctors appointments? car, drives self;family or friend will provide     Are you on dialysis? No     Do you take coumadin? No     Discharge Plan A Home     Discharge Plan B Home;Home Health     DME Needed Upon Discharge  none     Discharge Plan discussed with: Patient     Transition of Care Barriers None        Physical Activity    On average, how many days per week do you engage in moderate to strenuous exercise (like a brisk walk)? 0 days     On average, how many minutes do you engage in exercise at this level? 0 min        Financial Resource Strain    How hard is it for you to pay for the very basics like food, housing, medical care, and heating? Not hard at all        Housing Stability    In the last 12 months, was there a time when you were not able to pay the mortgage or rent on time? No     At any time in the past 12 months, were you homeless or living in a shelter (including now)? No        Transportation Needs    Has the lack of transportation kept you from medical appointments, meetings, work or from getting things needed for daily living? No        Food Insecurity    Within the past 12 months, you worried that your food would run out before you got the money to buy more. Never true     Within the past 12 months, the food you bought  just didn't last and you didn't have money to get more. Never true        Stress    Do you feel stress - tense, restless, nervous, or anxious, or unable to sleep at night because your mind is troubled all the time - these days? Not at all        Social Isolation    How often do you feel lonely or isolated from those around you?  Never        Alcohol Use    Q1: How often do you have a drink containing alcohol? 4 or more times a week     Q2: How many drinks containing alcohol do you have on a typical day when you are drinking? 3 or 4     Q3: How often do you have six or more drinks on one occasion? Never        Utilities    In the past 12 months has the electric, gas, oil, or water company threatened to shut off services in your home? No        Health Literacy    How often do you need to have someone help you when you read instructions, pamphlets, or other written material from your doctor or pharmacy? Rarely                   SS spoke with pt at bedside. Pt lives at home by herself, but her friend, Ayse, lives nearby and checks on her daily. Pt plans to return to current living arrangements when medically ready for discharge. Pt has required DME. Pt is not current with . SDOH completed. IM obtained. SS following for anticipated dc needs.

## 2024-07-09 NOTE — ASSESSMENT & PLAN NOTE
Continues to smoke cigarettes.  Patient's COPD is with exacerbation noted by continued dyspnea, use of accessory muscles for breathing, and worsening of baseline hypoxia currently.    Patient is currently on COPD Pathway.   Continue scheduled inhalers Steroids, Antibiotics, and Supplemental oxygen and monitor respiratory status closely.   Follows with Dr. Mcdaniels.

## 2024-07-09 NOTE — ED PROVIDER NOTES
Encounter Date: 7/8/2024    SCRIBE #1 NOTE: I, Gina Corral, am scribing for, and in the presence of,  Mahesh Burns MD. I have scribed the entire note.       History     Chief Complaint   Patient presents with    Shortness of Breath     Pov to er - c/o sob     The pt is a 59 y/o female coming into the ED with complaints of SOB. She states this is worsened with laying flat. Pt states she has a Hx of asthma and used her inhaler and nebulizer today with mild relief. She states it did not help as long as it usually does. Pt also complains of worsened leg swelling and no appetite for 4 days. She also complains of cough with some green phlegm production also onset 4 days. Pt denies nausea and vomiting but does mention CP earlier today that lasted for only a few seconds. She states she is down to smoking only 4 cigarettes a day and drinks half a pint of alcohol a day. She denies the use of drugs. There are no other complaints at this time.    The history is provided by the patient. No  was used.     Review of patient's allergies indicates:  No Known Allergies  Past Medical History:   Diagnosis Date    Depression     Diabetes mellitus, type 2     DVT (deep venous thrombosis)     Factor 5 Leiden mutation, heterozygous      History reviewed. No pertinent surgical history.  Family History   Problem Relation Name Age of Onset    Cancer Mother      Heart failure Father       Social History     Tobacco Use    Smoking status: Every Day     Average packs/day: 0.5 packs/day for 49.0 years (24.5 ttl pk-yrs)     Types: Cigarettes     Start date: 1975    Smokeless tobacco: Never    Tobacco comments:     Patient states 4 singles a day   Substance Use Topics    Alcohol use: Yes     Comment: occasionally    Drug use: Never     Review of Systems   Constitutional:  Positive for appetite change.   Respiratory:  Positive for cough and shortness of breath.    Cardiovascular:  Positive for chest pain and leg swelling.    Gastrointestinal:  Negative for nausea and vomiting.   All other systems reviewed and are negative.      Physical Exam     Initial Vitals [07/08/24 2034]   BP Pulse Resp Temp SpO2   137/68 88 18 98.3 °F (36.8 °C) (!) 93 %      MAP       --         Physical Exam    Nursing note and vitals reviewed.  Constitutional: She appears well-developed and well-nourished.   HENT:   Head: Normocephalic and atraumatic.   Right Ear: External ear normal.   Left Ear: External ear normal.   Nose: Nose normal.   Mouth/Throat: Oropharynx is clear and moist.   Eyes: Conjunctivae and EOM are normal. Pupils are equal, round, and reactive to light. No scleral icterus.   Neck: Neck supple. No JVD present.   Normal range of motion.  Cardiovascular:  Normal rate, regular rhythm, normal heart sounds and intact distal pulses.     Exam reveals no gallop and no friction rub.       No murmur heard.  Pulmonary/Chest: No stridor. No respiratory distress. She has wheezes. She exhibits no tenderness.   Abdominal: Abdomen is soft. Bowel sounds are normal. She exhibits no distension and no mass. There is no abdominal tenderness. There is no rebound and no guarding.   Musculoskeletal:         General: Edema (2 + pitting edema bilaterally) present. No tenderness. Normal range of motion.      Cervical back: Normal range of motion and neck supple.      Comments: Back is nontender to palpation.      Neurological: She is alert and oriented to person, place, and time. She has normal strength. No cranial nerve deficit.   Skin: Skin is warm and dry. Capillary refill takes less than 2 seconds. No rash noted. No pallor.   Psychiatric: She has a normal mood and affect. Thought content normal.         ED Course   Procedures  Labs Reviewed   COMPREHENSIVE METABOLIC PANEL - Abnormal; Notable for the following components:       Result Value    Sodium 133 (*)     Potassium 2.7 (*)     Chloride 94 (*)     Glucose 395 (*)     Creatinine 1.30 (*)     Calcium 8.2 (*)      Albumin 2.8 (*)     eGFR 47 (*)     All other components within normal limits   NT-PRO NATRIURETIC PEPTIDE - Abnormal; Notable for the following components:    ProBNP 3,137 (*)     All other components within normal limits   CBC WITH DIFFERENTIAL - Abnormal; Notable for the following components:    RBC 3.79 (*)     Hemoglobin 11.8 (*)     Hematocrit 37.3 (*)     MCV 98.4 (*)     MCH 31.1 (*)     MCHC 31.6 (*)     Neutrophils % 66.2 (*)     Lymphocytes % 20.1 (*)     Monocytes % 7.9 (*)     Eosinophils % 4.5 (*)     All other components within normal limits   POCT GLUCOSE MONITORING CONTINUOUS - Abnormal; Notable for the following components:    POC Glucose 447 (*)     All other components within normal limits   TROPONIN I - Normal   CBC W/ AUTO DIFFERENTIAL    Narrative:     The following orders were created for panel order CBC auto differential.  Procedure                               Abnormality         Status                     ---------                               -----------         ------                     CBC with Differential[[id number]]       Abnormal            Final result                 Please view results for these tests on the individual orders.          Imaging Results              X-Ray Chest AP Portable (Final result)  Result time 07/08/24 20:58:48      Final result by Ty Turner II, MD (07/08/24 20:58:48)                   Impression:      Findings suggest mild cardiac decompensation and / or pneumonitis.      Electronically signed by: Ty Turner  Date:    07/08/2024  Time:    20:58               Narrative:    EXAMINATION:  XR CHEST AP PORTABLE    CLINICAL HISTORY:  Dyspnea;    COMPARISON:  9 February 2024    TECHNIQUE:  XR CHEST AP PORTABLE    FINDINGS:  The heart and mediastinum are stable in size and configuration.  The pulmonary vascularity is slightly increased with bilateral increased interstitial lung density.  No other lung infiltrates, effusions, pneumothorax or other  abnormality is demonstrated.                                    X-Rays:   Independently Interpreted Readings:   Other Readings:  Xray Chest AP Portable:  Findings suggest mild cardiac decompensation and / or pneumonitis.    Medications   albuterol-ipratropium 2.5 mg-0.5 mg/3 mL nebulizer solution 9 mL (9 mLs Nebulization Given 7/8/24 2040)   methylPREDNISolone sodium succinate injection 125 mg (125 mg Intravenous Given 7/8/24 2041)   midazolam (PF) (VERSED) 1 mg/mL injection 2 mg (2 mg Intravenous Given 7/8/24 2118)   furosemide injection 40 mg (40 mg Intravenous Given 7/8/24 2125)   potassium chloride SA CR tablet 40 mEq (40 mEq Oral Given 7/8/24 2240)     Medical Decision Making  Amount and/or Complexity of Data Reviewed  Labs: ordered.  Radiology: ordered.    Risk  Prescription drug management.              Attending Attestation:           Physician Attestation for Scribe:  Physician Attestation Statement for Scribe #1: I, Mahesh Burns MD, reviewed documentation, as scribed by Gina Corral in my presence, and it is both accurate and complete.             ED Course as of 07/08/24 2246 Mon Jul 08, 2024 2035 Medical decision-making:  Differential diagnosis includes acute bronchitis, pneumonia, COPD exacerbation, CHF exacerbation, STEMI, NSTEMI.  All testing ordered and interpreted by me. [BB]   2103 Xray Chest AP Portable:  Findings suggest mild cardiac decompensation and / or pneumonitis.   [LP]   2232 Pro BNP is elevated at 3137 which is around baseline when compared to review of outside medical record.  Troponin is normal.  CMP shows hypokalemia with potassium of 2.7, hyperglycemia, mildly elevated creatinine which is around baseline.  CBC is unremarkable. [BB]   2239 On repeat exam wheezes are minimally improved.  Patient states symptoms are minimally improved.  Because of this I made decision to admit patient and discussed case with internal medicine hospitalist on-call who agrees with admission. [BB]       ED Course User Index  [BB] Mahesh Burns MD  [LP] Gina Corral                             Clinical Impression:  Final diagnoses:  [R06.02] Shortness of breath  [J44.1] COPD with acute exacerbation (Primary)  [E87.6] Hypokalemia                 Mahesh Burns MD  07/08/24 9858

## 2024-07-09 NOTE — ASSESSMENT & PLAN NOTE
Patient reports drinking half a pint of alcohol per day for the past 20 years. She denies experiencing withdraw symptoms in the past.  CIWA 0 on admission.   CIWA AR scale Q8H  Fall precautions  Multivitamins, folic acid and thiamine  Ativan PRN

## 2024-07-09 NOTE — H&P
[facility] - Emergency Department  [facility]  History & Physical    Patient Name: Kirk Villar  MRN: [id number]  Patient Class: IP- Inpatient  Admission Date: 7/8/2024  Attending Physician: Fredy Avilez MD   Primary Care Provider: Anabell Mendez NP         Patient information was obtained from patient, past medical records, and ER records.     Subjective:     Principal Problem:Acute exacerbation of chronic obstructive pulmonary disease (COPD)    Chief Complaint:   Chief Complaint   Patient presents with    Shortness of Breath     Pov to er - c/o sob        HPI: Patient is a 57 y/o female with PMHx of COPD/Asthma, CAD, HFrEF (EF 40-45%), DM2, HTN, Factor 5 Leiden, DVT (on Xarelto) and depression who presents to the ED with complaint of SOB, productive cough, increase in abdominal girth and leg swelling that are getting worse for the past few days. Patient reports worsening SOB when laying flat. Patient also reports trying home inhalers and nebulizer earlier in the day with minimum relief. Patient also reports having no appetite for the past 4 days. She states that she had chest pain earlier in the day that lasted a few seconds while having episodes of cough. She is now chest pain free. Patient is known of cardiology, Dr. Diggs. She also sees pulmonology, Dr. Mcdaniels. Patient states that she is cutting down on cigarettes and has been smoking 4 cigarettes per day. She reports drinking half a pint of alcohol per day for the past 20 years and denies experiencing withdraw symptoms in the past. Patient denies any other drug use. Patient denies fever, chills, nausea, vomiting, bowel or urinary habit changes.    In the ED virtal signs showed /68, HR 88, RR 18, SpO2 93% on room air and afebrile. Blood work showed H/H 11.8/37.3, WBC 6.7, , sodium 133, potassium 2.7, BUN/Cr 15/1.3, glucose 395, p-BNP 3100 from 2700 five months ago, troponin 29.2. EKG NSR with PACs, no ST ischemic changes. Chest  xray showing B/L pulmonary edema. Patient was given IV lasix, replenished potassium and breathing treatments. Patient will be admitted to the hospital for further management.    Past Medical History:   Diagnosis Date    Depression     Diabetes mellitus, type 2     DVT (deep venous thrombosis)     Factor 5 Leiden mutation, heterozygous        History reviewed. No pertinent surgical history.    Review of patient's allergies indicates:  No Known Allergies    No current facility-administered medications on file prior to encounter.     Current Outpatient Medications on File Prior to Encounter   Medication Sig    albuterol (VENTOLIN HFA) 90 mcg/actuation inhaler Inhale 2 puffs into the lungs every 6 (six) hours as needed for Wheezing. Rescue    ALPRAZolam (XANAX) 0.25 MG tablet Take 1 tablet (0.25 mg total) by mouth 2 (two) times daily as needed for Anxiety. (Patient not taking: Reported on 2/9/2024)    blood sugar diagnostic (ACCU-CHEK GUIDE TEST STRIPS) Strp 1 strip by Misc.(Non-Drug; Combo Route) route 3 (three) times daily.    citalopram (CELEXA) 40 MG tablet Take 1 tablet (40 mg total) by mouth once daily.    clopidogreL (PLAVIX) 75 mg tablet Take 1 tablet (75 mg total) by mouth once daily.    cyclobenzaprine (FLEXERIL) 10 MG tablet Take 1 tablet (10 mg total) by mouth 2 (two) times daily as needed for Muscle spasms.    dapagliflozin propanediol (FARXIGA) 5 mg Tab tablet Take 1 tablet (5 mg total) by mouth once daily.    ezetimibe (ZETIA) 10 mg tablet Take 1 tablet (10 mg total) by mouth once daily.    fluticasone-salmeterol diskus inhaler 250-50 mcg Inhale 1 puff into the lungs 2 (two) times daily. Controller    furosemide (LASIX) 40 MG tablet TAKE 1 TABLET FOUR TIMES DAILY    gabapentin (NEURONTIN) 800 MG tablet Take 1 tablet (800 mg total) by mouth 4 (four) times daily.    insulin aspart U-100 (NOVOLOG FLEXPEN U-100 INSULIN) 100 unit/mL (3 mL) InPn pen Inject 5-10 minutes before meals per sliding scale    insulin  degludec (TRESIBA FLEXTOUCH U-100) 100 unit/mL (3 mL) insulin pen Inject 20 Units into the skin every evening.    methylPREDNISolone (MEDROL DOSEPACK) 4 mg tablet use as directed    metoprolol succinate (TOPROL-XL) 25 MG 24 hr tablet Take 1 tablet (25 mg total) by mouth 2 (two) times daily.    nicotine (NICODERM CQ) 7 mg/24 hr Place 1 patch onto the skin once daily.    nicotine, polacrilex, (NICORETTE) 2 mg Gum Take 1 each (2 mg total) by mouth as needed.    ondansetron (ZOFRAN-ODT) 4 MG TbDL Take 2 tablets (8 mg total) by mouth every 6 (six) hours as needed (nausea).    OZEMPIC 0.25 mg or 0.5 mg (2 mg/3 mL) pen injector Inject 0.5 mg into the skin every 7 days.    rivaroxaban (XARELTO) 20 mg Tab Take 1 tablet (20 mg total) by mouth daily with dinner or evening meal.    rosuvastatin (CRESTOR) 20 MG tablet Take 1 tablet (20 mg total) by mouth once daily.    sacubitriL-valsartan (ENTRESTO) 24-26 mg per tablet Take 1 tablet by mouth 2 (two) times daily.    semaglutide (OZEMPIC) 0.25 mg or 0.5 mg(2 mg/1.5 mL) pen injector Inject 0.5 mg into the skin every 7 days.    traZODone (DESYREL) 100 MG tablet Take 1 tablet (100 mg total) by mouth every evening.     Family History       Problem Relation (Age of Onset)    Cancer Mother    Heart failure Father          Tobacco Use    Smoking status: Every Day     Average packs/day: 0.5 packs/day for 49.0 years (24.5 ttl pk-yrs)     Types: Cigarettes     Start date: 1975    Smokeless tobacco: Never    Tobacco comments:     Patient states 4 singles a day   Substance and Sexual Activity    Alcohol use: Yes     Comment: occasionally    Drug use: Never    Sexual activity: Not Currently     Review of Systems   Constitutional:  Negative for chills and fever.   HENT:  Negative for congestion and rhinorrhea.    Eyes:  Negative for visual disturbance.   Respiratory:  Positive for cough and shortness of breath.    Cardiovascular:  Positive for chest pain and leg swelling.   Gastrointestinal:   Positive for abdominal distention. Negative for abdominal pain, diarrhea, nausea and vomiting.   Genitourinary:  Negative for difficulty urinating and hematuria.   Musculoskeletal:  Negative for arthralgias and myalgias.   Skin:  Negative for rash and wound.   Neurological:  Negative for weakness, light-headedness and headaches.   Psychiatric/Behavioral:  Negative for agitation and sleep disturbance. The patient is not nervous/anxious.      Objective:     Vital Signs (Most Recent):  Temp: 98.3 °F (36.8 °C) (07/08/24 2034)  Pulse: 87 (07/08/24 2304)  Resp: 17 (07/08/24 2304)  BP: 137/62 (07/08/24 2314)  SpO2: 97 % (07/08/24 2304) Vital Signs (24h Range):  Temp:  [98.3 °F (36.8 °C)] 98.3 °F (36.8 °C)  Pulse:  [82-88] 87  Resp:  [11-22] 17  SpO2:  [93 %-99 %] 97 %  BP: (109-140)/() 137/62     Weight: 100.2 kg (221 lb)  Body mass index is 36.78 kg/m².     Physical Exam  Constitutional:       General: She is not in acute distress.     Appearance: She is obese. She is not toxic-appearing.   HENT:      Head: Normocephalic and atraumatic.      Right Ear: External ear normal.      Left Ear: External ear normal.      Nose: Nose normal.      Mouth/Throat:      Pharynx: Oropharynx is clear.   Eyes:      Extraocular Movements: Extraocular movements intact.      Conjunctiva/sclera: Conjunctivae normal.      Pupils: Pupils are equal, round, and reactive to light.   Neck:      Vascular: JVD present.   Cardiovascular:      Rate and Rhythm: Normal rate and regular rhythm.      Pulses: Normal pulses.      Heart sounds: Normal heart sounds.   Pulmonary:      Effort: Pulmonary effort is normal. No respiratory distress.      Breath sounds: Wheezing and rales present. No rhonchi.   Abdominal:      General: Bowel sounds are normal. There is distension.      Tenderness: There is no abdominal tenderness. There is no right CVA tenderness, left CVA tenderness, guarding or rebound.   Musculoskeletal:         General: Normal range of  motion.      Cervical back: Normal range of motion and neck supple.      Right lower leg: Edema present.      Left lower leg: Edema present.   Skin:     General: Skin is warm and dry.      Capillary Refill: Capillary refill takes less than 2 seconds.      Findings: No rash.   Neurological:      General: No focal deficit present.      Mental Status: She is alert and oriented to person, place, and time.   Psychiatric:         Mood and Affect: Mood normal.         Behavior: Behavior normal.         Thought Content: Thought content normal.              CRANIAL NERVES     CN III, IV, VI   Pupils are equal, round, and reactive to light.       Significant Labs: All pertinent labs within the past 24 hours have been reviewed.    Significant Imaging: I have reviewed all pertinent imaging results/findings within the past 24 hours.  Assessment/Plan:     * Acute exacerbation of chronic obstructive pulmonary disease (COPD)  Patient's COPD is with exacerbation noted by continued dyspnea, use of accessory muscles for breathing, and worsening of baseline hypoxia currently.  Patient is currently on COPD Pathway. Continue scheduled inhalers Steroids, Antibiotics, and Supplemental oxygen and monitor respiratory status closely.     Acute on chronic HFrEF (heart failure with reduced ejection fraction)  Patient is hypervolemic with SOB, wheezing and rales on auscultation, JVD and lower extremity 2+ edema. Home medications include BB, Entresto, farxiga and lasix 40mg BID which she reports taking QID lately.    Patient is identified as having Combined Systolic and Diastolic heart failure that is Acute on chronic. CHF is currently uncontrolled due to Rales/crackles on pulmonary exam and Pulmonary edema/pleural effusion on CXR. Latest ECHO performed and demonstrates- Results for orders placed during the hospital encounter of 02/26/24    Echo    Interpretation Summary    Left Ventricle: The left ventricle is normal in size. Mildly increased  "wall thickness. There is concentric hypertrophy. Regional wall motion abnormalities present. There is mildly reduced systolic function with a visually estimated ejection fraction of 40 - 45%. There is diastolic dysfunction.    Right Ventricle: Normal right ventricular cavity size. Systolic function is normal.    Left Atrium: Left atrium is moderately dilated.    Aortic Valve: The aortic valve is a trileaflet valve.    IVC/SVC: Normal venous pressure at 3 mmHg.  . Continue Beta Blocker, Furosemide, and ARNI and monitor clinical status closely. Monitor on telemetry. Patient is on CHF pathway.  Monitor strict Is&Os and daily weights.  Place on fluid restriction of 1.5 L. Cardiology has not been consulted. Continue to stress to patient importance of self efficacy and  on diet for CHF. Last BNP reviewed- and noted below No results for input(s): "BNP", "BNPTRIAGEBLO" in the last 168 hours.    Hypokalemia  Patient has hypokalemia which is Acute and currently worsening. Most recent potassium levels reviewed-   Lab Results   Component Value Date    K 2.7 (L) 07/08/2024   . Will continue potassium replacement per protocol and recheck repeat levels after replacement completed.     Chest pain  Patient had chest pain earlier in the day that lasted a few seconds while having episodes of cough. She is now chest pain free. Patient is known of cardiology, Dr. Diggs.  EKG NSR with PACs, no ST ischemic changes  Troponin 29.2, trending  Monitor patient closely.      Alcohol abuse  Patient reports drinking half a pint of alcohol per day for the past 20 years. She denies experiencing withdraw symptoms in the past.  CIWA 0  CIWA AR scale Q8H  Fall precautions  Multivitamins, folic acid and thiamine  Ativan PRN        CAD (coronary artery disease)  Patient with known CAD s/p stent placement, which is controlled Will continue Plavix and Statin and monitor for S/Sx of angina/ACS. Continue to monitor on telemetry.     Factor 5 Leiden " "mutation, heterozygous  Patient with history of factor 5 Leiden mutation and lower extremity DVT.   Continue home xarelto 20mg QD.      Type 2 diabetes mellitus, with long-term current use of insulin  Patient's FSGs are uncontrolled due to hyperglycemia on current medication regimen.  Last A1c reviewed-   Lab Results   Component Value Date    HGBA1C 11.6 (H) 11/30/2022     Most recent fingerstick glucose reviewed- No results for input(s): "POCTGLUCOSE" in the last 24 hours.  Current correctional scale  Low  Maintain anti-hyperglycemic dose as follows-   Antihyperglycemics (From admission, onward)      Start     Stop Route Frequency Ordered    07/09/24 0106  insulin aspart U-100 injection 0-5 Units         -- SubQ Before meals & nightly PRN 07/09/24 0007    07/09/24 0015  insulin glargine U-100 (Lantus) injection 10 Units         -- SubQ Nightly 07/09/24 0007          Hold Oral hypoglycemics while patient is in the hospital.  Repeat A1c.    Hypertension  Chronic, controlled. Latest blood pressure and vitals reviewed-     Temp:  [98.3 °F (36.8 °C)]   Pulse:  [82-93]   Resp:  [11-22]   BP: (109-140)/()   SpO2:  [93 %-99 %] .   Home meds for hypertension were reviewed and noted below.   Hypertension Medications               furosemide (LASIX) 40 MG tablet TAKE 1 TABLET FOUR TIMES DAILY    metoprolol succinate (TOPROL-XL) 25 MG 24 hr tablet Take 1 tablet (25 mg total) by mouth 2 (two) times daily.    sacubitriL-valsartan (ENTRESTO) 24-26 mg per tablet Take 1 tablet by mouth 2 (two) times daily.            While in the hospital, will manage blood pressure as follows; Continue home antihypertensive regimen    Will utilize p.r.n. blood pressure medication only if patient's blood pressure greater than 180/110 and she develops symptoms such as worsening chest pain or shortness of breath.        VTE Risk Mitigation (From admission, onward)           Ordered     rivaroxaban tablet 20 mg  With dinner         07/09/24 0007 "     Reason for No Pharmacological VTE Prophylaxis  Once        Question:  Reasons:  Answer:  Already adequately anticoagulated on oral Anticoagulants    07/09/24 0007     IP VTE HIGH RISK PATIENT  Once         07/09/24 0007     Place sequential compression device  Until discontinued         07/09/24 0007                         Sergey Pretty MD  Department of Hospital Medicine  [facility] - Emergency Department

## 2024-07-09 NOTE — ASSESSMENT & PLAN NOTE
Chronic, controlled. Latest blood pressure and vitals reviewed-     Temp:  [98.3 °F (36.8 °C)]   Pulse:  [82-93]   Resp:  [11-22]   BP: (109-140)/()   SpO2:  [93 %-99 %] .   Home meds for hypertension were reviewed and noted below.   Hypertension Medications               furosemide (LASIX) 40 MG tablet TAKE 1 TABLET FOUR TIMES DAILY    metoprolol succinate (TOPROL-XL) 25 MG 24 hr tablet Take 1 tablet (25 mg total) by mouth 2 (two) times daily.    sacubitriL-valsartan (ENTRESTO) 24-26 mg per tablet Take 1 tablet by mouth 2 (two) times daily.            While in the hospital, will manage blood pressure as follows; Continue home antihypertensive regimen    Will utilize p.r.n. blood pressure medication only if patient's blood pressure greater than 180/110 and she develops symptoms such as worsening chest pain or shortness of breath.

## 2024-07-09 NOTE — ASSESSMENT & PLAN NOTE
Patient with history of factor 5 Leiden mutation and lower extremity DVT.   Continue home xarelto 20mg QD.

## 2024-07-09 NOTE — ASSESSMENT & PLAN NOTE
Chronic, controlled. Latest blood pressure and vitals reviewed-     Temp:  [98.3 °F (36.8 °C)]   Pulse:  [73-93]   Resp:  [11-26]   BP: (109-154)/()   SpO2:  [92 %-99 %] .   Home meds for hypertension were reviewed and noted below.   Hypertension Medications               furosemide (LASIX) 40 MG tablet TAKE 1 TABLET FOUR TIMES DAILY    metoprolol succinate (TOPROL-XL) 25 MG 24 hr tablet Take 1 tablet (25 mg total) by mouth 2 (two) times daily.    sacubitriL-valsartan (ENTRESTO) 24-26 mg per tablet Take 1 tablet by mouth 2 (two) times daily.            While in the hospital, will manage blood pressure as follows; Continue home antihypertensive regimen    Will utilize p.r.n. blood pressure medication only if patient's blood pressure greater than 180/110 and she develops symptoms such as worsening chest pain or shortness of breath.

## 2024-07-09 NOTE — SUBJECTIVE & OBJECTIVE
Past Medical History:   Diagnosis Date    Depression     Diabetes mellitus, type 2     DVT (deep venous thrombosis)     Factor 5 Leiden mutation, heterozygous        History reviewed. No pertinent surgical history.    Review of patient's allergies indicates:  No Known Allergies    No current facility-administered medications on file prior to encounter.     Current Outpatient Medications on File Prior to Encounter   Medication Sig    albuterol (VENTOLIN HFA) 90 mcg/actuation inhaler Inhale 2 puffs into the lungs every 6 (six) hours as needed for Wheezing. Rescue    ALPRAZolam (XANAX) 0.25 MG tablet Take 1 tablet (0.25 mg total) by mouth 2 (two) times daily as needed for Anxiety. (Patient not taking: Reported on 2/9/2024)    blood sugar diagnostic (ACCU-CHEK GUIDE TEST STRIPS) Strp 1 strip by Misc.(Non-Drug; Combo Route) route 3 (three) times daily.    citalopram (CELEXA) 40 MG tablet Take 1 tablet (40 mg total) by mouth once daily.    clopidogreL (PLAVIX) 75 mg tablet Take 1 tablet (75 mg total) by mouth once daily.    cyclobenzaprine (FLEXERIL) 10 MG tablet Take 1 tablet (10 mg total) by mouth 2 (two) times daily as needed for Muscle spasms.    dapagliflozin propanediol (FARXIGA) 5 mg Tab tablet Take 1 tablet (5 mg total) by mouth once daily.    ezetimibe (ZETIA) 10 mg tablet Take 1 tablet (10 mg total) by mouth once daily.    fluticasone-salmeterol diskus inhaler 250-50 mcg Inhale 1 puff into the lungs 2 (two) times daily. Controller    furosemide (LASIX) 40 MG tablet TAKE 1 TABLET FOUR TIMES DAILY    gabapentin (NEURONTIN) 800 MG tablet Take 1 tablet (800 mg total) by mouth 4 (four) times daily.    insulin aspart U-100 (NOVOLOG FLEXPEN U-100 INSULIN) 100 unit/mL (3 mL) InPn pen Inject 5-10 minutes before meals per sliding scale    insulin degludec (TRESIBA FLEXTOUCH U-100) 100 unit/mL (3 mL) insulin pen Inject 20 Units into the skin every evening.    methylPREDNISolone (MEDROL DOSEPACK) 4 mg tablet use as directed     metoprolol succinate (TOPROL-XL) 25 MG 24 hr tablet Take 1 tablet (25 mg total) by mouth 2 (two) times daily.    nicotine (NICODERM CQ) 7 mg/24 hr Place 1 patch onto the skin once daily.    nicotine, polacrilex, (NICORETTE) 2 mg Gum Take 1 each (2 mg total) by mouth as needed.    ondansetron (ZOFRAN-ODT) 4 MG TbDL Take 2 tablets (8 mg total) by mouth every 6 (six) hours as needed (nausea).    OZEMPIC 0.25 mg or 0.5 mg (2 mg/3 mL) pen injector Inject 0.5 mg into the skin every 7 days.    rivaroxaban (XARELTO) 20 mg Tab Take 1 tablet (20 mg total) by mouth daily with dinner or evening meal.    rosuvastatin (CRESTOR) 20 MG tablet Take 1 tablet (20 mg total) by mouth once daily.    sacubitriL-valsartan (ENTRESTO) 24-26 mg per tablet Take 1 tablet by mouth 2 (two) times daily.    semaglutide (OZEMPIC) 0.25 mg or 0.5 mg(2 mg/1.5 mL) pen injector Inject 0.5 mg into the skin every 7 days.    traZODone (DESYREL) 100 MG tablet Take 1 tablet (100 mg total) by mouth every evening.     Family History       Problem Relation (Age of Onset)    Cancer Mother    Heart failure Father          Tobacco Use    Smoking status: Every Day     Average packs/day: 0.5 packs/day for 49.0 years (24.5 ttl pk-yrs)     Types: Cigarettes     Start date: 1975    Smokeless tobacco: Never    Tobacco comments:     Patient states 4 singles a day   Substance and Sexual Activity    Alcohol use: Yes     Comment: occasionally    Drug use: Never    Sexual activity: Not Currently     Review of Systems   Constitutional:  Negative for chills and fever.   HENT:  Negative for congestion and rhinorrhea.    Eyes:  Negative for visual disturbance.   Respiratory:  Positive for cough and shortness of breath.    Cardiovascular:  Positive for chest pain and leg swelling.   Gastrointestinal:  Positive for abdominal distention. Negative for abdominal pain, diarrhea, nausea and vomiting.   Genitourinary:  Negative for difficulty urinating and hematuria.    Musculoskeletal:  Negative for arthralgias and myalgias.   Skin:  Negative for rash and wound.   Neurological:  Negative for weakness, light-headedness and headaches.   Psychiatric/Behavioral:  Negative for agitation and sleep disturbance. The patient is not nervous/anxious.      Objective:     Vital Signs (Most Recent):  Temp: 98.3 °F (36.8 °C) (07/08/24 2034)  Pulse: 87 (07/08/24 2304)  Resp: 17 (07/08/24 2304)  BP: 137/62 (07/08/24 2314)  SpO2: 97 % (07/08/24 2304) Vital Signs (24h Range):  Temp:  [98.3 °F (36.8 °C)] 98.3 °F (36.8 °C)  Pulse:  [82-88] 87  Resp:  [11-22] 17  SpO2:  [93 %-99 %] 97 %  BP: (109-140)/() 137/62     Weight: 100.2 kg (221 lb)  Body mass index is 36.78 kg/m².     Physical Exam  Constitutional:       General: She is not in acute distress.     Appearance: She is obese. She is not toxic-appearing.   HENT:      Head: Normocephalic and atraumatic.      Right Ear: External ear normal.      Left Ear: External ear normal.      Nose: Nose normal.      Mouth/Throat:      Pharynx: Oropharynx is clear.   Eyes:      Extraocular Movements: Extraocular movements intact.      Conjunctiva/sclera: Conjunctivae normal.      Pupils: Pupils are equal, round, and reactive to light.   Neck:      Vascular: JVD present.   Cardiovascular:      Rate and Rhythm: Normal rate and regular rhythm.      Pulses: Normal pulses.      Heart sounds: Normal heart sounds.   Pulmonary:      Effort: Pulmonary effort is normal. No respiratory distress.      Breath sounds: Wheezing and rales present. No rhonchi.   Abdominal:      General: Bowel sounds are normal. There is distension.      Tenderness: There is no abdominal tenderness. There is no right CVA tenderness, left CVA tenderness, guarding or rebound.   Musculoskeletal:         General: Normal range of motion.      Cervical back: Normal range of motion and neck supple.      Right lower leg: Edema present.      Left lower leg: Edema present.   Skin:     General: Skin  is warm and dry.      Capillary Refill: Capillary refill takes less than 2 seconds.      Findings: No rash.   Neurological:      General: No focal deficit present.      Mental Status: She is alert and oriented to person, place, and time.   Psychiatric:         Mood and Affect: Mood normal.         Behavior: Behavior normal.         Thought Content: Thought content normal.              CRANIAL NERVES     CN III, IV, VI   Pupils are equal, round, and reactive to light.       Significant Labs: All pertinent labs within the past 24 hours have been reviewed.    Significant Imaging: I have reviewed all pertinent imaging results/findings within the past 24 hours.

## 2024-07-09 NOTE — ASSESSMENT & PLAN NOTE
"Patient's FSGs are uncontrolled due to hyperglycemia on current medication regimen.  Last A1c reviewed-   Lab Results   Component Value Date    HGBA1C 11.6 (H) 11/30/2022     Most recent fingerstick glucose reviewed- No results for input(s): "POCTGLUCOSE" in the last 24 hours.  Current correctional scale  Low  Maintain anti-hyperglycemic dose as follows-   Antihyperglycemics (From admission, onward)      Start     Stop Route Frequency Ordered    07/09/24 0106  insulin aspart U-100 injection 0-5 Units         -- SubQ Before meals & nightly PRN 07/09/24 0007    07/09/24 0015  insulin glargine U-100 (Lantus) injection 10 Units         -- SubQ Nightly 07/09/24 0007          Hold Oral hypoglycemics while patient is in the hospital.  Repeat A1c.  "

## 2024-07-09 NOTE — ASSESSMENT & PLAN NOTE
Patient has hypokalemia which is Acute and currently worsening. Most recent potassium levels reviewed-   Lab Results   Component Value Date    K 3.4 (L) 07/09/2024   . Will continue potassium replacement per protocol and recheck repeat levels after replacement completed.

## 2024-07-09 NOTE — ASSESSMENT & PLAN NOTE
"Patient's FSGs are uncontrolled due to hyperglycemia on current medication regimen.  Last A1c reviewed-   Lab Results   Component Value Date    HGBA1C 10.0 (H) 07/09/2024     Most recent fingerstick glucose reviewed- No results for input(s): "POCTGLUCOSE" in the last 24 hours.  Current correctional scale  Low  Maintain anti-hyperglycemic dose as follows-   Antihyperglycemics (From admission, onward)      Start     Stop Route Frequency Ordered    07/09/24 0930  insulin glargine U-100 (Lantus) injection 20 Units         -- SubQ 2 times daily 07/09/24 0928    07/09/24 0106  insulin aspart U-100 injection 0-5 Units         -- SubQ Before meals & nightly PRN 07/09/24 0007          Hold Oral hypoglycemics while patient is in the hospital.  Adjust insulin as required.   "

## 2024-07-09 NOTE — PROGRESS NOTES
Pharmacist Renal Dose Adjustment Note    Kirk Villar is a 58 y.o. female being treated with the medication Levaquin    Patient Data:    Vital Signs (Most Recent):  Temp: 98.3 °F (36.8 °C) (07/08/24 2034)  Pulse: 90 (07/08/24 2354)  Resp: 14 (07/08/24 2354)  BP: (!) 138/59 (07/08/24 2354)  SpO2: (!) 94 % (07/08/24 2354) Vital Signs (72h Range):  Temp:  [98.3 °F (36.8 °C)]   Pulse:  [82-90]   Resp:  [11-22]   BP: (109-140)/()   SpO2:  [93 %-99 %]      Recent Labs   Lab 07/08/24 2040   CREATININE 1.30*     Serum creatinine: 1.3 mg/dL (H) 07/08/24 2040  Estimated creatinine clearance: 54 mL/min (A)    Medication:Levaquin dose: 750 mg frequency every 48 hours will be changed to medication:Levaquin dose:750 mg frequency:every 24 hours. Current CrCl is > 50. Pharmacy to follow and adjust dose as needed.     Pharmacist's Name: Nick Mcqueen  Pharmacist's Extension: [phone]

## 2024-07-09 NOTE — ASSESSMENT & PLAN NOTE
"Patient is hypervolemic with SOB, wheezing and rales on auscultation, JVD and lower extremity 2+ edema. Home medications include BB, Entresto, farxiga and lasix 40mg BID which she reports taking QID lately.    Patient is identified as having Combined Systolic and Diastolic heart failure that is Acute on chronic. CHF is currently uncontrolled due to Rales/crackles on pulmonary exam and Pulmonary edema/pleural effusion on CXR. Latest ECHO performed and demonstrates- Results for orders placed during the hospital encounter of 02/26/24    Echo    Interpretation Summary    Left Ventricle: The left ventricle is normal in size. Mildly increased wall thickness. There is concentric hypertrophy. Regional wall motion abnormalities present. There is mildly reduced systolic function with a visually estimated ejection fraction of 40 - 45%. There is diastolic dysfunction.    Right Ventricle: Normal right ventricular cavity size. Systolic function is normal.    Left Atrium: Left atrium is moderately dilated.    Aortic Valve: The aortic valve is a trileaflet valve.    IVC/SVC: Normal venous pressure at 3 mmHg.  . Continue Beta Blocker, Furosemide, and ARNI and monitor clinical status closely. Monitor on telemetry. Patient is on CHF pathway.  Monitor strict Is&Os and daily weights.  Place on fluid restriction of 1.5 L. Cardiology has not been consulted. Continue to stress to patient importance of self efficacy and  on diet for CHF. Last BNP reviewed- and noted below No results for input(s): "BNP", "BNPTRIAGEBLO" in the last 168 hours.  "

## 2024-07-09 NOTE — ASSESSMENT & PLAN NOTE
Patient reports drinking half a pint of alcohol per day for the past 20 years. She denies experiencing withdraw symptoms in the past.  CIWA 0  CIWA AR scale Q8H  Fall precautions  Multivitamins, folic acid and thiamine  Ativan PRN

## 2024-07-09 NOTE — ASSESSMENT & PLAN NOTE
Patient had chest pain earlier in the day that lasted a few seconds while having episodes of cough. She is now chest pain free. Patient is known of cardiology, Dr. Diggs.  EKG NSR with PACs, no ST ischemic changes  Troponin 29.2, trending  Monitor patient closely.

## 2024-07-09 NOTE — SUBJECTIVE & OBJECTIVE
Interval History: Patient seen and examined at the bedside, reports no change in her symptoms, continues to be short of breath.     Review of Systems   Respiratory:  Positive for cough, shortness of breath and wheezing.    Cardiovascular:  Positive for leg swelling.     Objective:     Vital Signs (Most Recent):  Temp: 98.3 °F (36.8 °C) (07/08/24 2034)  Pulse: 84 (07/09/24 1334)  Resp: 13 (07/09/24 1334)  BP: (!) 154/86 (07/09/24 0324)  SpO2: (!) 92 % (07/09/24 1334) Vital Signs (24h Range):  Temp:  [98.3 °F (36.8 °C)] 98.3 °F (36.8 °C)  Pulse:  [73-93] 84  Resp:  [11-26] 13  SpO2:  [92 %-99 %] 92 %  BP: (109-154)/() 154/86     Weight: 100.2 kg (221 lb)  Body mass index is 36.78 kg/m².    Intake/Output Summary (Last 24 hours) at 7/9/2024 1418  Last data filed at 7/9/2024 0642  Gross per 24 hour   Intake 250 ml   Output 500 ml   Net -250 ml         Physical Exam  Vitals and nursing note reviewed.   Constitutional:       Appearance: She is ill-appearing.   HENT:      Head: Normocephalic and atraumatic.      Mouth/Throat:      Mouth: Mucous membranes are moist.   Eyes:      Extraocular Movements: Extraocular movements intact.      Pupils: Pupils are equal, round, and reactive to light.   Cardiovascular:      Rate and Rhythm: Normal rate and regular rhythm.   Pulmonary:      Effort: Pulmonary effort is normal.      Breath sounds: Wheezing and rales present.      Comments: On 3L NC  Abdominal:      General: Bowel sounds are normal.      Palpations: Abdomen is soft.   Musculoskeletal:         General: Normal range of motion.      Cervical back: Normal range of motion and neck supple.      Right lower leg: Edema present.      Left lower leg: Edema present.   Neurological:      General: No focal deficit present.      Mental Status: She is alert and oriented to person, place, and time. Mental status is at baseline.   Psychiatric:         Mood and Affect: Mood normal.         Behavior: Behavior normal.              Significant Labs: All pertinent labs within the past 24 hours have been reviewed.    Significant Imaging: I have reviewed all pertinent imaging results/findings within the past 24 hours.

## 2024-07-09 NOTE — ASSESSMENT & PLAN NOTE
Patient had chest pain earlier in the day that lasted a few seconds while having episodes of cough. She is now chest pain free.   Patient is known of cardiology, Dr. Diggs.  EKG NSR with PACs, no ST ischemic changes  Troponin 29.2 --> 21.  Monitor for chest pain, EKG changes or tele events.

## 2024-07-09 NOTE — ASSESSMENT & PLAN NOTE
Patient has hypokalemia which is Acute and currently worsening. Most recent potassium levels reviewed-   Lab Results   Component Value Date    K 2.7 (L) 07/08/2024   . Will continue potassium replacement per protocol and recheck repeat levels after replacement completed.

## 2024-07-09 NOTE — PROGRESS NOTES
[facility] - Emergency Department  [facility] Medicine  Progress Note    Patient Name: Kirk Villar  MRN: [id number]  Patient Class: IP- Inpatient   Admission Date: 7/8/2024  Length of Stay: 0 days  Attending Physician: Job Allen MD  Primary Care Provider: Anabell Mendez NP        Subjective:     Principal Problem:Acute exacerbation of chronic obstructive pulmonary disease (COPD)        HPI:  Patient is a 57 y/o female with PMHx of COPD/Asthma, CAD, HFrEF (EF 40-45%), DM2, HTN, Factor 5 Leiden, DVT (on Xarelto) and depression who presents to the ED with complaint of SOB, productive cough, increase in abdominal girth and leg swelling that are getting worse for the past few days. Patient reports worsening SOB when laying flat. Patient also reports trying home inhalers and nebulizer earlier in the day with minimum relief. Patient also reports having no appetite for the past 4 days. She states that she had chest pain earlier in the day that lasted a few seconds while having episodes of cough. She is now chest pain free. Patient is known of cardiology, Dr. Diggs. She also sees pulmonology, Dr. Mcdaniels. Patient states that she is cutting down on cigarettes and has been smoking 4 cigarettes per day. She reports drinking half a pint of alcohol per day for the past 20 years and denies experiencing withdraw symptoms in the past. Patient denies any other drug use. Patient denies fever, chills, nausea, vomiting, bowel or urinary habit changes.    In the ED virtal signs showed /68, HR 88, RR 18, SpO2 93% on room air and afebrile. Blood work showed H/H 11.8/37.3, WBC 6.7, , sodium 133, potassium 2.7, BUN/Cr 15/1.3, glucose 395, p-BNP 3100 from 2700 five months ago, troponin 29.2. EKG NSR with PACs, no ST ischemic changes. Chest xray showing B/L pulmonary edema. Patient was given IV lasix, replenished potassium and breathing treatments. Patient will be admitted to the hospital for further  management.    Overview/Hospital Course:  7/9- NO change in respiratory status, continue steroids, antibiotics, nebulization and IV diuresis. Remains on 3L NC.     Interval History: Patient seen and examined at the bedside, reports no change in her symptoms, continues to be short of breath.     Review of Systems   Respiratory:  Positive for cough, shortness of breath and wheezing.    Cardiovascular:  Positive for leg swelling.     Objective:     Vital Signs (Most Recent):  Temp: 98.3 °F (36.8 °C) (07/08/24 2034)  Pulse: 84 (07/09/24 1334)  Resp: 13 (07/09/24 1334)  BP: (!) 154/86 (07/09/24 0324)  SpO2: (!) 92 % (07/09/24 1334) Vital Signs (24h Range):  Temp:  [98.3 °F (36.8 °C)] 98.3 °F (36.8 °C)  Pulse:  [73-93] 84  Resp:  [11-26] 13  SpO2:  [92 %-99 %] 92 %  BP: (109-154)/() 154/86     Weight: 100.2 kg (221 lb)  Body mass index is 36.78 kg/m².    Intake/Output Summary (Last 24 hours) at 7/9/2024 1418  Last data filed at 7/9/2024 0642  Gross per 24 hour   Intake 250 ml   Output 500 ml   Net -250 ml         Physical Exam  Vitals and nursing note reviewed.   Constitutional:       Appearance: She is ill-appearing.   HENT:      Head: Normocephalic and atraumatic.      Mouth/Throat:      Mouth: Mucous membranes are moist.   Eyes:      Extraocular Movements: Extraocular movements intact.      Pupils: Pupils are equal, round, and reactive to light.   Cardiovascular:      Rate and Rhythm: Normal rate and regular rhythm.   Pulmonary:      Effort: Pulmonary effort is normal.      Breath sounds: Wheezing and rales present.      Comments: On 3L NC  Abdominal:      General: Bowel sounds are normal.      Palpations: Abdomen is soft.   Musculoskeletal:         General: Normal range of motion.      Cervical back: Normal range of motion and neck supple.      Right lower leg: Edema present.      Left lower leg: Edema present.   Neurological:      General: No focal deficit present.      Mental Status: She is alert and oriented  to person, place, and time. Mental status is at baseline.   Psychiatric:         Mood and Affect: Mood normal.         Behavior: Behavior normal.             Significant Labs: All pertinent labs within the past 24 hours have been reviewed.    Significant Imaging: I have reviewed all pertinent imaging results/findings within the past 24 hours.    Assessment/Plan:      * Acute exacerbation of chronic obstructive pulmonary disease (COPD)  Continues to smoke cigarettes.  Patient's COPD is with exacerbation noted by continued dyspnea, use of accessory muscles for breathing, and worsening of baseline hypoxia currently.    Patient is currently on COPD Pathway.   Continue scheduled inhalers Steroids, Antibiotics, and Supplemental oxygen and monitor respiratory status closely.   Follows with Dr. Mcdaniels.     Acute respiratory failure with hypoxia  Sec to combination of CHF and COPD exacerbation.   Requiring 3L NC.   Management as below.  Wean O2 as tolerated, home O2 eval closer to discharge.     Acute on chronic HFrEF (heart failure with reduced ejection fraction)  Patient is hypervolemic with SOB, wheezing and rales on auscultation, JVD and lower extremity 2+ edema.   Home medications include BB, Entresto, farxiga and lasix 40mg BID which she reports taking QID lately.    Patient is identified as having Combined Systolic and Diastolic heart failure that is Acute on chronic. CHF is currently uncontrolled due to Rales/crackles on pulmonary exam and Pulmonary edema/pleural effusion on CXR. Latest ECHO performed and demonstrates- Results for orders placed during the hospital encounter of 02/26/24    Echo    Interpretation Summary    Left Ventricle: The left ventricle is normal in size. Mildly increased wall thickness. There is concentric hypertrophy. Regional wall motion abnormalities present. There is mildly reduced systolic function with a visually estimated ejection fraction of 40 - 45%. There is diastolic dysfunction.     "Right Ventricle: Normal right ventricular cavity size. Systolic function is normal.    Left Atrium: Left atrium is moderately dilated.    Aortic Valve: The aortic valve is a trileaflet valve.    IVC/SVC: Normal venous pressure at 3 mmHg.  . Continue Beta Blocker, Furosemide, and ARNI and monitor clinical status closely. Monitor on telemetry. Patient is on CHF pathway.  Monitor strict Is&Os and daily weights.  Place on fluid restriction of 1.5 L. Cardiology has not been consulted. Continue to stress to patient importance of self efficacy and  on diet for CHF. Last BNP reviewed- and noted below No results for input(s): "BNP", "BNPTRIAGEBLO" in the last 168 hours.    Chest pain  Patient had chest pain earlier in the day that lasted a few seconds while having episodes of cough. She is now chest pain free.   Patient is known of cardiology, Dr. Diggs.  EKG NSR with PACs, no ST ischemic changes  Troponin 29.2 --> 21.  Monitor for chest pain, EKG changes or tele events.         CAD (coronary artery disease)  Patient with known CAD s/p stent placement, which is controlled Will continue Plavix and Statin and monitor for S/Sx of angina/ACS. Continue to monitor on telemetry.     Alcohol abuse  Patient reports drinking half a pint of alcohol per day for the past 20 years. She denies experiencing withdraw symptoms in the past.  CIWA 0 on admission.   CIWA AR scale Q8H  Fall precautions  Multivitamins, folic acid and thiamine  Ativan PRN        Factor 5 Leiden mutation, heterozygous  Patient with history of factor 5 Leiden mutation and lower extremity DVT.   Continue home xarelto 20mg QD.      Hypokalemia  Patient has hypokalemia which is Acute and currently worsening. Most recent potassium levels reviewed-   Lab Results   Component Value Date    K 3.4 (L) 07/09/2024   . Will continue potassium replacement per protocol and recheck repeat levels after replacement completed.     Uncontrolled type 2 diabetes mellitus with " "hyperglycemia  Patient's FSGs are uncontrolled due to hyperglycemia on current medication regimen.  Last A1c reviewed-   Lab Results   Component Value Date    HGBA1C 10.0 (H) 07/09/2024     Most recent fingerstick glucose reviewed- No results for input(s): "POCTGLUCOSE" in the last 24 hours.  Current correctional scale  Low  Maintain anti-hyperglycemic dose as follows-   Antihyperglycemics (From admission, onward)      Start     Stop Route Frequency Ordered    07/09/24 0930  insulin glargine U-100 (Lantus) injection 20 Units         -- SubQ 2 times daily 07/09/24 0928 07/09/24 0106  insulin aspart U-100 injection 0-5 Units         -- SubQ Before meals & nightly PRN 07/09/24 0007          Hold Oral hypoglycemics while patient is in the hospital.  Adjust insulin as required.     Hypertension  Chronic, controlled. Latest blood pressure and vitals reviewed-     Temp:  [98.3 °F (36.8 °C)]   Pulse:  [73-93]   Resp:  [11-26]   BP: (109-154)/()   SpO2:  [92 %-99 %] .   Home meds for hypertension were reviewed and noted below.   Hypertension Medications               furosemide (LASIX) 40 MG tablet TAKE 1 TABLET FOUR TIMES DAILY    metoprolol succinate (TOPROL-XL) 25 MG 24 hr tablet Take 1 tablet (25 mg total) by mouth 2 (two) times daily.    sacubitriL-valsartan (ENTRESTO) 24-26 mg per tablet Take 1 tablet by mouth 2 (two) times daily.            While in the hospital, will manage blood pressure as follows; Continue home antihypertensive regimen    Will utilize p.r.n. blood pressure medication only if patient's blood pressure greater than 180/110 and she develops symptoms such as worsening chest pain or shortness of breath.        VTE Risk Mitigation (From admission, onward)           Ordered     rivaroxaban tablet 20 mg  With dinner         07/09/24 0007     Reason for No Pharmacological VTE Prophylaxis  Once        Question:  Reasons:  Answer:  Already adequately anticoagulated on oral Anticoagulants    07/09/24 " 0007     IP VTE HIGH RISK PATIENT  Once         07/09/24 0007     Place sequential compression device  Until discontinued         07/09/24 0007                    Discharge Planning   EDWARD: 7/11/2024     Code Status: Full Code   Is the patient medically ready for discharge?:     Reason for patient still in hospital (select all that apply): Patient trending condition and Treatment  Discharge Plan A: Home                  ISAURO SPRING MD  Department of Hospital Medicine   [facility] - Emergency Department

## 2024-07-10 PROBLEM — F10.230 ALCOHOL DEPENDENCE WITH UNCOMPLICATED WITHDRAWAL: Status: ACTIVE | Noted: 2024-07-09

## 2024-07-10 LAB
ANION GAP SERPL CALCULATED.3IONS-SCNC: 11 MMOL/L (ref 7–16)
BASOPHILS # BLD AUTO: 0.01 K/UL (ref 0–0.2)
BASOPHILS NFR BLD AUTO: 0.1 % (ref 0–1)
BUN SERPL-MCNC: 28 MG/DL (ref 7–18)
BUN/CREAT SERPL: 19 (ref 6–20)
CALCIUM SERPL-MCNC: 8.9 MG/DL (ref 8.5–10.1)
CHLORIDE SERPL-SCNC: 96 MMOL/L (ref 98–107)
CO2 SERPL-SCNC: 33 MMOL/L (ref 21–32)
CREAT SERPL-MCNC: 1.45 MG/DL (ref 0.55–1.02)
DIFFERENTIAL METHOD BLD: ABNORMAL
EGFR (NO RACE VARIABLE) (RUSH/TITUS): 41 ML/MIN/1.73M2
EOSINOPHIL # BLD AUTO: 0 K/UL (ref 0–0.5)
EOSINOPHIL NFR BLD AUTO: 0 % (ref 1–4)
ERYTHROCYTE [DISTWIDTH] IN BLOOD BY AUTOMATED COUNT: 14.2 % (ref 11.5–14.5)
GLUCOSE SERPL-MCNC: 341 MG/DL (ref 70–105)
GLUCOSE SERPL-MCNC: 397 MG/DL (ref 74–106)
GLUCOSE SERPL-MCNC: 404 MG/DL (ref 70–105)
GLUCOSE SERPL-MCNC: 459 MG/DL (ref 70–105)
HCT VFR BLD AUTO: 37.6 % (ref 38–47)
HGB BLD-MCNC: 11.9 G/DL (ref 12–16)
IMM GRANULOCYTES # BLD AUTO: 0.04 K/UL (ref 0–0.04)
IMM GRANULOCYTES NFR BLD: 0.4 % (ref 0–0.4)
LYMPHOCYTES # BLD AUTO: 0.38 K/UL (ref 1–4.8)
LYMPHOCYTES NFR BLD AUTO: 4.1 % (ref 27–41)
MAGNESIUM SERPL-MCNC: 2 MG/DL (ref 1.7–2.3)
MCH RBC QN AUTO: 31.3 PG (ref 27–31)
MCHC RBC AUTO-ENTMCNC: 31.6 G/DL (ref 32–36)
MCV RBC AUTO: 98.9 FL (ref 80–96)
MONOCYTES # BLD AUTO: 0.32 K/UL (ref 0–0.8)
MONOCYTES NFR BLD AUTO: 3.5 % (ref 2–6)
MPC BLD CALC-MCNC: 10.4 FL (ref 9.4–12.4)
NEUTROPHILS # BLD AUTO: 8.43 K/UL (ref 1.8–7.7)
NEUTROPHILS NFR BLD AUTO: 91.9 % (ref 53–65)
NRBC # BLD AUTO: 0 X10E3/UL
NRBC, AUTO (.00): 0 %
PLATELET # BLD AUTO: 220 K/UL (ref 150–400)
POTASSIUM SERPL-SCNC: 4.5 MMOL/L (ref 3.5–5.1)
RBC # BLD AUTO: 3.8 M/UL (ref 4.2–5.4)
SODIUM SERPL-SCNC: 135 MMOL/L (ref 136–145)
WBC # BLD AUTO: 9.18 K/UL (ref 4.5–11)

## 2024-07-10 PROCEDURE — 11000001 HC ACUTE MED/SURG PRIVATE ROOM

## 2024-07-10 PROCEDURE — 25000242 PHARM REV CODE 250 ALT 637 W/ HCPCS: Performed by: GENERAL PRACTICE

## 2024-07-10 PROCEDURE — S4991 NICOTINE PATCH NONLEGEND: HCPCS | Performed by: GENERAL PRACTICE

## 2024-07-10 PROCEDURE — 63600175 PHARM REV CODE 636 W HCPCS: Performed by: INTERNAL MEDICINE

## 2024-07-10 PROCEDURE — 25000003 PHARM REV CODE 250: Performed by: GENERAL PRACTICE

## 2024-07-10 PROCEDURE — 82962 GLUCOSE BLOOD TEST: CPT

## 2024-07-10 PROCEDURE — 94640 AIRWAY INHALATION TREATMENT: CPT

## 2024-07-10 PROCEDURE — 85025 COMPLETE CBC W/AUTO DIFF WBC: CPT | Performed by: GENERAL PRACTICE

## 2024-07-10 PROCEDURE — 83735 ASSAY OF MAGNESIUM: CPT | Performed by: INTERNAL MEDICINE

## 2024-07-10 PROCEDURE — 36415 COLL VENOUS BLD VENIPUNCTURE: CPT | Performed by: GENERAL PRACTICE

## 2024-07-10 PROCEDURE — 94761 N-INVAS EAR/PLS OXIMETRY MLT: CPT

## 2024-07-10 PROCEDURE — 99900035 HC TECH TIME PER 15 MIN (STAT)

## 2024-07-10 PROCEDURE — 27000221 HC OXYGEN, UP TO 24 HOURS

## 2024-07-10 PROCEDURE — 80048 BASIC METABOLIC PNL TOTAL CA: CPT | Performed by: GENERAL PRACTICE

## 2024-07-10 PROCEDURE — 63600175 PHARM REV CODE 636 W HCPCS: Performed by: GENERAL PRACTICE

## 2024-07-10 PROCEDURE — 99900031 HC PATIENT EDUCATION (STAT)

## 2024-07-10 PROCEDURE — 99233 SBSQ HOSP IP/OBS HIGH 50: CPT | Mod: ,,, | Performed by: INTERNAL MEDICINE

## 2024-07-10 RX ORDER — INSULIN GLARGINE 100 [IU]/ML
25 INJECTION, SOLUTION SUBCUTANEOUS 2 TIMES DAILY
Status: DISCONTINUED | OUTPATIENT
Start: 2024-07-10 | End: 2024-07-11

## 2024-07-10 RX ORDER — FUROSEMIDE 40 MG/1
40 TABLET ORAL DAILY
Status: DISCONTINUED | OUTPATIENT
Start: 2024-07-11 | End: 2024-07-12 | Stop reason: HOSPADM

## 2024-07-10 RX ADMIN — LEVOFLOXACIN 750 MG: 750 INJECTION, SOLUTION INTRAVENOUS at 01:07

## 2024-07-10 RX ADMIN — INSULIN ASPART 2 UNITS: 100 INJECTION, SOLUTION INTRAVENOUS; SUBCUTANEOUS at 09:07

## 2024-07-10 RX ADMIN — FOLIC ACID 1 MG: 1 TABLET ORAL at 09:07

## 2024-07-10 RX ADMIN — IPRATROPIUM BROMIDE AND ALBUTEROL SULFATE 3 ML: 2.5; .5 SOLUTION RESPIRATORY (INHALATION) at 07:07

## 2024-07-10 RX ADMIN — CITALOPRAM HYDROBROMIDE 40 MG: 20 TABLET ORAL at 09:07

## 2024-07-10 RX ADMIN — THIAMINE HYDROCHLORIDE 500 MG: 100 INJECTION, SOLUTION INTRAMUSCULAR; INTRAVENOUS at 09:07

## 2024-07-10 RX ADMIN — THIAMINE HYDROCHLORIDE 500 MG: 100 INJECTION, SOLUTION INTRAMUSCULAR; INTRAVENOUS at 03:07

## 2024-07-10 RX ADMIN — METHYLPREDNISOLONE SODIUM SUCCINATE 60 MG: 40 INJECTION, POWDER, FOR SOLUTION INTRAMUSCULAR; INTRAVENOUS at 05:07

## 2024-07-10 RX ADMIN — SACUBITRIL AND VALSARTAN 1 TABLET: 24; 26 TABLET, FILM COATED ORAL at 08:07

## 2024-07-10 RX ADMIN — METOPROLOL SUCCINATE 25 MG: 25 TABLET, EXTENDED RELEASE ORAL at 08:07

## 2024-07-10 RX ADMIN — TRAZODONE HYDROCHLORIDE 100 MG: 50 TABLET ORAL at 08:07

## 2024-07-10 RX ADMIN — FUROSEMIDE 40 MG: 10 INJECTION, SOLUTION INTRAMUSCULAR; INTRAVENOUS at 08:07

## 2024-07-10 RX ADMIN — GABAPENTIN 800 MG: 400 CAPSULE ORAL at 08:07

## 2024-07-10 RX ADMIN — FUROSEMIDE 40 MG: 10 INJECTION, SOLUTION INTRAMUSCULAR; INTRAVENOUS at 09:07

## 2024-07-10 RX ADMIN — LORAZEPAM 2 MG: 1 TABLET ORAL at 05:07

## 2024-07-10 RX ADMIN — EZETIMIBE 10 MG: 10 TABLET ORAL at 09:07

## 2024-07-10 RX ADMIN — CLOPIDOGREL BISULFATE 75 MG: 75 TABLET ORAL at 09:07

## 2024-07-10 RX ADMIN — LORAZEPAM 2 MG: 1 TABLET ORAL at 09:07

## 2024-07-10 RX ADMIN — INSULIN ASPART 5 UNITS: 100 INJECTION, SOLUTION INTRAVENOUS; SUBCUTANEOUS at 12:07

## 2024-07-10 RX ADMIN — NICOTINE 1 PATCH: 14 PATCH, EXTENDED RELEASE TRANSDERMAL at 09:07

## 2024-07-10 RX ADMIN — THIAMINE HYDROCHLORIDE 500 MG: 100 INJECTION, SOLUTION INTRAMUSCULAR; INTRAVENOUS at 06:07

## 2024-07-10 RX ADMIN — INSULIN GLARGINE 25 UNITS: 100 INJECTION, SOLUTION SUBCUTANEOUS at 09:07

## 2024-07-10 RX ADMIN — RIVAROXABAN 20 MG: 20 TABLET, FILM COATED ORAL at 04:07

## 2024-07-10 RX ADMIN — ATORVASTATIN CALCIUM 80 MG: 80 TABLET, FILM COATED ORAL at 09:07

## 2024-07-10 RX ADMIN — METOPROLOL SUCCINATE 25 MG: 25 TABLET, EXTENDED RELEASE ORAL at 09:07

## 2024-07-10 RX ADMIN — IPRATROPIUM BROMIDE AND ALBUTEROL SULFATE 3 ML: 2.5; .5 SOLUTION RESPIRATORY (INHALATION) at 01:07

## 2024-07-10 RX ADMIN — THERA TABS 1 TABLET: TAB at 09:07

## 2024-07-10 RX ADMIN — SACUBITRIL AND VALSARTAN 1 TABLET: 24; 26 TABLET, FILM COATED ORAL at 09:07

## 2024-07-10 NOTE — ASSESSMENT & PLAN NOTE
Chronic, controlled. Latest blood pressure and vitals reviewed-     Temp:  [97.4 °F (36.3 °C)-98.8 °F (37.1 °C)]   Pulse:  [60-91]   Resp:  [13-29]   BP: (121-168)/(68-89)   SpO2:  [89 %-96 %] .   Home meds for hypertension were reviewed and noted below.   Hypertension Medications               furosemide (LASIX) 40 MG tablet TAKE 1 TABLET FOUR TIMES DAILY    metoprolol succinate (TOPROL-XL) 25 MG 24 hr tablet Take 1 tablet (25 mg total) by mouth 2 (two) times daily.    sacubitriL-valsartan (ENTRESTO) 24-26 mg per tablet Take 1 tablet by mouth 2 (two) times daily.            While in the hospital, will manage blood pressure as follows; Continue home antihypertensive regimen    Will utilize p.r.n. blood pressure medication only if patient's blood pressure greater than 180/110 and she develops symptoms such as worsening chest pain or shortness of breath.

## 2024-07-10 NOTE — ASSESSMENT & PLAN NOTE
"Patient is hypervolemic with SOB, wheezing and rales on auscultation, JVD and lower extremity 2+ edema.   Home medications include BB, Entresto, farxiga and lasix 40mg BID which she reports taking QID lately.    Patient is identified as having Combined Systolic and Diastolic heart failure that is Acute on chronic. CHF is currently uncontrolled due to Rales/crackles on pulmonary exam and Pulmonary edema/pleural effusion on CXR. Latest ECHO performed and demonstrates- Results for orders placed during the hospital encounter of 02/26/24    Echo    Interpretation Summary    Left Ventricle: The left ventricle is normal in size. Mildly increased wall thickness. There is concentric hypertrophy. Regional wall motion abnormalities present. There is mildly reduced systolic function with a visually estimated ejection fraction of 40 - 45%. There is diastolic dysfunction.    Right Ventricle: Normal right ventricular cavity size. Systolic function is normal.    Left Atrium: Left atrium is moderately dilated.    Aortic Valve: The aortic valve is a trileaflet valve.    IVC/SVC: Normal venous pressure at 3 mmHg.  . Continue Beta Blocker, Furosemide (IV transitioning to PO on 7/11), and ARNI and monitor clinical status closely. Monitor on telemetry. Patient is on CHF pathway.  Monitor strict Is&Os and daily weights.  Place on fluid restriction of 1.5 L. Cardiology has not been consulted. Continue to stress to patient importance of self efficacy and  on diet for CHF. Last BNP reviewed- and noted below No results for input(s): "BNP", "BNPTRIAGEBLO" in the last 168 hours.  "

## 2024-07-10 NOTE — PLAN OF CARE
Problem: Adult Inpatient Plan of Care  Goal: Plan of Care Review  Outcome: Progressing  Flowsheets (Taken 7/9/2024 2109)  Plan of Care Reviewed With: patient  Goal: Optimal Comfort and Wellbeing  Outcome: Progressing  Intervention: Monitor Pain and Promote Comfort  Flowsheets (Taken 7/9/2024 2109)  Pain Management Interventions:   pillow support provided   pain management plan reviewed with patient/caregiver   position adjusted   quiet environment facilitated   relaxation techniques promoted  Intervention: Provide Person-Centered Care  Flowsheets (Taken 7/9/2024 2109)  Trust Relationship/Rapport:   care explained   choices provided   emotional support provided   empathic listening provided   questions answered   questions encouraged   reassurance provided   thoughts/feelings acknowledged     Problem: Diabetes Comorbidity  Goal: Blood Glucose Level Within Targeted Range  Outcome: Progressing  Intervention: Monitor and Manage Glycemia  Flowsheets (Taken 7/9/2024 2109)  Glycemic Management:   blood glucose monitored   oral hydration promoted   supplemental insulin given

## 2024-07-10 NOTE — SUBJECTIVE & OBJECTIVE
Interval History: Patient seen and examined at the bedside, reports mild improvement. Required a few doses of Ativan for alcohol withdrawal last evening.     Review of Systems   Respiratory:  Positive for cough, shortness of breath and wheezing.    Cardiovascular:  Positive for leg swelling.     Objective:     Vital Signs (Most Recent):  Temp: 97.4 °F (36.3 °C) (07/10/24 1102)  Pulse: 76 (07/10/24 1102)  Resp: 18 (07/10/24 1102)  BP: (!) 143/88 (07/10/24 1102)  SpO2: 96 % (07/10/24 1102) Vital Signs (24h Range):  Temp:  [97.4 °F (36.3 °C)-98.8 °F (37.1 °C)] 97.4 °F (36.3 °C)  Pulse:  [60-91] 76  Resp:  [13-29] 18  SpO2:  [89 %-96 %] 96 %  BP: (121-168)/(68-89) 143/88     Weight: 102.1 kg (225 lb 1.4 oz)  Body mass index is 36.33 kg/m².    Intake/Output Summary (Last 24 hours) at 7/10/2024 1117  Last data filed at 7/10/2024 0337  Gross per 24 hour   Intake 352.16 ml   Output --   Net 352.16 ml         Physical Exam  Vitals and nursing note reviewed.   Constitutional:       Appearance: She is ill-appearing.   HENT:      Head: Normocephalic and atraumatic.      Mouth/Throat:      Mouth: Mucous membranes are moist.   Eyes:      Extraocular Movements: Extraocular movements intact.      Pupils: Pupils are equal, round, and reactive to light.   Cardiovascular:      Rate and Rhythm: Normal rate and regular rhythm.   Pulmonary:      Effort: Pulmonary effort is normal.      Breath sounds: Wheezing and rales present.      Comments: On 3L NC  Abdominal:      General: Bowel sounds are normal.      Palpations: Abdomen is soft.   Musculoskeletal:         General: Normal range of motion.      Cervical back: Normal range of motion and neck supple.      Right lower leg: Edema present.      Left lower leg: Edema present.   Neurological:      General: No focal deficit present.      Mental Status: She is alert and oriented to person, place, and time. Mental status is at baseline.   Psychiatric:         Mood and Affect: Mood normal.          Behavior: Behavior normal.             Significant Labs: All pertinent labs within the past 24 hours have been reviewed.    Significant Imaging: I have reviewed all pertinent imaging results/findings within the past 24 hours.

## 2024-07-10 NOTE — ASSESSMENT & PLAN NOTE
Continues to smoke cigarettes.  Patient's COPD is with exacerbation noted by continued dyspnea, use of accessory muscles for breathing, and worsening of baseline hypoxia currently.    Patient is currently on COPD Pathway.   Continue scheduled inhalers Steroids, Antibiotics, and Supplemental oxygen and monitor respiratory status closely.   Follows with Dr. Mcdaniels.    bed rails

## 2024-07-10 NOTE — ASSESSMENT & PLAN NOTE
"Patient's FSGs are uncontrolled due to hyperglycemia on current medication regimen.  Last A1c reviewed-   Lab Results   Component Value Date    HGBA1C 10.0 (H) 07/09/2024     Most recent fingerstick glucose reviewed- No results for input(s): "POCTGLUCOSE" in the last 24 hours.  Current correctional scale  Low  Maintain anti-hyperglycemic dose as follows-   Antihyperglycemics (From admission, onward)    Start     Stop Route Frequency Ordered    07/10/24 0900  insulin glargine U-100 (Lantus) injection 25 Units         -- SubQ 2 times daily 07/10/24 0708    07/09/24 0106  insulin aspart U-100 injection 0-5 Units         -- SubQ Before meals & nightly PRN 07/09/24 0007        Hold Oral hypoglycemics while patient is in the hospital.  Adjust insulin as required.   "

## 2024-07-10 NOTE — ASSESSMENT & PLAN NOTE
Patient has hypokalemia which is Acute and currently worsening. Most recent potassium levels reviewed-   Lab Results   Component Value Date    K 4.5 07/10/2024   . Will continue potassium replacement per protocol and recheck repeat levels after replacement completed.

## 2024-07-10 NOTE — ASSESSMENT & PLAN NOTE
Patient reports drinking half a pint of alcohol per day for the past 20 years. She denies experiencing withdraw symptoms in the past.  Fall/seizure precautions.   Multivitamins, folic acid and thiamine continued  Ativan PRN- required a few doses in the evening of 7/9/24.

## 2024-07-10 NOTE — PROGRESS NOTES
[facility] Medicine  Progress Note    Patient Name: Kirk Villar  MRN: [id number]  Patient Class: IP- Inpatient   Admission Date: 7/8/2024  Length of Stay: 1 days  Attending Physician: Job Allen MD  Primary Care Provider: Anabell Mendez NP        Subjective:     Principal Problem:Acute exacerbation of chronic obstructive pulmonary disease (COPD)        HPI:  Patient is a 57 y/o female with PMHx of COPD/Asthma, CAD, HFrEF (EF 40-45%), DM2, HTN, Factor 5 Leiden, DVT (on Xarelto) and depression who presents to the ED with complaint of SOB, productive cough, increase in abdominal girth and leg swelling that are getting worse for the past few days. Patient reports worsening SOB when laying flat. Patient also reports trying home inhalers and nebulizer earlier in the day with minimum relief. Patient also reports having no appetite for the past 4 days. She states that she had chest pain earlier in the day that lasted a few seconds while having episodes of cough. She is now chest pain free. Patient is known of cardiology, Dr. Diggs. She also sees pulmonology, Dr. Mcdaniels. Patient states that she is cutting down on cigarettes and has been smoking 4 cigarettes per day. She reports drinking half a pint of alcohol per day for the past 20 years and denies experiencing withdraw symptoms in the past. Patient denies any other drug use. Patient denies fever, chills, nausea, vomiting, bowel or urinary habit changes.    In the ED virtal signs showed /68, HR 88, RR 18, SpO2 93% on room air and afebrile. Blood work showed H/H 11.8/37.3, WBC 6.7, , sodium 133, potassium 2.7, BUN/Cr 15/1.3, glucose 395, p-BNP 3100 from 2700 five months ago, troponin 29.2. EKG NSR with PACs, no ST ischemic changes. Chest xray showing B/L pulmonary edema. Patient was given IV lasix, replenished potassium and breathing treatments. Patient will be admitted to the hospital for further  management.    Overview/Hospital Course:  7/9- No change in respiratory status, continue steroids, antibiotics, nebulization and IV diuresis. Remains on 3L NC.   7/10- Very slow improvement, reduce steroids frequency, continue IV diuresis, nebulization and antibiotics. On 2L NC now.       Interval History: Patient seen and examined at the bedside, reports mild improvement. Required a few doses of Ativan for alcohol withdrawal last evening.     Review of Systems   Respiratory:  Positive for cough, shortness of breath and wheezing.    Cardiovascular:  Positive for leg swelling.     Objective:     Vital Signs (Most Recent):  Temp: 97.4 °F (36.3 °C) (07/10/24 1102)  Pulse: 76 (07/10/24 1102)  Resp: 18 (07/10/24 1102)  BP: (!) 143/88 (07/10/24 1102)  SpO2: 96 % (07/10/24 1102) Vital Signs (24h Range):  Temp:  [97.4 °F (36.3 °C)-98.8 °F (37.1 °C)] 97.4 °F (36.3 °C)  Pulse:  [60-91] 76  Resp:  [13-29] 18  SpO2:  [89 %-96 %] 96 %  BP: (121-168)/(68-89) 143/88     Weight: 102.1 kg (225 lb 1.4 oz)  Body mass index is 36.33 kg/m².    Intake/Output Summary (Last 24 hours) at 7/10/2024 1117  Last data filed at 7/10/2024 0337  Gross per 24 hour   Intake 352.16 ml   Output --   Net 352.16 ml         Physical Exam  Vitals and nursing note reviewed.   Constitutional:       Appearance: She is ill-appearing.   HENT:      Head: Normocephalic and atraumatic.      Mouth/Throat:      Mouth: Mucous membranes are moist.   Eyes:      Extraocular Movements: Extraocular movements intact.      Pupils: Pupils are equal, round, and reactive to light.   Cardiovascular:      Rate and Rhythm: Normal rate and regular rhythm.   Pulmonary:      Effort: Pulmonary effort is normal.      Breath sounds: Wheezing and rales present.      Comments: On 3L NC  Abdominal:      General: Bowel sounds are normal.      Palpations: Abdomen is soft.   Musculoskeletal:         General: Normal range of motion.      Cervical back: Normal range of motion and neck supple.       Right lower leg: Edema present.      Left lower leg: Edema present.   Neurological:      General: No focal deficit present.      Mental Status: She is alert and oriented to person, place, and time. Mental status is at baseline.   Psychiatric:         Mood and Affect: Mood normal.         Behavior: Behavior normal.             Significant Labs: All pertinent labs within the past 24 hours have been reviewed.    Significant Imaging: I have reviewed all pertinent imaging results/findings within the past 24 hours.    Assessment/Plan:      * Acute exacerbation of chronic obstructive pulmonary disease (COPD)  Continues to smoke cigarettes.  Patient's COPD is with exacerbation noted by continued dyspnea, use of accessory muscles for breathing, and worsening of baseline hypoxia currently.    Patient is currently on COPD Pathway.   Continue scheduled inhalers Steroids, Antibiotics, and Supplemental oxygen and monitor respiratory status closely.   Follows with Dr. Mcdaniels.     Acute respiratory failure with hypoxia  Sec to combination of CHF and COPD exacerbation.   Requiring 2L NC now (weaned down from 4L).   Management as below.  Wean O2 as tolerated, home O2 eval closer to discharge.     Acute on chronic HFrEF (heart failure with reduced ejection fraction)  Patient is hypervolemic with SOB, wheezing and rales on auscultation, JVD and lower extremity 2+ edema.   Home medications include BB, Entresto, farxiga and lasix 40mg BID which she reports taking QID lately.    Patient is identified as having Combined Systolic and Diastolic heart failure that is Acute on chronic. CHF is currently uncontrolled due to Rales/crackles on pulmonary exam and Pulmonary edema/pleural effusion on CXR. Latest ECHO performed and demonstrates- Results for orders placed during the hospital encounter of 02/26/24    Echo    Interpretation Summary    Left Ventricle: The left ventricle is normal in size. Mildly increased wall thickness. There is  "concentric hypertrophy. Regional wall motion abnormalities present. There is mildly reduced systolic function with a visually estimated ejection fraction of 40 - 45%. There is diastolic dysfunction.    Right Ventricle: Normal right ventricular cavity size. Systolic function is normal.    Left Atrium: Left atrium is moderately dilated.    Aortic Valve: The aortic valve is a trileaflet valve.    IVC/SVC: Normal venous pressure at 3 mmHg.  . Continue Beta Blocker, Furosemide (IV transitioning to PO on 7/11), and ARNI and monitor clinical status closely. Monitor on telemetry. Patient is on CHF pathway.  Monitor strict Is&Os and daily weights.  Place on fluid restriction of 1.5 L. Cardiology has not been consulted. Continue to stress to patient importance of self efficacy and  on diet for CHF. Last BNP reviewed- and noted below No results for input(s): "BNP", "BNPTRIAGEBLO" in the last 168 hours.    Chest pain  Patient had chest pain earlier in the day that lasted a few seconds while having episodes of cough. She is now chest pain free.   Patient is known of cardiology, Dr. Diggs.  EKG NSR with PACs, no ST ischemic changes  Troponin 29.2 --> 21.  Monitor for chest pain, EKG changes or tele events.         CAD (coronary artery disease)  Patient with known CAD s/p stent placement, which is controlled Will continue Plavix and Statin and monitor for S/Sx of angina/ACS. Continue to monitor on telemetry.     Alcohol dependence with uncomplicated withdrawal  Patient reports drinking half a pint of alcohol per day for the past 20 years. She denies experiencing withdraw symptoms in the past.  Fall/seizure precautions.   Multivitamins, folic acid and thiamine continued  Ativan PRN- required a few doses in the evening of 7/9/24.        Factor 5 Leiden mutation, heterozygous  Patient with history of factor 5 Leiden mutation and lower extremity DVT.   Continue home xarelto 20mg QD.      Hypokalemia  Patient has hypokalemia " "which is Acute and currently worsening. Most recent potassium levels reviewed-   Lab Results   Component Value Date    K 4.5 07/10/2024   . Will continue potassium replacement per protocol and recheck repeat levels after replacement completed.     Uncontrolled type 2 diabetes mellitus with hyperglycemia  Patient's FSGs are uncontrolled due to hyperglycemia on current medication regimen.  Last A1c reviewed-   Lab Results   Component Value Date    HGBA1C 10.0 (H) 07/09/2024     Most recent fingerstick glucose reviewed- No results for input(s): "POCTGLUCOSE" in the last 24 hours.  Current correctional scale  Low  Maintain anti-hyperglycemic dose as follows-   Antihyperglycemics (From admission, onward)      Start     Stop Route Frequency Ordered    07/10/24 0900  insulin glargine U-100 (Lantus) injection 25 Units         -- SubQ 2 times daily 07/10/24 0708    07/09/24 0106  insulin aspart U-100 injection 0-5 Units         -- SubQ Before meals & nightly PRN 07/09/24 0007          Hold Oral hypoglycemics while patient is in the hospital.  Adjust insulin as required.     Hypertension  Chronic, controlled. Latest blood pressure and vitals reviewed-     Temp:  [97.4 °F (36.3 °C)-98.8 °F (37.1 °C)]   Pulse:  [60-91]   Resp:  [13-29]   BP: (121-168)/(68-89)   SpO2:  [89 %-96 %] .   Home meds for hypertension were reviewed and noted below.   Hypertension Medications               furosemide (LASIX) 40 MG tablet TAKE 1 TABLET FOUR TIMES DAILY    metoprolol succinate (TOPROL-XL) 25 MG 24 hr tablet Take 1 tablet (25 mg total) by mouth 2 (two) times daily.    sacubitriL-valsartan (ENTRESTO) 24-26 mg per tablet Take 1 tablet by mouth 2 (two) times daily.            While in the hospital, will manage blood pressure as follows; Continue home antihypertensive regimen    Will utilize p.r.n. blood pressure medication only if patient's blood pressure greater than 180/110 and she develops symptoms such as worsening chest pain or shortness " of breath.        VTE Risk Mitigation (From admission, onward)           Ordered     rivaroxaban tablet 20 mg  With dinner         07/09/24 0007     Reason for No Pharmacological VTE Prophylaxis  Once        Question:  Reasons:  Answer:  Already adequately anticoagulated on oral Anticoagulants    07/09/24 0007     IP VTE HIGH RISK PATIENT  Once         07/09/24 0007     Place sequential compression device  Until discontinued         07/09/24 0007                    Discharge Planning   EDWARD: 7/12/2024     Code Status: Full Code   Is the patient medically ready for discharge?:     Reason for patient still in hospital (select all that apply): Patient trending condition and Treatment  Discharge Plan A: Home                  ISAURO SPRING MD  Department of Hospital Medicine   [facility] - [facility]

## 2024-07-11 PROBLEM — N18.32 CKD STAGE 3B, GFR 30-44 ML/MIN: Status: ACTIVE | Noted: 2024-07-11

## 2024-07-11 PROBLEM — E87.6 HYPOKALEMIA: Status: RESOLVED | Noted: 2024-07-09 | Resolved: 2024-07-11

## 2024-07-11 LAB
ANION GAP SERPL CALCULATED.3IONS-SCNC: 9 MMOL/L (ref 7–16)
BASOPHILS # BLD AUTO: 0.01 K/UL (ref 0–0.2)
BASOPHILS NFR BLD AUTO: 0.1 % (ref 0–1)
BUN SERPL-MCNC: 34 MG/DL (ref 7–18)
BUN/CREAT SERPL: 20 (ref 6–20)
CALCIUM SERPL-MCNC: 9.2 MG/DL (ref 8.5–10.1)
CHLORIDE SERPL-SCNC: 96 MMOL/L (ref 98–107)
CO2 SERPL-SCNC: 37 MMOL/L (ref 21–32)
CREAT SERPL-MCNC: 1.66 MG/DL (ref 0.55–1.02)
DIFFERENTIAL METHOD BLD: ABNORMAL
EGFR (NO RACE VARIABLE) (RUSH/TITUS): 35 ML/MIN/1.73M2
EOSINOPHIL # BLD AUTO: 0 K/UL (ref 0–0.5)
EOSINOPHIL NFR BLD AUTO: 0 % (ref 1–4)
ERYTHROCYTE [DISTWIDTH] IN BLOOD BY AUTOMATED COUNT: 14.3 % (ref 11.5–14.5)
GLUCOSE SERPL-MCNC: 380 MG/DL (ref 70–105)
GLUCOSE SERPL-MCNC: 380 MG/DL (ref 74–106)
GLUCOSE SERPL-MCNC: 387 MG/DL (ref 70–105)
GLUCOSE SERPL-MCNC: 404 MG/DL (ref 70–105)
HCO3 UR-SCNC: 38 MMOL/L (ref 21–28)
HCT VFR BLD AUTO: 38 % (ref 38–47)
HGB BLD-MCNC: 11.7 G/DL (ref 12–16)
IMM GRANULOCYTES # BLD AUTO: 0.06 K/UL (ref 0–0.04)
IMM GRANULOCYTES NFR BLD: 0.6 % (ref 0–0.4)
LYMPHOCYTES # BLD AUTO: 0.68 K/UL (ref 1–4.8)
LYMPHOCYTES NFR BLD AUTO: 6.6 % (ref 27–41)
MCH RBC QN AUTO: 31.2 PG (ref 27–31)
MCHC RBC AUTO-ENTMCNC: 30.8 G/DL (ref 32–36)
MCV RBC AUTO: 101.3 FL (ref 80–96)
MONOCYTES # BLD AUTO: 0.47 K/UL (ref 0–0.8)
MONOCYTES NFR BLD AUTO: 4.6 % (ref 2–6)
MPC BLD CALC-MCNC: 10.5 FL (ref 9.4–12.4)
NEUTROPHILS # BLD AUTO: 9.07 K/UL (ref 1.8–7.7)
NEUTROPHILS NFR BLD AUTO: 88.1 % (ref 53–65)
NRBC # BLD AUTO: 0 X10E3/UL
NRBC, AUTO (.00): 0 %
PCO2 BLDA: 56 MMHG (ref 35–48)
PH SMN: 7.44 [PH] (ref 7.35–7.45)
PLATELET # BLD AUTO: 223 K/UL (ref 150–400)
PO2 BLDA: 77 MMHG (ref 83–108)
POC BASE EXCESS: 11.7 MMOL/L (ref -2–3)
POC SATURATED O2: 96 % (ref 95–98)
POTASSIUM SERPL-SCNC: 4.2 MMOL/L (ref 3.5–5.1)
RBC # BLD AUTO: 3.75 M/UL (ref 4.2–5.4)
SODIUM SERPL-SCNC: 138 MMOL/L (ref 136–145)
WBC # BLD AUTO: 10.29 K/UL (ref 4.5–11)

## 2024-07-11 PROCEDURE — 85025 COMPLETE CBC W/AUTO DIFF WBC: CPT | Performed by: GENERAL PRACTICE

## 2024-07-11 PROCEDURE — 36415 COLL VENOUS BLD VENIPUNCTURE: CPT | Performed by: GENERAL PRACTICE

## 2024-07-11 PROCEDURE — 63600175 PHARM REV CODE 636 W HCPCS: Performed by: INTERNAL MEDICINE

## 2024-07-11 PROCEDURE — 27000221 HC OXYGEN, UP TO 24 HOURS

## 2024-07-11 PROCEDURE — 25000003 PHARM REV CODE 250: Performed by: INTERNAL MEDICINE

## 2024-07-11 PROCEDURE — 82962 GLUCOSE BLOOD TEST: CPT

## 2024-07-11 PROCEDURE — S4991 NICOTINE PATCH NONLEGEND: HCPCS | Performed by: GENERAL PRACTICE

## 2024-07-11 PROCEDURE — 94640 AIRWAY INHALATION TREATMENT: CPT

## 2024-07-11 PROCEDURE — 36600 WITHDRAWAL OF ARTERIAL BLOOD: CPT

## 2024-07-11 PROCEDURE — 11000001 HC ACUTE MED/SURG PRIVATE ROOM

## 2024-07-11 PROCEDURE — 99900035 HC TECH TIME PER 15 MIN (STAT)

## 2024-07-11 PROCEDURE — 82803 BLOOD GASES ANY COMBINATION: CPT

## 2024-07-11 PROCEDURE — 25000003 PHARM REV CODE 250: Performed by: GENERAL PRACTICE

## 2024-07-11 PROCEDURE — 63600175 PHARM REV CODE 636 W HCPCS: Performed by: GENERAL PRACTICE

## 2024-07-11 PROCEDURE — 99232 SBSQ HOSP IP/OBS MODERATE 35: CPT | Mod: ,,, | Performed by: INTERNAL MEDICINE

## 2024-07-11 PROCEDURE — 80048 BASIC METABOLIC PNL TOTAL CA: CPT | Performed by: GENERAL PRACTICE

## 2024-07-11 PROCEDURE — 94761 N-INVAS EAR/PLS OXIMETRY MLT: CPT

## 2024-07-11 PROCEDURE — 25000242 PHARM REV CODE 250 ALT 637 W/ HCPCS: Performed by: GENERAL PRACTICE

## 2024-07-11 RX ORDER — LEVOFLOXACIN 500 MG/1
500 TABLET, FILM COATED ORAL DAILY
Status: COMPLETED | OUTPATIENT
Start: 2024-07-11 | End: 2024-07-12

## 2024-07-11 RX ORDER — INSULIN GLARGINE 100 [IU]/ML
30 INJECTION, SOLUTION SUBCUTANEOUS 2 TIMES DAILY
Status: DISCONTINUED | OUTPATIENT
Start: 2024-07-11 | End: 2024-07-12 | Stop reason: HOSPADM

## 2024-07-11 RX ORDER — PREDNISONE 20 MG/1
40 TABLET ORAL DAILY
Status: DISCONTINUED | OUTPATIENT
Start: 2024-07-11 | End: 2024-07-12 | Stop reason: HOSPADM

## 2024-07-11 RX ADMIN — IPRATROPIUM BROMIDE AND ALBUTEROL SULFATE 3 ML: 2.5; .5 SOLUTION RESPIRATORY (INHALATION) at 07:07

## 2024-07-11 RX ADMIN — THERA TABS 1 TABLET: TAB at 09:07

## 2024-07-11 RX ADMIN — IPRATROPIUM BROMIDE AND ALBUTEROL SULFATE 3 ML: 2.5; .5 SOLUTION RESPIRATORY (INHALATION) at 12:07

## 2024-07-11 RX ADMIN — LORAZEPAM 2 MG: 1 TABLET ORAL at 09:07

## 2024-07-11 RX ADMIN — SACUBITRIL AND VALSARTAN 1 TABLET: 24; 26 TABLET, FILM COATED ORAL at 09:07

## 2024-07-11 RX ADMIN — INSULIN ASPART 5 UNITS: 100 INJECTION, SOLUTION INTRAVENOUS; SUBCUTANEOUS at 05:07

## 2024-07-11 RX ADMIN — PREDNISONE 40 MG: 20 TABLET ORAL at 09:07

## 2024-07-11 RX ADMIN — LORAZEPAM 2 MG: 1 TABLET ORAL at 06:07

## 2024-07-11 RX ADMIN — GABAPENTIN 800 MG: 400 CAPSULE ORAL at 09:07

## 2024-07-11 RX ADMIN — METOPROLOL SUCCINATE 25 MG: 25 TABLET, EXTENDED RELEASE ORAL at 09:07

## 2024-07-11 RX ADMIN — ATORVASTATIN CALCIUM 80 MG: 80 TABLET, FILM COATED ORAL at 09:07

## 2024-07-11 RX ADMIN — CLOPIDOGREL BISULFATE 75 MG: 75 TABLET ORAL at 09:07

## 2024-07-11 RX ADMIN — CITALOPRAM HYDROBROMIDE 40 MG: 20 TABLET ORAL at 09:07

## 2024-07-11 RX ADMIN — FUROSEMIDE 40 MG: 40 TABLET ORAL at 09:07

## 2024-07-11 RX ADMIN — Medication 100 MG: at 02:07

## 2024-07-11 RX ADMIN — LEVOFLOXACIN 750 MG: 750 INJECTION, SOLUTION INTRAVENOUS at 01:07

## 2024-07-11 RX ADMIN — Medication 100 MG: at 09:07

## 2024-07-11 RX ADMIN — METHYLPREDNISOLONE SODIUM SUCCINATE 60 MG: 40 INJECTION, POWDER, FOR SOLUTION INTRAMUSCULAR; INTRAVENOUS at 06:07

## 2024-07-11 RX ADMIN — INSULIN GLARGINE 30 UNITS: 100 INJECTION, SOLUTION SUBCUTANEOUS at 09:07

## 2024-07-11 RX ADMIN — INSULIN ASPART 5 UNITS: 100 INJECTION, SOLUTION INTRAVENOUS; SUBCUTANEOUS at 12:07

## 2024-07-11 RX ADMIN — RIVAROXABAN 20 MG: 20 TABLET, FILM COATED ORAL at 05:07

## 2024-07-11 RX ADMIN — INSULIN ASPART 3 UNITS: 100 INJECTION, SOLUTION INTRAVENOUS; SUBCUTANEOUS at 09:07

## 2024-07-11 RX ADMIN — EZETIMIBE 10 MG: 10 TABLET ORAL at 09:07

## 2024-07-11 RX ADMIN — FOLIC ACID 1 MG: 1 TABLET ORAL at 09:07

## 2024-07-11 RX ADMIN — LEVOFLOXACIN 500 MG: 500 TABLET, FILM COATED ORAL at 09:07

## 2024-07-11 RX ADMIN — INSULIN GLARGINE 25 UNITS: 100 INJECTION, SOLUTION SUBCUTANEOUS at 09:07

## 2024-07-11 RX ADMIN — NICOTINE 1 PATCH: 14 PATCH, EXTENDED RELEASE TRANSDERMAL at 09:07

## 2024-07-11 RX ADMIN — Medication 100 MG: at 06:07

## 2024-07-11 NOTE — ASSESSMENT & PLAN NOTE
Sec to combination of CHF and COPD exacerbation.   Requiring 1-2L NC now (weaned down from 4L).   ABG reviewed on 7/11, consistent with hypoxia, pCO2 56 but pH normal.   Management as below.  Wean O2 as tolerated, home O2 eval closer to discharge.   Anticipate discharge home tomorrow.

## 2024-07-11 NOTE — ASSESSMENT & PLAN NOTE
Creatine stable for now. BMP reviewed- noted Estimated Creatinine Clearance: 43.7 mL/min (A) (based on SCr of 1.66 mg/dL (H)). according to latest data. Based on current GFR, CKD stage is stage 3 - GFR 30-59.    Monitor with diuresis, expect bump in Cr.   Monitor UOP and serial BMP and adjust therapy as needed. Renally dose meds. Avoid nephrotoxic medications and procedures.

## 2024-07-11 NOTE — ASSESSMENT & PLAN NOTE
Patient has hypokalemia which is Acute and currently worsening. Most recent potassium levels reviewed-   Lab Results   Component Value Date    K 4.2 07/11/2024   . Will continue potassium replacement per protocol and recheck repeat levels after replacement completed.

## 2024-07-11 NOTE — ASSESSMENT & PLAN NOTE
"Patient is hypervolemic with SOB, wheezing and rales on auscultation, JVD and lower extremity 2+ edema.   Home medications include BB, Entresto, farxiga and lasix 40mg BID which she reports taking QID lately.    Patient is identified as having Combined Systolic and Diastolic heart failure that is Acute on chronic. CHF is currently uncontrolled due to Rales/crackles on pulmonary exam and Pulmonary edema/pleural effusion on CXR. Latest ECHO performed and demonstrates- Results for orders placed during the hospital encounter of 02/26/24    Echo    Interpretation Summary    Left Ventricle: The left ventricle is normal in size. Mildly increased wall thickness. There is concentric hypertrophy. Regional wall motion abnormalities present. There is mildly reduced systolic function with a visually estimated ejection fraction of 40 - 45%. There is diastolic dysfunction.    Right Ventricle: Normal right ventricular cavity size. Systolic function is normal.    Left Atrium: Left atrium is moderately dilated.    Aortic Valve: The aortic valve is a trileaflet valve.    IVC/SVC: Normal venous pressure at 3 mmHg.  . Continue Beta Blocker, Furosemide (IV transitioned to PO on 7/11), and ARNI and monitor clinical status closely. Monitor on telemetry. Patient is on CHF pathway.  Monitor strict Is&Os and daily weights.  Place on fluid restriction of 1.5 L. Cardiology has not been consulted. Continue to stress to patient importance of self efficacy and  on diet for CHF. Last BNP reviewed- and noted below No results for input(s): "BNP", "BNPTRIAGEBLO" in the last 168 hours.  "

## 2024-07-11 NOTE — PLAN OF CARE
Problem: Gas Exchange Impaired  Goal: Optimal Gas Exchange  7/11/2024 0730 by Priya Hamilton, RRT  Outcome: Progressing  7/11/2024 0730 by Priya Hamilton, RRT  Outcome: Progressing

## 2024-07-11 NOTE — PLAN OF CARE
Problem: Adult Inpatient Plan of Care  Goal: Plan of Care Review  Outcome: Progressing  Goal: Patient-Specific Goal (Individualized)  Outcome: Progressing  Goal: Absence of Hospital-Acquired Illness or Injury  Outcome: Progressing  Goal: Optimal Comfort and Wellbeing  Outcome: Progressing  Goal: Readiness for Transition of Care  Outcome: Progressing     Problem: Diabetes Comorbidity  Goal: Blood Glucose Level Within Targeted Range  Outcome: Progressing     Problem: Breathing Pattern Ineffective  Goal: Effective Breathing Pattern  Outcome: Progressing     Problem: Gas Exchange Impaired  Goal: Optimal Gas Exchange  Outcome: Progressing     Problem: Airway Clearance Ineffective  Goal: Effective Airway Clearance  Outcome: Progressing

## 2024-07-11 NOTE — ASSESSMENT & PLAN NOTE
Chronic, controlled. Latest blood pressure and vitals reviewed-     Temp:  [97.3 °F (36.3 °C)-98.1 °F (36.7 °C)]   Pulse:  [66-78]   Resp:  [18-20]   BP: (112-135)/(74-86)   SpO2:  [77 %-97 %] .   Home meds for hypertension were reviewed and noted below.   Hypertension Medications               furosemide (LASIX) 40 MG tablet TAKE 1 TABLET FOUR TIMES DAILY    metoprolol succinate (TOPROL-XL) 25 MG 24 hr tablet Take 1 tablet (25 mg total) by mouth 2 (two) times daily.    sacubitriL-valsartan (ENTRESTO) 24-26 mg per tablet Take 1 tablet by mouth 2 (two) times daily.            While in the hospital, will manage blood pressure as follows; Continue home antihypertensive regimen    Will utilize p.r.n. blood pressure medication only if patient's blood pressure greater than 180/110 and she develops symptoms such as worsening chest pain or shortness of breath.

## 2024-07-11 NOTE — ASSESSMENT & PLAN NOTE
"Patient's FSGs are uncontrolled due to hyperglycemia on current medication regimen.  Last A1c reviewed-   Lab Results   Component Value Date    HGBA1C 10.0 (H) 07/09/2024     Most recent fingerstick glucose reviewed- No results for input(s): "POCTGLUCOSE" in the last 24 hours.  Current correctional scale  Low  Maintain anti-hyperglycemic dose as follows-   Antihyperglycemics (From admission, onward)    Start     Stop Route Frequency Ordered    07/11/24 2100  insulin glargine U-100 (Lantus) injection 30 Units         -- SubQ 2 times daily 07/11/24 1312    07/09/24 0106  insulin aspart U-100 injection 0-5 Units         -- SubQ Before meals & nightly PRN 07/09/24 0007        Hold Oral hypoglycemics while patient is in the hospital.  Adjust insulin as required.   "

## 2024-07-11 NOTE — ASSESSMENT & PLAN NOTE
Patient reports drinking half a pint of alcohol per day for the past 20 years. She denies experiencing withdraw symptoms in the past.  Fall/seizure precautions.   Multivitamins, folic acid and thiamine continued  Ativan PRN- requiring some doses on 7/9 and 7/10.   Closely monitor for DT's.

## 2024-07-11 NOTE — PLAN OF CARE
Problem: Adult Inpatient Plan of Care  Goal: Plan of Care Review  Outcome: Progressing  Goal: Absence of Hospital-Acquired Illness or Injury  Outcome: Progressing  Goal: Optimal Comfort and Wellbeing  Outcome: Progressing     Problem: Diabetes Comorbidity  Goal: Blood Glucose Level Within Targeted Range  Outcome: Progressing

## 2024-07-11 NOTE — PROGRESS NOTES
[facility] Medicine  Progress Note    Patient Name: Kirk Villar  MRN: [id number]  Patient Class: IP- Inpatient   Admission Date: 7/8/2024  Length of Stay: 2 days  Attending Physician: Job Allen MD  Primary Care Provider: Anabell Mendez NP        Subjective:     Principal Problem:Acute exacerbation of chronic obstructive pulmonary disease (COPD)        HPI:  Patient is a 57 y/o female with PMHx of COPD/Asthma, CAD, HFrEF (EF 40-45%), DM2, HTN, Factor 5 Leiden, DVT (on Xarelto) and depression who presents to the ED with complaint of SOB, productive cough, increase in abdominal girth and leg swelling that are getting worse for the past few days. Patient reports worsening SOB when laying flat. Patient also reports trying home inhalers and nebulizer earlier in the day with minimum relief. Patient also reports having no appetite for the past 4 days. She states that she had chest pain earlier in the day that lasted a few seconds while having episodes of cough. She is now chest pain free. Patient is known of cardiology, Dr. Diggs. She also sees pulmonology, Dr. Mcdaniels. Patient states that she is cutting down on cigarettes and has been smoking 4 cigarettes per day. She reports drinking half a pint of alcohol per day for the past 20 years and denies experiencing withdraw symptoms in the past. Patient denies any other drug use. Patient denies fever, chills, nausea, vomiting, bowel or urinary habit changes.    In the ED virtal signs showed /68, HR 88, RR 18, SpO2 93% on room air and afebrile. Blood work showed H/H 11.8/37.3, WBC 6.7, , sodium 133, potassium 2.7, BUN/Cr 15/1.3, glucose 395, p-BNP 3100 from 2700 five months ago, troponin 29.2. EKG NSR with PACs, no ST ischemic changes. Chest xray showing B/L pulmonary edema. Patient was given IV lasix, replenished potassium and breathing treatments. Patient will be admitted to the hospital for further  management.    Overview/Hospital Course:  7/9- No change in respiratory status, continue steroids, antibiotics, nebulization and IV diuresis. Remains on 3L NC.   7/10- Very slow improvement, reduce steroids frequency, continue IV diuresis, nebulization and antibiotics. On 2L NC now.   7/11- Required Ativan based on CIWA score overnight and this morning, monitor closely for DT's. Continue diuresis, steroids, antibiotics (transitioned to PO).       Interval History: Patient seen and examined at the bedside, reports mild improvement. Required a few doses of Ativan for alcohol withdrawal last evening.     Review of Systems   Respiratory:  Positive for cough, shortness of breath and wheezing.    Cardiovascular:  Positive for leg swelling.     Objective:     Vital Signs (Most Recent):  Temp: 97.5 °F (36.4 °C) (07/11/24 1057)  Pulse: 70 (07/11/24 1236)  Resp: 20 (07/11/24 1236)  BP: 132/74 (07/11/24 1057)  SpO2: (!) 77 % (07/11/24 1230) Vital Signs (24h Range):  Temp:  [97.3 °F (36.3 °C)-98.1 °F (36.7 °C)] 97.5 °F (36.4 °C)  Pulse:  [66-78] 70  Resp:  [18-20] 20  SpO2:  [77 %-97 %] 77 %  BP: (112-135)/(74-86) 132/74     Weight: 103 kg (227 lb 1.2 oz)  Body mass index is 36.65 kg/m².    Intake/Output Summary (Last 24 hours) at 7/11/2024 1313  Last data filed at 7/11/2024 1224  Gross per 24 hour   Intake 441.68 ml   Output --   Net 441.68 ml         Physical Exam  Vitals and nursing note reviewed.   Constitutional:       Appearance: She is ill-appearing.      Comments: Drowsy but arousable.    HENT:      Head: Normocephalic and atraumatic.      Mouth/Throat:      Mouth: Mucous membranes are moist.   Eyes:      Extraocular Movements: Extraocular movements intact.      Pupils: Pupils are equal, round, and reactive to light.   Cardiovascular:      Rate and Rhythm: Normal rate and regular rhythm.   Pulmonary:      Effort: Pulmonary effort is normal.      Breath sounds: Wheezing present.      Comments: On 3L NC  Abdominal:       General: Bowel sounds are normal.      Palpations: Abdomen is soft.   Musculoskeletal:         General: Normal range of motion.      Cervical back: Normal range of motion and neck supple.      Right lower leg: Edema present.      Left lower leg: Edema present.   Neurological:      General: No focal deficit present.      Mental Status: She is oriented to person, place, and time. Mental status is at baseline.   Psychiatric:         Mood and Affect: Mood normal.         Behavior: Behavior normal.             Significant Labs: All pertinent labs within the past 24 hours have been reviewed.    Significant Imaging: I have reviewed all pertinent imaging results/findings within the past 24 hours.    Assessment/Plan:      * Acute exacerbation of chronic obstructive pulmonary disease (COPD)  Continues to smoke cigarettes.  Patient's COPD is with exacerbation noted by continued dyspnea, use of accessory muscles for breathing, and worsening of baseline hypoxia currently.    Patient is currently on COPD Pathway.   Continue scheduled inhalers Steroids, Antibiotics, and Supplemental oxygen and monitor respiratory status closely.   Follows with Dr. Mcdaniels.     Acute respiratory failure with hypoxia  Sec to combination of CHF and COPD exacerbation.   Requiring 1-2L NC now (weaned down from 4L).   ABG reviewed on 7/11, consistent with hypoxia, pCO2 56 but pH normal.   Management as below.  Wean O2 as tolerated, home O2 eval closer to discharge.   Anticipate discharge home tomorrow.     Acute on chronic HFrEF (heart failure with reduced ejection fraction)  Patient is hypervolemic with SOB, wheezing and rales on auscultation, JVD and lower extremity 2+ edema.   Home medications include BB, Entresto, farxiga and lasix 40mg BID which she reports taking QID lately.    Patient is identified as having Combined Systolic and Diastolic heart failure that is Acute on chronic. CHF is currently uncontrolled due to Rales/crackles on pulmonary exam  "and Pulmonary edema/pleural effusion on CXR. Latest ECHO performed and demonstrates- Results for orders placed during the hospital encounter of 02/26/24    Echo    Interpretation Summary    Left Ventricle: The left ventricle is normal in size. Mildly increased wall thickness. There is concentric hypertrophy. Regional wall motion abnormalities present. There is mildly reduced systolic function with a visually estimated ejection fraction of 40 - 45%. There is diastolic dysfunction.    Right Ventricle: Normal right ventricular cavity size. Systolic function is normal.    Left Atrium: Left atrium is moderately dilated.    Aortic Valve: The aortic valve is a trileaflet valve.    IVC/SVC: Normal venous pressure at 3 mmHg.  . Continue Beta Blocker, Furosemide (IV transitioned to PO on 7/11), and ARNI and monitor clinical status closely. Monitor on telemetry. Patient is on CHF pathway.  Monitor strict Is&Os and daily weights.  Place on fluid restriction of 1.5 L. Cardiology has not been consulted. Continue to stress to patient importance of self efficacy and  on diet for CHF. Last BNP reviewed- and noted below No results for input(s): "BNP", "BNPTRIAGEBLO" in the last 168 hours.    Chest pain  Patient had chest pain earlier in the day that lasted a few seconds while having episodes of cough. She is now chest pain free.   Patient is known of cardiology, Dr. Diggs.  EKG NSR with PACs, no ST ischemic changes  Troponin 29.2 --> 21.  Monitor for chest pain, EKG changes or tele events.         Alcohol dependence with uncomplicated withdrawal  Patient reports drinking half a pint of alcohol per day for the past 20 years. She denies experiencing withdraw symptoms in the past.  Fall/seizure precautions.   Multivitamins, folic acid and thiamine continued  Ativan PRN- requiring some doses on 7/9 and 7/10.   Closely monitor for DT's.         CKD stage 3b, GFR 30-44 ml/min  Creatine stable for now. BMP reviewed- noted Estimated " "Creatinine Clearance: 43.7 mL/min (A) (based on SCr of 1.66 mg/dL (H)). according to latest data. Based on current GFR, CKD stage is stage 3 - GFR 30-59.    Monitor with diuresis, expect bump in Cr.   Monitor UOP and serial BMP and adjust therapy as needed. Renally dose meds. Avoid nephrotoxic medications and procedures.    CAD (coronary artery disease)  Patient with known CAD s/p stent placement, which is controlled Will continue Plavix and Statin and monitor for S/Sx of angina/ACS. Continue to monitor on telemetry.     Factor 5 Leiden mutation, heterozygous  Patient with history of factor 5 Leiden mutation and lower extremity DVT.   Continue home xarelto 20mg QD.      Uncontrolled type 2 diabetes mellitus with hyperglycemia  Patient's FSGs are uncontrolled due to hyperglycemia on current medication regimen.  Last A1c reviewed-   Lab Results   Component Value Date    HGBA1C 10.0 (H) 07/09/2024     Most recent fingerstick glucose reviewed- No results for input(s): "POCTGLUCOSE" in the last 24 hours.  Current correctional scale  Low  Maintain anti-hyperglycemic dose as follows-   Antihyperglycemics (From admission, onward)      Start     Stop Route Frequency Ordered    07/11/24 2100  insulin glargine U-100 (Lantus) injection 30 Units         -- SubQ 2 times daily 07/11/24 1312    07/09/24 0106  insulin aspart U-100 injection 0-5 Units         -- SubQ Before meals & nightly PRN 07/09/24 0007          Hold Oral hypoglycemics while patient is in the hospital.  Adjust insulin as required.     Hypertension  Chronic, controlled. Latest blood pressure and vitals reviewed-     Temp:  [97.3 °F (36.3 °C)-98.1 °F (36.7 °C)]   Pulse:  [66-78]   Resp:  [18-20]   BP: (112-135)/(74-86)   SpO2:  [77 %-97 %] .   Home meds for hypertension were reviewed and noted below.   Hypertension Medications               furosemide (LASIX) 40 MG tablet TAKE 1 TABLET FOUR TIMES DAILY    metoprolol succinate (TOPROL-XL) 25 MG 24 hr tablet Take 1 " tablet (25 mg total) by mouth 2 (two) times daily.    sacubitriL-valsartan (ENTRESTO) 24-26 mg per tablet Take 1 tablet by mouth 2 (two) times daily.            While in the hospital, will manage blood pressure as follows; Continue home antihypertensive regimen    Will utilize p.r.n. blood pressure medication only if patient's blood pressure greater than 180/110 and she develops symptoms such as worsening chest pain or shortness of breath.        VTE Risk Mitigation (From admission, onward)           Ordered     rivaroxaban tablet 20 mg  With dinner         07/09/24 0007     Reason for No Pharmacological VTE Prophylaxis  Once        Question:  Reasons:  Answer:  Already adequately anticoagulated on oral Anticoagulants    07/09/24 0007     IP VTE HIGH RISK PATIENT  Once         07/09/24 0007     Place sequential compression device  Until discontinued         07/09/24 0007                    Discharge Planning   EDWARD: 7/12/2024     Code Status: Full Code   Is the patient medically ready for discharge?:     Reason for patient still in hospital (select all that apply): Patient trending condition  Discharge Plan A: Home                  ISAURO SPRING MD  Department of Hospital Medicine   [facility]

## 2024-07-11 NOTE — SUBJECTIVE & OBJECTIVE
Interval History: Patient seen and examined at the bedside, reports mild improvement. Required a few doses of Ativan for alcohol withdrawal last evening.     Review of Systems   Respiratory:  Positive for cough, shortness of breath and wheezing.    Cardiovascular:  Positive for leg swelling.     Objective:     Vital Signs (Most Recent):  Temp: 97.5 °F (36.4 °C) (07/11/24 1057)  Pulse: 70 (07/11/24 1236)  Resp: 20 (07/11/24 1236)  BP: 132/74 (07/11/24 1057)  SpO2: (!) 77 % (07/11/24 1230) Vital Signs (24h Range):  Temp:  [97.3 °F (36.3 °C)-98.1 °F (36.7 °C)] 97.5 °F (36.4 °C)  Pulse:  [66-78] 70  Resp:  [18-20] 20  SpO2:  [77 %-97 %] 77 %  BP: (112-135)/(74-86) 132/74     Weight: 103 kg (227 lb 1.2 oz)  Body mass index is 36.65 kg/m².    Intake/Output Summary (Last 24 hours) at 7/11/2024 1313  Last data filed at 7/11/2024 1224  Gross per 24 hour   Intake 441.68 ml   Output --   Net 441.68 ml         Physical Exam  Vitals and nursing note reviewed.   Constitutional:       Appearance: She is ill-appearing.      Comments: Drowsy but arousable.    HENT:      Head: Normocephalic and atraumatic.      Mouth/Throat:      Mouth: Mucous membranes are moist.   Eyes:      Extraocular Movements: Extraocular movements intact.      Pupils: Pupils are equal, round, and reactive to light.   Cardiovascular:      Rate and Rhythm: Normal rate and regular rhythm.   Pulmonary:      Effort: Pulmonary effort is normal.      Breath sounds: Wheezing present.      Comments: On 3L NC  Abdominal:      General: Bowel sounds are normal.      Palpations: Abdomen is soft.   Musculoskeletal:         General: Normal range of motion.      Cervical back: Normal range of motion and neck supple.      Right lower leg: Edema present.      Left lower leg: Edema present.   Neurological:      General: No focal deficit present.      Mental Status: She is oriented to person, place, and time. Mental status is at baseline.   Psychiatric:         Mood and Affect:  Mood normal.         Behavior: Behavior normal.             Significant Labs: All pertinent labs within the past 24 hours have been reviewed.    Significant Imaging: I have reviewed all pertinent imaging results/findings within the past 24 hours.

## 2024-07-12 VITALS
RESPIRATION RATE: 18 BRPM | DIASTOLIC BLOOD PRESSURE: 84 MMHG | BODY MASS INDEX: 36.77 KG/M2 | SYSTOLIC BLOOD PRESSURE: 143 MMHG | TEMPERATURE: 98 F | HEIGHT: 66 IN | WEIGHT: 228.81 LBS | OXYGEN SATURATION: 96 % | HEART RATE: 68 BPM

## 2024-07-12 LAB
ALBUMIN SERPL BCP-MCNC: 2.7 G/DL (ref 3.5–5)
ALBUMIN/GLOB SERPL: 0.8 {RATIO}
ALP SERPL-CCNC: 60 U/L (ref 50–130)
ALT SERPL W P-5'-P-CCNC: 17 U/L (ref 13–56)
ANION GAP SERPL CALCULATED.3IONS-SCNC: 7 MMOL/L (ref 7–16)
AST SERPL W P-5'-P-CCNC: 10 U/L (ref 15–37)
BASOPHILS # BLD AUTO: 0 K/UL (ref 0–0.2)
BASOPHILS NFR BLD AUTO: 0 % (ref 0–1)
BILIRUB SERPL-MCNC: 0.6 MG/DL (ref ?–1.2)
BUN SERPL-MCNC: 33 MG/DL (ref 7–18)
BUN/CREAT SERPL: 25 (ref 6–20)
CALCIUM SERPL-MCNC: 9.3 MG/DL (ref 8.5–10.1)
CHLORIDE SERPL-SCNC: 100 MMOL/L (ref 98–107)
CO2 SERPL-SCNC: 38 MMOL/L (ref 21–32)
CREAT SERPL-MCNC: 1.34 MG/DL (ref 0.55–1.02)
DIFFERENTIAL METHOD BLD: ABNORMAL
EGFR (NO RACE VARIABLE) (RUSH/TITUS): 45 ML/MIN/1.73M2
EOSINOPHIL # BLD AUTO: 0.03 K/UL (ref 0–0.5)
EOSINOPHIL NFR BLD AUTO: 0.3 % (ref 1–4)
ERYTHROCYTE [DISTWIDTH] IN BLOOD BY AUTOMATED COUNT: 13.9 % (ref 11.5–14.5)
GLOBULIN SER-MCNC: 3.5 G/DL (ref 2–4)
GLUCOSE SERPL-MCNC: 110 MG/DL (ref 70–105)
GLUCOSE SERPL-MCNC: 144 MG/DL (ref 70–105)
GLUCOSE SERPL-MCNC: 162 MG/DL (ref 74–106)
GLUCOSE SERPL-MCNC: 401 MG/DL (ref 70–105)
GLUCOSE SERPL-MCNC: 499 MG/DL (ref 70–105)
GLUCOSE SERPL-MCNC: 555 MG/DL (ref 70–105)
HCT VFR BLD AUTO: 38.4 % (ref 38–47)
HGB BLD-MCNC: 12.1 G/DL (ref 12–16)
IMM GRANULOCYTES # BLD AUTO: 0.06 K/UL (ref 0–0.04)
IMM GRANULOCYTES NFR BLD: 0.6 % (ref 0–0.4)
LYMPHOCYTES # BLD AUTO: 1.42 K/UL (ref 1–4.8)
LYMPHOCYTES NFR BLD AUTO: 13.9 % (ref 27–41)
MCH RBC QN AUTO: 31.2 PG (ref 27–31)
MCHC RBC AUTO-ENTMCNC: 31.5 G/DL (ref 32–36)
MCV RBC AUTO: 99 FL (ref 80–96)
MONOCYTES # BLD AUTO: 0.79 K/UL (ref 0–0.8)
MONOCYTES NFR BLD AUTO: 7.8 % (ref 2–6)
MPC BLD CALC-MCNC: 10.4 FL (ref 9.4–12.4)
NEUTROPHILS # BLD AUTO: 7.89 K/UL (ref 1.8–7.7)
NEUTROPHILS NFR BLD AUTO: 77.4 % (ref 53–65)
NRBC # BLD AUTO: 0 X10E3/UL
NRBC, AUTO (.00): 0 %
PLATELET # BLD AUTO: 213 K/UL (ref 150–400)
POTASSIUM SERPL-SCNC: 3.5 MMOL/L (ref 3.5–5.1)
PROT SERPL-MCNC: 6.2 G/DL (ref 6.4–8.2)
RBC # BLD AUTO: 3.88 M/UL (ref 4.2–5.4)
SODIUM SERPL-SCNC: 141 MMOL/L (ref 136–145)
WBC # BLD AUTO: 10.19 K/UL (ref 4.5–11)

## 2024-07-12 PROCEDURE — 94761 N-INVAS EAR/PLS OXIMETRY MLT: CPT

## 2024-07-12 PROCEDURE — 80053 COMPREHEN METABOLIC PANEL: CPT | Performed by: INTERNAL MEDICINE

## 2024-07-12 PROCEDURE — 25000003 PHARM REV CODE 250: Performed by: INTERNAL MEDICINE

## 2024-07-12 PROCEDURE — 25000003 PHARM REV CODE 250: Performed by: GENERAL PRACTICE

## 2024-07-12 PROCEDURE — 99900035 HC TECH TIME PER 15 MIN (STAT)

## 2024-07-12 PROCEDURE — 25000242 PHARM REV CODE 250 ALT 637 W/ HCPCS: Performed by: GENERAL PRACTICE

## 2024-07-12 PROCEDURE — 82962 GLUCOSE BLOOD TEST: CPT

## 2024-07-12 PROCEDURE — 36415 COLL VENOUS BLD VENIPUNCTURE: CPT | Performed by: INTERNAL MEDICINE

## 2024-07-12 PROCEDURE — 63600175 PHARM REV CODE 636 W HCPCS: Performed by: INTERNAL MEDICINE

## 2024-07-12 PROCEDURE — 99239 HOSP IP/OBS DSCHRG MGMT >30: CPT | Mod: ,,, | Performed by: INTERNAL MEDICINE

## 2024-07-12 PROCEDURE — 27000221 HC OXYGEN, UP TO 24 HOURS

## 2024-07-12 PROCEDURE — 94640 AIRWAY INHALATION TREATMENT: CPT

## 2024-07-12 PROCEDURE — S4991 NICOTINE PATCH NONLEGEND: HCPCS | Performed by: GENERAL PRACTICE

## 2024-07-12 PROCEDURE — 85025 COMPLETE CBC W/AUTO DIFF WBC: CPT | Performed by: INTERNAL MEDICINE

## 2024-07-12 PROCEDURE — 1111F DSCHRG MED/CURRENT MED MERGE: CPT | Mod: CPTII,,, | Performed by: INTERNAL MEDICINE

## 2024-07-12 RX ORDER — PREDNISONE 20 MG/1
40 TABLET ORAL DAILY
Qty: 10 TABLET | Refills: 0 | Status: SHIPPED | OUTPATIENT
Start: 2024-07-13 | End: 2024-07-18

## 2024-07-12 RX ORDER — FUROSEMIDE 40 MG/1
40 TABLET ORAL DAILY
Qty: 90 TABLET | Refills: 3 | Status: SHIPPED | OUTPATIENT
Start: 2024-07-13 | End: 2024-07-19 | Stop reason: SDUPTHER

## 2024-07-12 RX ADMIN — FUROSEMIDE 40 MG: 40 TABLET ORAL at 09:07

## 2024-07-12 RX ADMIN — METOPROLOL SUCCINATE 25 MG: 25 TABLET, EXTENDED RELEASE ORAL at 09:07

## 2024-07-12 RX ADMIN — LORAZEPAM 2 MG: 1 TABLET ORAL at 09:07

## 2024-07-12 RX ADMIN — INSULIN GLARGINE 30 UNITS: 100 INJECTION, SOLUTION SUBCUTANEOUS at 10:07

## 2024-07-12 RX ADMIN — LEVOFLOXACIN 500 MG: 500 TABLET, FILM COATED ORAL at 09:07

## 2024-07-12 RX ADMIN — PREDNISONE 40 MG: 20 TABLET ORAL at 09:07

## 2024-07-12 RX ADMIN — Medication 100 MG: at 05:07

## 2024-07-12 RX ADMIN — NICOTINE 1 PATCH: 14 PATCH, EXTENDED RELEASE TRANSDERMAL at 09:07

## 2024-07-12 RX ADMIN — CITALOPRAM HYDROBROMIDE 40 MG: 20 TABLET ORAL at 09:07

## 2024-07-12 RX ADMIN — IPRATROPIUM BROMIDE AND ALBUTEROL SULFATE 3 ML: 2.5; .5 SOLUTION RESPIRATORY (INHALATION) at 12:07

## 2024-07-12 RX ADMIN — EZETIMIBE 10 MG: 10 TABLET ORAL at 09:07

## 2024-07-12 RX ADMIN — ACETAMINOPHEN 650 MG: 325 TABLET ORAL at 09:07

## 2024-07-12 RX ADMIN — THERA TABS 1 TABLET: TAB at 09:07

## 2024-07-12 RX ADMIN — ATORVASTATIN CALCIUM 80 MG: 80 TABLET, FILM COATED ORAL at 09:07

## 2024-07-12 RX ADMIN — IPRATROPIUM BROMIDE AND ALBUTEROL SULFATE 3 ML: 2.5; .5 SOLUTION RESPIRATORY (INHALATION) at 07:07

## 2024-07-12 RX ADMIN — POLYETHYLENE GLYCOL 3350 17 G: 17 POWDER, FOR SOLUTION ORAL at 09:07

## 2024-07-12 RX ADMIN — SACUBITRIL AND VALSARTAN 1 TABLET: 24; 26 TABLET, FILM COATED ORAL at 09:07

## 2024-07-12 RX ADMIN — CLOPIDOGREL BISULFATE 75 MG: 75 TABLET ORAL at 09:07

## 2024-07-12 RX ADMIN — FOLIC ACID 1 MG: 1 TABLET ORAL at 09:07

## 2024-07-12 NOTE — PLAN OF CARE
[facility]  [facility]charge Final Note    Primary Care Provider: Anabell Mendez NP    Expected Discharge Date: 7/12/2024    Final Discharge Note (most recent)       Final Note - 07/12/24 1332          Final Note    Assessment Type Final Discharge Note     Anticipated Discharge Disposition Home or Self Care     What phone number can be called within the next 1-3 days to see how you are doing after discharge? [phone]        Post-Acute Status    Coverage [employer]     Discharge Delays None known at this time                     Important Message from Medicare  Important Message from Medicare regarding Discharge Appeal Rights: Given to patient/caregiver, Explained to patient/caregiver, Signed/date by patient/caregiver     Date IMM was signed: 07/12/24  Time IMM was signed: 1100    Contact Info       Anabell Mendez NP   Specialty: Family Medicine   Relationship: PCP - General    [address] [facility] [address]   Phone: [phone]       Next Steps: Schedule an appointment as soon as possible for a visit on 7/19/2024    Instructions: 10:00 am    Ramo Diggs MD   Specialty: Cardiology    [address] [place] [id number]   Phone: [phone]       Next Steps: Schedule an appointment as soon as possible for a visit on 7/31/2024    Instructions: 1:40 pm          Pt to dc home with family. IM updated. No further needs.

## 2024-07-12 NOTE — ASSESSMENT & PLAN NOTE
Patient reports drinking half a pint of alcohol per day for the past 20 years. She denies experiencing withdraw symptoms in the past.  Fall/seizure precautions.   Multivitamins, folic acid and thiamine continued  Ativan PRN- required some doses on 7/9 and 7/10, 7/11.  Much improved symptoms now.    Counseled at length to quit.

## 2024-07-12 NOTE — ASSESSMENT & PLAN NOTE
Chronic, controlled. Latest blood pressure and vitals reviewed-     Temp:  [97.5 °F (36.4 °C)-98.2 °F (36.8 °C)]   Pulse:  [63-93]   Resp:  [18-20]   BP: (133-150)/(74-85)   SpO2:  [77 %-99 %] .   Home meds for hypertension were reviewed and noted below.   Hypertension Medications               furosemide (LASIX) 40 MG tablet TAKE 1 TABLET FOUR TIMES DAILY    metoprolol succinate (TOPROL-XL) 25 MG 24 hr tablet Take 1 tablet (25 mg total) by mouth 2 (two) times daily.    sacubitriL-valsartan (ENTRESTO) 24-26 mg per tablet Take 1 tablet by mouth 2 (two) times daily.            While in the hospital, will manage blood pressure as follows; Continue home antihypertensive regimen    Will utilize p.r.n. blood pressure medication only if patient's blood pressure greater than 180/110 and she develops symptoms such as worsening chest pain or shortness of breath.

## 2024-07-12 NOTE — NURSING
Patient discharged paperwork reviewed, patient acknowledged understanding. Lines removed and patient belongings returned. Patient left floor via wheelchair per transport.

## 2024-07-12 NOTE — ASSESSMENT & PLAN NOTE
Sec to combination of CHF and COPD exacerbation.   Requiring 1-2L NC now (weaned down from 4L).   ABG reviewed on 7/11, consistent with hypoxia, pCO2 56 but pH normal.   Management as below.  Home O2 eval today prior to discharge.

## 2024-07-12 NOTE — DISCHARGE SUMMARY
[facility] Hospital Medicine  Discharge Summary      Patient Name: Kirk Villar  MRN: [id number]  [facility]: [id number]  Patient Class: IP- Inpatient  Admission Date: 7/8/2024  Hospital Length of Stay: 3 days  Discharge Date and Time:  07/12/2024 11:22 AM  Attending Physician: Job Allen MD   Discharging Provider: JOB ALLEN MD  Primary Care Provider: Anabell Mendez NP    Primary Care Team: Networked reference to record PCT     HPI:   Patient is a 59 y/o female with PMHx of COPD/Asthma, CAD, HFrEF (EF 40-45%), DM2, HTN, Factor 5 Leiden, DVT (on Xarelto) and depression who presents to the ED with complaint of SOB, productive cough, increase in abdominal girth and leg swelling that are getting worse for the past few days. Patient reports worsening SOB when laying flat. Patient also reports trying home inhalers and nebulizer earlier in the day with minimum relief. Patient also reports having no appetite for the past 4 days. She states that she had chest pain earlier in the day that lasted a few seconds while having episodes of cough. She is now chest pain free. Patient is known of cardiology, Dr. Diggs. She also sees pulmonology, Dr. Mcdaniels. Patient states that she is cutting down on cigarettes and has been smoking 4 cigarettes per day. She reports drinking half a pint of alcohol per day for the past 20 years and denies experiencing withdraw symptoms in the past. Patient denies any other drug use. Patient denies fever, chills, nausea, vomiting, bowel or urinary habit changes.    In the ED virtal signs showed /68, HR 88, RR 18, SpO2 93% on room air and afebrile. Blood work showed H/H 11.8/37.3, WBC 6.7, , sodium 133, potassium 2.7, BUN/Cr 15/1.3, glucose 395, p-BNP 3100 from 2700 five months ago, troponin 29.2. EKG NSR with PACs, no ST ischemic changes. Chest xray showing B/L pulmonary edema. Patient was given IV lasix, replenished potassium and breathing  treatments. Patient will be admitted to the hospital for further management.    * No surgery found *      Hospital Course:   7/9- No change in respiratory status, continue steroids, antibiotics, nebulization and IV diuresis. Remains on 3L NC.   7/10- Very slow improvement, reduce steroids frequency, continue IV diuresis, nebulization and antibiotics. On 2L NC now.   7/11- Required Ativan based on CIWA score overnight and this morning, monitor closely for DT's. Continue diuresis, steroids, antibiotics (transitioned to PO).   7/12- Improved status, less Ativan required now, counseled on completely quitting alcohol and tobacco, patient understands. Discharge home today on steroids, oral diuretics and close follow up with PCP and primary cardiologist. Will evaluate for home O2 prior to discharge.      Goals of Care Treatment Preferences:  Code Status: Full Code      Consults:     Psychiatric  Alcohol dependence with uncomplicated withdrawal  Patient reports drinking half a pint of alcohol per day for the past 20 years. She denies experiencing withdraw symptoms in the past.  Fall/seizure precautions.   Multivitamins, folic acid and thiamine continued  Ativan PRN- required some doses on 7/9 and 7/10, 7/11.  Much improved symptoms now.    Counseled at length to quit.         Pulmonary  * Acute exacerbation of chronic obstructive pulmonary disease (COPD)  Continues to smoke cigarettes.  Patient's COPD is with exacerbation noted by continued dyspnea, use of accessory muscles for breathing, and worsening of baseline hypoxia currently.    Patient is currently on COPD Pathway.   Continue scheduled inhalers Steroids, Antibiotics, and Supplemental oxygen and monitor respiratory status closely.   Complete due course with steroids and antibiotics and follow up as scheduled with primary pulmonologist.   Follows with Dr. Mcdaniels.     Acute respiratory failure with hypoxia  Sec to combination of CHF and COPD exacerbation.   Requiring  1-2L NC now (weaned down from 4L).   ABG reviewed on 7/11, consistent with hypoxia, pCO2 56 but pH normal.   Management as below.  Home O2 eval today prior to discharge.     Cardiac/Vascular  CAD (coronary artery disease)  Patient with known CAD s/p stent placement, which is controlled Will continue Plavix and Statin and monitor for S/Sx of angina/ACS. Continue to monitor on telemetry.     Hypertension  Chronic, controlled. Latest blood pressure and vitals reviewed-     Temp:  [97.5 °F (36.4 °C)-98.2 °F (36.8 °C)]   Pulse:  [63-93]   Resp:  [18-20]   BP: (133-150)/(74-85)   SpO2:  [77 %-99 %] .   Home meds for hypertension were reviewed and noted below.   Hypertension Medications               furosemide (LASIX) 40 MG tablet TAKE 1 TABLET FOUR TIMES DAILY    metoprolol succinate (TOPROL-XL) 25 MG 24 hr tablet Take 1 tablet (25 mg total) by mouth 2 (two) times daily.    sacubitriL-valsartan (ENTRESTO) 24-26 mg per tablet Take 1 tablet by mouth 2 (two) times daily.            While in the hospital, will manage blood pressure as follows; Continue home antihypertensive regimen    Will utilize p.r.n. blood pressure medication only if patient's blood pressure greater than 180/110 and she develops symptoms such as worsening chest pain or shortness of breath.      Chest pain  Patient had chest pain earlier in the day that lasted a few seconds while having episodes of cough. She is now chest pain free.   Patient is known of cardiology, Dr. Diggs.  EKG NSR with PACs, no ST ischemic changes  Troponin 29.2 --> 21.  Monitor for chest pain, EKG changes or tele events.         Acute on chronic HFrEF (heart failure with reduced ejection fraction)  Patient is hypervolemic with SOB, wheezing and rales on auscultation, JVD and lower extremity 2+ edema.   Home medications include BB, Entresto, farxiga and lasix 40mg BID which she reports taking QID lately.    Patient is identified as having Combined Systolic and Diastolic heart  "failure that is Acute on chronic. CHF is currently uncontrolled due to Rales/crackles on pulmonary exam and Pulmonary edema/pleural effusion on CXR. Latest ECHO performed and demonstrates- Results for orders placed during the hospital encounter of 02/26/24    Echo    Interpretation Summary    Left Ventricle: The left ventricle is normal in size. Mildly increased wall thickness. There is concentric hypertrophy. Regional wall motion abnormalities present. There is mildly reduced systolic function with a visually estimated ejection fraction of 40 - 45%. There is diastolic dysfunction.    Right Ventricle: Normal right ventricular cavity size. Systolic function is normal.    Left Atrium: Left atrium is moderately dilated.    Aortic Valve: The aortic valve is a trileaflet valve.    IVC/SVC: Normal venous pressure at 3 mmHg.  . Continue Beta Blocker, Furosemide (IV transitioned to PO on 7/11), and ARNI and monitor clinical status closely. Monitor on telemetry. Patient is on CHF pathway.  Monitor strict Is&Os and daily weights.  Place on fluid restriction of 1.5 L. Cardiology has not been consulted. Continue to stress to patient importance of self efficacy and  on diet for CHF. Last BNP reviewed- and noted below No results for input(s): "BNP", "BNPTRIAGEBLO" in the last 168 hours.    Hematology  Factor 5 Leiden mutation, heterozygous  Patient with history of factor 5 Leiden mutation and lower extremity DVT.   Continue home xarelto 20mg QD.      Endocrine  Uncontrolled type 2 diabetes mellitus with hyperglycemia  Patient's FSGs are uncontrolled due to hyperglycemia on current medication regimen.  Last A1c reviewed-   Lab Results   Component Value Date    HGBA1C 10.0 (H) 07/09/2024     Most recent fingerstick glucose reviewed- No results for input(s): "POCTGLUCOSE" in the last 24 hours.  Current correctional scale  Low  Maintain anti-hyperglycemic dose as follows-   Antihyperglycemics (From admission, onward)      " Start     Stop Route Frequency Ordered    07/11/24 2100  insulin glargine U-100 (Lantus) injection 30 Units         -- SubQ 2 times daily 07/11/24 1312    07/09/24 0106  insulin aspart U-100 injection 0-5 Units         -- SubQ Before meals & nightly PRN 07/09/24 0007          Hold Oral hypoglycemics while patient is in the hospital.  Adjust insulin as required.     Other  CKD stage 3b, GFR 30-44 ml/min  Creatine stable for now. BMP reviewed- noted Estimated Creatinine Clearance: 54.3 mL/min (A) (based on SCr of 1.34 mg/dL (H)). according to latest data. Based on current GFR, CKD stage is stage 3 - GFR 30-59.    Monitor with diuresis, expect bump in Cr.   Monitor UOP and serial BMP and adjust therapy as needed. Renally dose meds. Avoid nephrotoxic medications and procedures.      Final Active Diagnoses:    Diagnosis Date Noted POA    PRINCIPAL PROBLEM:  Acute exacerbation of chronic obstructive pulmonary disease (COPD) [J44.1] 07/09/2024 Yes    Acute respiratory failure with hypoxia [J96.01] 07/09/2024 Yes    Acute on chronic HFrEF (heart failure with reduced ejection fraction) [I50.23] 07/09/2024 Yes    Alcohol dependence with uncomplicated withdrawal [F10.230] 07/09/2024 Yes    Chest pain [R07.9] 07/09/2024 Yes    CKD stage 3b, GFR 30-44 ml/min [N18.32] 07/11/2024 Yes    Factor 5 Leiden mutation, heterozygous [D68.51] 07/09/2024 Yes    CAD (coronary artery disease) [I25.10] 07/09/2024 Yes    Uncontrolled type 2 diabetes mellitus with hyperglycemia [E11.65] 03/12/2024 Yes    Hypertension [I10] 03/12/2024 Yes      Problems Resolved During this Admission:    Diagnosis Date Noted Date Resolved POA    Hypokalemia [E87.6] 07/09/2024 07/11/2024 Yes    CHF (congestive heart failure) [I50.9] 07/09/2024 07/09/2024 Yes       Discharged Condition: fair    Disposition: Home or Self Care    Follow Up:   Follow-up Information       Anabell Mendez NP. Schedule an appointment as soon as possible for a visit in 1 week(s).     Specialty: Family Medicine  Contact information:  [address]  [facility] [place] [address]  [phone]               Ramo Diggs MD. Schedule an appointment as soon as possible for a visit in 2 week(s).    Specialty: Cardiology  Contact information:  [address] [facility] [place] [address]  [phone]                           Patient Instructions:      Ambulatory referral/consult to Smoking Cessation Program   Standing Status: Future   Referral Priority: Routine Referral Type: Consultation   Referral Reason: Specialty Services Required   Requested Specialty: CTTS   Number of Visits Requested: 1       Significant Diagnostic Studies: Labs: BMP:   Recent Labs   Lab 07/11/24  0454 07/12/24  0506   * 162*    141   K 4.2 3.5   CL 96* 100   CO2 37* 38*   BUN 34* 33*   CREATININE 1.66* 1.34*   CALCIUM 9.2 9.3    and CBC   Recent Labs   Lab 07/11/24  0454 07/12/24  0507   WBC 10.29 10.19   HGB 11.7* 12.1   HCT 38.0 38.4    213       Pending Diagnostic Studies:       None           Medications:  Reconciled Home Medications:      Medication List        START taking these medications      predniSONE 20 MG tablet  Commonly known as: DELTASONE  Take 2 tablets (40 mg total) by mouth once daily. for 5 days  Start taking on: July 13, 2024            CHANGE how you take these medications      furosemide 40 MG tablet  Commonly known as: LASIX  Take 1 tablet (40 mg total) by mouth once daily.  Start taking on: July 13, 2024  What changed:   how much to take  how to take this  when to take this  additional instructions            CONTINUE taking these medications      albuterol 90 mcg/actuation inhaler  Commonly known as: VENTOLIN HFA  Inhale 2 puffs into the lungs every 6 (six) hours as needed for Wheezing. Rescue     blood sugar diagnostic Strp  Commonly known as: ACCU-CHEK GUIDE TEST STRIPS  1 strip by Misc.(Non-Drug; Combo Route) route 3 (three) times daily.     citalopram 40 MG tablet  Commonly known as:  CeleXA  Take 1 tablet (40 mg total) by mouth once daily.     clopidogreL 75 mg tablet  Commonly known as: PLAVIX  Take 1 tablet (75 mg total) by mouth once daily.     cyclobenzaprine 10 MG tablet  Commonly known as: FLEXERIL  Take 1 tablet (10 mg total) by mouth 2 (two) times daily as needed for Muscle spasms.     dapagliflozin propanediol 5 mg Tab tablet  Commonly known as: FARXIGA  Take 1 tablet (5 mg total) by mouth once daily.     docusate sodium 100 MG capsule  Commonly known as: COLACE  Take 200 mg by mouth once daily.     ENTRESTO 24-26 mg per tablet  Generic drug: sacubitriL-valsartan  Take 1 tablet by mouth 2 (two) times daily.     ezetimibe 10 mg tablet  Commonly known as: ZETIA  Take 1 tablet (10 mg total) by mouth once daily.     fluticasone-salmeterol 250-50 mcg/dose 250-50 mcg/dose diskus inhaler  Commonly known as: ADVAIR DISKUS  Inhale 1 puff into the lungs 2 (two) times daily. Controller     gabapentin 800 MG tablet  Commonly known as: NEURONTIN  Take 1 tablet (800 mg total) by mouth 4 (four) times daily.     insulin aspart U-100 100 unit/mL (3 mL) Inpn pen  Commonly known as: NovoLOG Flexpen U-100 Insulin  Inject 5-10 minutes before meals per sliding scale     insulin degludec 100 unit/mL (3 mL) insulin pen  Commonly known as: TRESIBA FLEXTOUCH U-100  Inject 20 Units into the skin every evening.     metoprolol succinate 25 MG 24 hr tablet  Commonly known as: TOPROL-XL  Take 1 tablet (25 mg total) by mouth 2 (two) times daily.     nicotine (polacrilex) 2 mg Gum  Commonly known as: NICORETTE  Take 1 each (2 mg total) by mouth as needed.     nicotine 7 mg/24 hr  Commonly known as: NICODERM CQ  Place 1 patch onto the skin once daily.     rivaroxaban 20 mg Tab  Commonly known as: XARELTO  Take 1 tablet (20 mg total) by mouth daily with dinner or evening meal.     rosuvastatin 20 MG tablet  Commonly known as: CRESTOR  Take 1 tablet (20 mg total) by mouth once daily.     traZODone 100 MG tablet  Commonly  known as: DESYREL  Take 1 tablet (100 mg total) by mouth every evening.              Indwelling Lines/Drains at time of discharge:   Lines/Drains/Airways       None                   Time spent on the discharge of patient: 37 minutes         ISAURO SPRING MD  Department of Hospital Medicine  [facility] - [facility]

## 2024-07-12 NOTE — PLAN OF CARE
Problem: Breathing Pattern Ineffective  Goal: Effective Breathing Pattern  Outcome: Met     Problem: Gas Exchange Impaired  Goal: Optimal Gas Exchange  Outcome: Met     Problem: Airway Clearance Ineffective  Goal: Effective Airway Clearance  Outcome: Met     Problem: Breathing Pattern Ineffective  Goal: Effective Breathing Pattern  Outcome: Met     Problem: Gas Exchange Impaired  Goal: Optimal Gas Exchange  Outcome: Met     Problem: Airway Clearance Ineffective  Goal: Effective Airway Clearance  Outcome: Met

## 2024-07-12 NOTE — ASSESSMENT & PLAN NOTE
"Patient is hypervolemic with SOB, wheezing and rales on auscultation, JVD and lower extremity 2+ edema.   Home medications include BB, Entresto, farxiga and lasix 40mg BID which she reports taking QID lately.    Patient is identified as having Combined Systolic and Diastolic heart failure that is Acute on chronic. CHF is currently uncontrolled due to Rales/crackles on pulmonary exam and Pulmonary edema/pleural effusion on CXR. Latest ECHO performed and demonstrates- Results for orders placed during the hospital encounter of 02/26/24    Echo    Interpretation Summary    Left Ventricle: The left ventricle is normal in size. Mildly increased wall thickness. There is concentric hypertrophy. Regional wall motion abnormalities present. There is mildly reduced systolic function with a visually estimated ejection fraction of 40 - 45%. There is diastolic dysfunction.    Right Ventricle: Normal right ventricular cavity size. Systolic function is normal.    Left Atrium: Left atrium is moderately dilated.    Aortic Valve: The aortic valve is a trileaflet valve.    IVC/SVC: Normal venous pressure at 3 mmHg.  . Continue Beta Blocker, Furosemide (IV transitioned to PO on 7/11), and ARNI and monitor clinical status closely. Monitor on telemetry. Patient is on CHF pathway.  Monitor strict Is&Os and daily weights.  Place on fluid restriction of 1.5 L. Cardiology has not been consulted. Continue to stress to patient importance of self efficacy and  on diet for CHF. Last BNP reviewed- and noted below No results for input(s): "BNP", "BNPTRIAGEBLO" in the last 168 hours.  "

## 2024-07-12 NOTE — ASSESSMENT & PLAN NOTE
Creatine stable for now. BMP reviewed- noted Estimated Creatinine Clearance: 54.3 mL/min (A) (based on SCr of 1.34 mg/dL (H)). according to latest data. Based on current GFR, CKD stage is stage 3 - GFR 30-59.    Monitor with diuresis, expect bump in Cr.   Monitor UOP and serial BMP and adjust therapy as needed. Renally dose meds. Avoid nephrotoxic medications and procedures.

## 2024-07-15 ENCOUNTER — PATIENT OUTREACH (OUTPATIENT)
Dept: ADMINISTRATIVE | Facility: CLINIC | Age: 59
End: 2024-07-15

## 2024-07-15 NOTE — PROGRESS NOTES
C3 nurse attempted to contact Kirkclarence Villar  for a TCC post hospital discharge follow up call. No answer. Left voicemail with callback information. The patient has a scheduled HOSFU appointment with Anabell Mendez NP  on 7/19/24 @ 1000.

## 2024-07-19 ENCOUNTER — OFFICE VISIT (OUTPATIENT)
Dept: FAMILY MEDICINE | Facility: CLINIC | Age: 59
End: 2024-07-19
Payer: MEDICARE

## 2024-07-19 VITALS
WEIGHT: 225 LBS | HEART RATE: 84 BPM | BODY MASS INDEX: 36.16 KG/M2 | SYSTOLIC BLOOD PRESSURE: 118 MMHG | OXYGEN SATURATION: 96 % | DIASTOLIC BLOOD PRESSURE: 70 MMHG | TEMPERATURE: 97 F | HEIGHT: 66 IN | RESPIRATION RATE: 20 BRPM

## 2024-07-19 DIAGNOSIS — I50.9 ACC/AHA STAGE C CONGESTIVE HEART FAILURE DUE TO ISCHEMIC CARDIOMYOPATHY: ICD-10-CM

## 2024-07-19 DIAGNOSIS — I25.5 ACC/AHA STAGE C CONGESTIVE HEART FAILURE DUE TO ISCHEMIC CARDIOMYOPATHY: ICD-10-CM

## 2024-07-19 DIAGNOSIS — I10 PRIMARY HYPERTENSION: ICD-10-CM

## 2024-07-19 DIAGNOSIS — J44.9 CHRONIC OBSTRUCTIVE PULMONARY DISEASE, UNSPECIFIED COPD TYPE: Primary | ICD-10-CM

## 2024-07-19 DIAGNOSIS — Z79.4 TYPE 2 DIABETES MELLITUS WITH HYPERGLYCEMIA, WITH LONG-TERM CURRENT USE OF INSULIN: ICD-10-CM

## 2024-07-19 DIAGNOSIS — E78.2 MIXED HYPERLIPIDEMIA: ICD-10-CM

## 2024-07-19 DIAGNOSIS — I82.5Y9 CHRONIC DEEP VEIN THROMBOSIS (DVT) OF PROXIMAL VEIN OF LOWER EXTREMITY, UNSPECIFIED LATERALITY: ICD-10-CM

## 2024-07-19 DIAGNOSIS — F33.9 EPISODE OF RECURRENT MAJOR DEPRESSIVE DISORDER, UNSPECIFIED DEPRESSION EPISODE SEVERITY: ICD-10-CM

## 2024-07-19 DIAGNOSIS — F41.1 GENERALIZED ANXIETY DISORDER: ICD-10-CM

## 2024-07-19 DIAGNOSIS — E11.65 TYPE 2 DIABETES MELLITUS WITH HYPERGLYCEMIA, WITH LONG-TERM CURRENT USE OF INSULIN: ICD-10-CM

## 2024-07-19 PROCEDURE — 4010F ACE/ARB THERAPY RXD/TAKEN: CPT | Mod: CPTII,,, | Performed by: NURSE PRACTITIONER

## 2024-07-19 PROCEDURE — 3074F SYST BP LT 130 MM HG: CPT | Mod: CPTII,,, | Performed by: NURSE PRACTITIONER

## 2024-07-19 PROCEDURE — 3046F HEMOGLOBIN A1C LEVEL >9.0%: CPT | Mod: CPTII,,, | Performed by: NURSE PRACTITIONER

## 2024-07-19 PROCEDURE — 3078F DIAST BP <80 MM HG: CPT | Mod: CPTII,,, | Performed by: NURSE PRACTITIONER

## 2024-07-19 PROCEDURE — 3008F BODY MASS INDEX DOCD: CPT | Mod: CPTII,,, | Performed by: NURSE PRACTITIONER

## 2024-07-19 PROCEDURE — 99213 OFFICE O/P EST LOW 20 MIN: CPT | Mod: ,,, | Performed by: NURSE PRACTITIONER

## 2024-07-19 RX ORDER — TRAZODONE HYDROCHLORIDE 100 MG/1
100 TABLET ORAL NIGHTLY
Qty: 90 TABLET | Refills: 2 | Status: SHIPPED | OUTPATIENT
Start: 2024-07-19 | End: 2024-09-04

## 2024-07-19 RX ORDER — SACUBITRIL AND VALSARTAN 24; 26 MG/1; MG/1
1 TABLET, FILM COATED ORAL 2 TIMES DAILY
Qty: 180 TABLET | Refills: 1 | Status: SHIPPED | OUTPATIENT
Start: 2024-07-19

## 2024-07-19 RX ORDER — CLOPIDOGREL BISULFATE 75 MG/1
75 TABLET ORAL DAILY
Qty: 90 TABLET | Refills: 2 | Status: SHIPPED | OUTPATIENT
Start: 2024-07-19 | End: 2024-09-04

## 2024-07-19 RX ORDER — DOCUSATE SODIUM 100 MG/1
200 CAPSULE, LIQUID FILLED ORAL DAILY
Qty: 60 CAPSULE | Refills: 2 | Status: SHIPPED | OUTPATIENT
Start: 2024-07-19

## 2024-07-19 RX ORDER — ROSUVASTATIN CALCIUM 20 MG/1
20 TABLET, COATED ORAL DAILY
Qty: 90 TABLET | Refills: 1 | Status: SHIPPED | OUTPATIENT
Start: 2024-07-19 | End: 2025-07-19

## 2024-07-19 RX ORDER — FUROSEMIDE 40 MG/1
40 TABLET ORAL DAILY
Qty: 90 TABLET | Refills: 3 | Status: SHIPPED | OUTPATIENT
Start: 2024-07-19 | End: 2025-07-19

## 2024-07-19 RX ORDER — INSULIN DEGLUDEC 100 U/ML
20 INJECTION, SOLUTION SUBCUTANEOUS NIGHTLY
Qty: 6 ML | Refills: 11 | Status: SHIPPED | OUTPATIENT
Start: 2024-07-19 | End: 2025-07-19

## 2024-07-19 RX ORDER — ALBUTEROL SULFATE 90 UG/1
2 INHALANT RESPIRATORY (INHALATION) EVERY 6 HOURS PRN
Qty: 18 G | Refills: 2 | Status: SHIPPED | OUTPATIENT
Start: 2024-07-19 | End: 2025-07-19

## 2024-07-19 RX ORDER — METOPROLOL SUCCINATE 25 MG/1
25 TABLET, EXTENDED RELEASE ORAL 2 TIMES DAILY
Qty: 180 TABLET | Refills: 2 | Status: SHIPPED | OUTPATIENT
Start: 2024-07-19

## 2024-07-19 RX ORDER — CITALOPRAM 40 MG/1
40 TABLET, FILM COATED ORAL DAILY
Qty: 90 TABLET | Refills: 2 | Status: SHIPPED | OUTPATIENT
Start: 2024-07-19 | End: 2024-09-04

## 2024-07-19 RX ORDER — EZETIMIBE 10 MG/1
10 TABLET ORAL DAILY
Qty: 90 TABLET | Refills: 2 | Status: SHIPPED | OUTPATIENT
Start: 2024-07-19

## 2024-07-19 RX ORDER — DAPAGLIFLOZIN 5 MG/1
5 TABLET, FILM COATED ORAL DAILY
Qty: 90 TABLET | Refills: 1 | Status: SHIPPED | OUTPATIENT
Start: 2024-07-19

## 2024-07-19 RX ORDER — MUPIROCIN 20 MG/G
OINTMENT TOPICAL 2 TIMES DAILY
Qty: 15 G | Refills: 2 | Status: SHIPPED | OUTPATIENT
Start: 2024-07-19

## 2024-07-19 RX ORDER — GABAPENTIN 800 MG/1
800 TABLET ORAL 4 TIMES DAILY
Qty: 120 TABLET | Refills: 2 | Status: SHIPPED | OUTPATIENT
Start: 2024-07-19 | End: 2024-10-17

## 2024-07-22 ENCOUNTER — TELEPHONE (OUTPATIENT)
Dept: CARDIOLOGY | Facility: CLINIC | Age: 59
End: 2024-07-22
Payer: MEDICARE

## 2024-07-22 NOTE — TELEPHONE ENCOUNTER
Called and got patient's appointment changed to 7/24/24 at 2:40. Patient confirmed to bring an updated list or bottles of medication.   -HW

## 2024-07-22 NOTE — TELEPHONE ENCOUNTER
----- Message from Deirdre Gary sent at 7/22/2024  9:44 AM CDT -----  Patient called needing to speak to someone about getting her appointment moved up to an earlier date. A good phone number to reach her is  [phone]

## 2024-07-24 ENCOUNTER — OFFICE VISIT (OUTPATIENT)
Dept: CARDIOLOGY | Facility: CLINIC | Age: 59
End: 2024-07-24
Payer: MEDICARE

## 2024-07-24 VITALS
BODY MASS INDEX: 36.06 KG/M2 | SYSTOLIC BLOOD PRESSURE: 132 MMHG | HEART RATE: 77 BPM | DIASTOLIC BLOOD PRESSURE: 80 MMHG | WEIGHT: 224.38 LBS | OXYGEN SATURATION: 95 % | HEIGHT: 66 IN

## 2024-07-24 DIAGNOSIS — I50.23 ACUTE ON CHRONIC HFREF (HEART FAILURE WITH REDUCED EJECTION FRACTION): ICD-10-CM

## 2024-07-24 DIAGNOSIS — S81.802A NON-HEALING WOUND OF LEFT LOWER EXTREMITY: Primary | ICD-10-CM

## 2024-07-24 DIAGNOSIS — L03.116 CELLULITIS OF LEFT LOWER EXTREMITY: ICD-10-CM

## 2024-07-24 PROCEDURE — 1111F DSCHRG MED/CURRENT MED MERGE: CPT | Mod: CPTII,,, | Performed by: HOSPITALIST

## 2024-07-24 PROCEDURE — 4010F ACE/ARB THERAPY RXD/TAKEN: CPT | Mod: CPTII,,, | Performed by: HOSPITALIST

## 2024-07-24 PROCEDURE — 3079F DIAST BP 80-89 MM HG: CPT | Mod: CPTII,,, | Performed by: HOSPITALIST

## 2024-07-24 PROCEDURE — 99999 PR PBB SHADOW E&M-EST. PATIENT-LVL V: CPT | Mod: PBBFAC,,, | Performed by: HOSPITALIST

## 2024-07-24 PROCEDURE — 3075F SYST BP GE 130 - 139MM HG: CPT | Mod: CPTII,,, | Performed by: HOSPITALIST

## 2024-07-24 PROCEDURE — 1159F MED LIST DOCD IN RCRD: CPT | Mod: CPTII,,, | Performed by: HOSPITALIST

## 2024-07-24 PROCEDURE — 99215 OFFICE O/P EST HI 40 MIN: CPT | Mod: PBBFAC | Performed by: HOSPITALIST

## 2024-07-24 PROCEDURE — 99204 OFFICE O/P NEW MOD 45 MIN: CPT | Mod: S$PBB,,, | Performed by: HOSPITALIST

## 2024-07-24 PROCEDURE — 87070 CULTURE OTHR SPECIMN AEROBIC: CPT | Mod: ,,, | Performed by: CLINICAL MEDICAL LABORATORY

## 2024-07-24 PROCEDURE — 3008F BODY MASS INDEX DOCD: CPT | Mod: CPTII,,, | Performed by: HOSPITALIST

## 2024-07-24 PROCEDURE — 3046F HEMOGLOBIN A1C LEVEL >9.0%: CPT | Mod: CPTII,,, | Performed by: HOSPITALIST

## 2024-07-24 RX ORDER — SULFAMETHOXAZOLE AND TRIMETHOPRIM 800; 160 MG/1; MG/1
2 TABLET ORAL 2 TIMES DAILY
Qty: 28 TABLET | Refills: 0 | Status: SHIPPED | OUTPATIENT
Start: 2024-07-24 | End: 2024-07-31

## 2024-07-24 NOTE — PROGRESS NOTES
CARDIOVASCULAR CONSULTATION        REASON FOR CONSULT:   Kirk Villar is a 58 y.o. female who presents for   Chief Complaint   Patient presents with    Edema     Stomach, legs, ankles, and feet swollen since hospitalization but doesn't go down with elevation and medication    Shortness of Breath     With exertion and while laying completely flat    Chest Pain     Sharp pains in left side of chest and under breast while sitting down          HISTORY OF PRESENT ILLNESS:   58 y.o. female who  has a past medical history of Depression, Diabetes mellitus, type 2, DVT (deep venous thrombosis), and Factor 5 Leiden mutation, heterozygous.    Today we discussed the patient's heart failure, lower extremity swelling, and non-healing traumatic wound of LLE that she sustained after scraping on mop bucket; has been using mupirocin, still appears purulent w/ erythema and prominence of the inferior border. See assessment and plan.     PAST MEDICAL HISTORY:     Past Medical History:   Diagnosis Date    Depression     Diabetes mellitus, type 2     DVT (deep venous thrombosis)     Factor 5 Leiden mutation, heterozygous        PAST SURGICAL HISTORY:   History reviewed. No pertinent surgical history.    ALLERGIES AND MEDICATION:   Review of patient's allergies indicates:  No Known Allergies     Medication List            Accurate as of July 24, 2024  4:06 PM. If you have any questions, ask your nurse or doctor.                START taking these medications      sulfamethoxazole-trimethoprim 800-160mg 800-160 mg Tab  Commonly known as: BACTRIM DS  Take 2 tablets by mouth 2 (two) times daily. for 7 days  Started by: Ramo Diggs MD            CONTINUE taking these medications      albuterol 90 mcg/actuation inhaler  Commonly known as: VENTOLIN HFA  Inhale 2 puffs into the lungs every 6 (six) hours as needed for Wheezing. Rescue     blood sugar diagnostic Strp  Commonly known as: ACCU-CHEK GUIDE TEST STRIPS  1 strip by  Misc.(Non-Drug; Combo Route) route 3 (three) times daily.     citalopram 40 MG tablet  Commonly known as: CeleXA  Take 1 tablet (40 mg total) by mouth once daily.     clopidogreL 75 mg tablet  Commonly known as: PLAVIX  Take 1 tablet (75 mg total) by mouth once daily.     cyclobenzaprine 10 MG tablet  Commonly known as: FLEXERIL  Take 1 tablet (10 mg total) by mouth 2 (two) times daily as needed for Muscle spasms.     dapagliflozin propanediol 5 mg Tab tablet  Commonly known as: FARXIGA  Take 1 tablet (5 mg total) by mouth once daily.     docusate sodium 100 MG capsule  Commonly known as: COLACE  Take 2 capsules (200 mg total) by mouth once daily.     ENTRESTO 24-26 mg per tablet  Generic drug: sacubitriL-valsartan  Take 1 tablet by mouth 2 (two) times daily.     ezetimibe 10 mg tablet  Commonly known as: ZETIA  Take 1 tablet (10 mg total) by mouth once daily.     fluticasone-salmeterol 250-50 mcg/dose 250-50 mcg/dose diskus inhaler  Commonly known as: ADVAIR DISKUS  Inhale 1 puff into the lungs 2 (two) times daily. Controller     furosemide 40 MG tablet  Commonly known as: LASIX  Take 1 tablet (40 mg total) by mouth once daily.     gabapentin 800 MG tablet  Commonly known as: NEURONTIN  Take 1 tablet (800 mg total) by mouth 4 (four) times daily.     insulin aspart U-100 100 unit/mL (3 mL) Inpn pen  Commonly known as: NovoLOG Flexpen U-100 Insulin  Inject 5-10 minutes before meals per sliding scale     insulin degludec 100 unit/mL (3 mL) insulin pen  Commonly known as: TRESIBA FLEXTOUCH U-100  Inject 20 Units into the skin every evening.     metoprolol succinate 25 MG 24 hr tablet  Commonly known as: TOPROL-XL  Take 1 tablet (25 mg total) by mouth 2 (two) times daily.     mupirocin 2 % ointment  Commonly known as: BACTROBAN  Apply topically 2 (two) times daily.     nicotine (polacrilex) 2 mg Gum  Commonly known as: NICORETTE  Take 1 each (2 mg total) by mouth as needed.     nicotine 7 mg/24 hr  Commonly known as:  NICODERM CQ  Place 1 patch onto the skin once daily.     rivaroxaban 20 mg Tab  Commonly known as: XARELTO  Take 1 tablet (20 mg total) by mouth daily with dinner or evening meal.     rosuvastatin 20 MG tablet  Commonly known as: CRESTOR  Take 1 tablet (20 mg total) by mouth once daily.     traZODone 100 MG tablet  Commonly known as: DESYREL  Take 1 tablet (100 mg total) by mouth every evening.               Where to Get Your Medications        These medications were sent to [facility] [address] [place] [address] [address] [place] [address]  [address] [place] [address] [place] [address]      Phone: [phone]   sulfamethoxazole-trimethoprim 800-160mg 800-160 mg Tab         SOCIAL HISTORY:     Social History     Socioeconomic History    Marital status: Unknown   Tobacco Use    Smoking status: Every Day     Average packs/day: 0.5 packs/day for 49.0 years (24.5 ttl pk-yrs)     Types: Cigarettes     Start date: 1975    Smokeless tobacco: Never    Tobacco comments:     Patient states 4 singles a day   Substance and Sexual Activity    Alcohol use: Yes     Comment: occasionally    Drug use: Never    Sexual activity: Not Currently     Social Determinants of Health     Financial Resource Strain: Low Risk  (7/9/2024)    Overall Financial Resource Strain (CARDIA)     Difficulty of Paying Living Expenses: Not hard at all   Food Insecurity: No Food Insecurity (7/9/2024)    Hunger Vital Sign     Worried About Running Out of Food in the Last Year: Never true     Ran Out of Food in the Last Year: Never true   Transportation Needs: No Transportation Needs (7/9/2024)    TRANSPORTATION NEEDS     Transportation : No   Physical Activity: Inactive (7/9/2024)    Exercise Vital Sign     Days of Exercise per Week: 0 days     Minutes of Exercise per Session: 0 min   Stress: No Stress Concern Present (7/9/2024)    [employer] - Occupational Stress Questionnaire     Feeling of Stress : Not at all   Housing Stability: Low  "Risk  (7/9/2024)    Housing Stability Vital Sign     Unable to Pay for Housing in the Last Year: No     Homeless in the Last Year: No       FAMILY HISTORY:     Family History   Problem Relation Name Age of Onset    Cancer Mother      Heart failure Father         REVIEW OF SYSTEMS:       Cardiology focused 12-point ROS otherwise unremarkable except for as mentioned in HPI and A/P.   Pertinent Positives and Negatives documented herein.       PHYSICAL EXAM:     Vitals:    07/24/24 1522   BP: 132/80   Pulse: 77    Body mass index is 36.22 kg/m².  Weight: 101.8 kg (224 lb 6.4 oz)   Height: 5' 6" (167.6 cm)     Gen: NAD  HEENT: NC/AT, MMM  Chest: normal wob  CV: RRR, no m/g/r  Ext: 2+ pitting edema of BLEs; 4cm x 6cm wound of LLE (lateral shin); pulses 1+ DP/PT bilaterally   Skin: no rash  Psych: AMAA  Neuro: [facility]NGI      DATA:     Laboratory:  CBC:  Recent Labs   Lab 07/10/24  0359 07/11/24  0454 07/12/24  0507   WBC 9.18 10.29 10.19   Hemoglobin 11.9 L 11.7 L 12.1   Hematocrit 37.6 L 38.0 38.4   Platelet Count 220 223 213       CHEMISTRIES:  Recent Labs   Lab 07/10/24  0359 07/11/24  0454 07/12/24  0506   Glucose 397 H 380 H 162 H   Sodium 135 L 138 141   Potassium 4.5 4.2 3.5   BUN 28 H 34 H 33 H   Creatinine 1.45 H 1.66 H 1.34 H   Calcium 8.9 9.2 9.3   Magnesium 2.0  --   --        CARDIAC BIOMARKERS:      No results found for: "BNP"    COAGS:        LIPIDS/LFTS:  Recent Labs   Lab 11/30/22  1400 07/08/24  2040 07/12/24  0506   Cholesterol 348 H  --   --    Triglycerides 449 H  --   --    HDL Cholesterol 45  --   --    Non-  --   --    AST 8 L 32 10 L   ALT 20 25 17       Hemoglobin A1C   Date Value Ref Range Status   07/09/2024 10.0 (H) 4.5 - 6.6 % Final     Comment:       Normal:               <5.7%  Pre-Diabetic:       5.7% to 6.4%  Diabetic:             >6.4%  Diabetic Goal:     <7%   11/30/2022 11.6 (H) 4.5 - 6.6 % Final     Comment:       Normal:               <5.7%  Pre-Diabetic:       5.7% to " 6.4%  Diabetic:             >6.4%  Diabetic Goal:     <7%       TSH  Recent Labs   Lab 11/30/22  1400   TSH 0.361       The ASCVD Risk score (Laura DK, et al., 2019) failed to calculate for the following reasons:    The valid total cholesterol range is 130 to 320 mg/dL     IMAGING  No orders to display       ASSESSMENT AND PLAN     Patient Active Problem List   Diagnosis    Heart failure with mid-range ejection fraction (HFmEF)    ACE inhibitor intolerance    Hypertension    Uncontrolled type 2 diabetes mellitus with hyperglycemia    ACC/AHA stage C congestive heart failure due to ischemic cardiomyopathy    Familial hyperlipidemia    Acute exacerbation of chronic obstructive pulmonary disease (COPD)    Acute on chronic HFrEF (heart failure with reduced ejection fraction)    Factor 5 Leiden mutation, heterozygous    Alcohol dependence with uncomplicated withdrawal    Chest pain    CAD (coronary artery disease)    Acute respiratory failure with hypoxia    CKD stage 3b, GFR 30-44 ml/min         Orders Placed This Encounter   Procedures    Culture, Wound    Ambulatory referral/consult to Wound Clinic    EKG 12-lead       1. Acute on chronic HFrEF (heart failure with reduced ejection fraction)  - EKG 12-lead; Future  - EKG 12-lead    2. Non-healing wound of left lower extremity  - Culture, Wound  - Ambulatory referral/consult to Wound Clinic; Future    3. Cellulitis of left lower extremity  - Ambulatory referral/consult to Wound Clinic; Future    #AoC HFrEF - mild exacerbation w/ persistent LE and abdominal swelling, discussed uptitration of home lasix for next 3-5 days; patient agreeable; unclear if PAD as well - will discuss claudication symptoms etc at next visit    #Cellulitis of LLE  -Bactrim DS rx sent to [facility] (2 tabs BID for 7 days)  -wound cultured, sent to lab    #Non-healing wound of LLE  -referral to wound care    Follow-up in 1 mo for HFrEF management - defer titration of GDMT to next visit given  infection 2/2 traumatic injury refractory to topical mupirocin (scraped on mop bucket)          This note was dictated with the help of speech recognition software.  There might be un-intended errors and/or substitutions.

## 2024-07-24 NOTE — PATIENT INSTRUCTIONS
-referred to wound care: they will call with an appointment  -wound culture sent off  -follow up on 8/27/24 with Dr. Diggs  -start antibiotics

## 2024-07-26 LAB — MICROORGANISM SPEC CULT: NORMAL

## 2024-07-29 RX ORDER — CYCLOBENZAPRINE HCL 10 MG
TABLET ORAL
Qty: 60 TABLET | Refills: 11 | Status: SHIPPED | OUTPATIENT
Start: 2024-07-29

## 2024-08-08 ENCOUNTER — OFFICE VISIT (OUTPATIENT)
Dept: FAMILY MEDICINE | Facility: CLINIC | Age: 59
End: 2024-08-08
Payer: MEDICARE

## 2024-08-08 VITALS
HEART RATE: 81 BPM | RESPIRATION RATE: 18 BRPM | HEIGHT: 66 IN | OXYGEN SATURATION: 93 % | BODY MASS INDEX: 34.72 KG/M2 | DIASTOLIC BLOOD PRESSURE: 70 MMHG | WEIGHT: 216 LBS | TEMPERATURE: 98 F | SYSTOLIC BLOOD PRESSURE: 120 MMHG

## 2024-08-08 DIAGNOSIS — E78.2 MIXED HYPERLIPIDEMIA: ICD-10-CM

## 2024-08-08 DIAGNOSIS — I50.9 ACC/AHA STAGE C CONGESTIVE HEART FAILURE DUE TO ISCHEMIC CARDIOMYOPATHY: ICD-10-CM

## 2024-08-08 DIAGNOSIS — M54.50 ACUTE BILATERAL LOW BACK PAIN WITHOUT SCIATICA: ICD-10-CM

## 2024-08-08 DIAGNOSIS — Z00.00 ROUTINE CHECK-UP: ICD-10-CM

## 2024-08-08 DIAGNOSIS — Z79.4 TYPE 2 DIABETES MELLITUS WITH HYPERGLYCEMIA, WITH LONG-TERM CURRENT USE OF INSULIN: Primary | ICD-10-CM

## 2024-08-08 DIAGNOSIS — E11.65 TYPE 2 DIABETES MELLITUS WITH HYPERGLYCEMIA, WITH LONG-TERM CURRENT USE OF INSULIN: Primary | ICD-10-CM

## 2024-08-08 DIAGNOSIS — B37.31 CANDIDIASIS OF GENITALIA IN FEMALE: ICD-10-CM

## 2024-08-08 DIAGNOSIS — J44.9 CHRONIC OBSTRUCTIVE PULMONARY DISEASE, UNSPECIFIED COPD TYPE: ICD-10-CM

## 2024-08-08 DIAGNOSIS — I10 PRIMARY HYPERTENSION: ICD-10-CM

## 2024-08-08 DIAGNOSIS — I25.5 ACC/AHA STAGE C CONGESTIVE HEART FAILURE DUE TO ISCHEMIC CARDIOMYOPATHY: ICD-10-CM

## 2024-08-08 LAB
ALBUMIN SERPL BCP-MCNC: 3.2 G/DL (ref 3.5–5)
ALBUMIN/GLOB SERPL: 0.8 {RATIO}
ALP SERPL-CCNC: 79 U/L (ref 50–130)
ALT SERPL W P-5'-P-CCNC: 19 U/L (ref 13–56)
ANION GAP SERPL CALCULATED.3IONS-SCNC: 11 MMOL/L (ref 7–16)
AST SERPL W P-5'-P-CCNC: 19 U/L (ref 15–37)
BASOPHILS # BLD AUTO: 0.07 K/UL (ref 0–0.2)
BASOPHILS NFR BLD AUTO: 1.3 % (ref 0–1)
BILIRUB SERPL-MCNC: 0.4 MG/DL (ref ?–1.2)
BUN SERPL-MCNC: 16 MG/DL (ref 7–18)
BUN/CREAT SERPL: 9 (ref 6–20)
CALCIUM SERPL-MCNC: 9.2 MG/DL (ref 8.5–10.1)
CHLORIDE SERPL-SCNC: 100 MMOL/L (ref 98–107)
CHOLEST SERPL-MCNC: 192 MG/DL (ref 0–200)
CHOLEST/HDLC SERPL: 3.1 {RATIO}
CO2 SERPL-SCNC: 31 MMOL/L (ref 21–32)
CREAT SERPL-MCNC: 1.85 MG/DL (ref 0.55–1.02)
DIFFERENTIAL METHOD BLD: ABNORMAL
EGFR (NO RACE VARIABLE) (RUSH/TITUS): 31 ML/MIN/1.73M2
EOSINOPHIL # BLD AUTO: 0.51 K/UL (ref 0–0.5)
EOSINOPHIL NFR BLD AUTO: 9.1 % (ref 1–4)
ERYTHROCYTE [DISTWIDTH] IN BLOOD BY AUTOMATED COUNT: 14 % (ref 11.5–14.5)
GLOBULIN SER-MCNC: 4 G/DL (ref 2–4)
GLUCOSE SERPL-MCNC: 177 MG/DL (ref 74–106)
HCT VFR BLD AUTO: 39.3 % (ref 38–47)
HDLC SERPL-MCNC: 61 MG/DL (ref 40–60)
HGB BLD-MCNC: 12.5 G/DL (ref 12–16)
IMM GRANULOCYTES # BLD AUTO: 0.01 K/UL (ref 0–0.04)
IMM GRANULOCYTES NFR BLD: 0.2 % (ref 0–0.4)
LDLC SERPL CALC-MCNC: 91 MG/DL
LDLC/HDLC SERPL: 1.5 {RATIO}
LYMPHOCYTES # BLD AUTO: 1.12 K/UL (ref 1–4.8)
LYMPHOCYTES NFR BLD AUTO: 20.1 % (ref 27–41)
MCH RBC QN AUTO: 31.3 PG (ref 27–31)
MCHC RBC AUTO-ENTMCNC: 31.8 G/DL (ref 32–36)
MCV RBC AUTO: 98.5 FL (ref 80–96)
MONOCYTES # BLD AUTO: 0.53 K/UL (ref 0–0.8)
MONOCYTES NFR BLD AUTO: 9.5 % (ref 2–6)
MPC BLD CALC-MCNC: 10 FL (ref 9.4–12.4)
NEUTROPHILS # BLD AUTO: 3.34 K/UL (ref 1.8–7.7)
NEUTROPHILS NFR BLD AUTO: 59.8 % (ref 53–65)
NONHDLC SERPL-MCNC: 131 MG/DL
NRBC # BLD AUTO: 0 X10E3/UL
NRBC, AUTO (.00): 0 %
PLATELET # BLD AUTO: 275 K/UL (ref 150–400)
POTASSIUM SERPL-SCNC: 4.4 MMOL/L (ref 3.5–5.1)
PROT SERPL-MCNC: 7.2 G/DL (ref 6.4–8.2)
RBC # BLD AUTO: 3.99 M/UL (ref 4.2–5.4)
SODIUM SERPL-SCNC: 138 MMOL/L (ref 136–145)
TRIGL SERPL-MCNC: 201 MG/DL (ref 35–150)
TSH SERPL DL<=0.005 MIU/L-ACNC: 1.3 UIU/ML (ref 0.36–3.74)
VLDLC SERPL-MCNC: 40 MG/DL
WBC # BLD AUTO: 5.58 K/UL (ref 4.5–11)

## 2024-08-08 PROCEDURE — 1159F MED LIST DOCD IN RCRD: CPT | Mod: CPTII,,, | Performed by: NURSE PRACTITIONER

## 2024-08-08 PROCEDURE — 3046F HEMOGLOBIN A1C LEVEL >9.0%: CPT | Mod: CPTII,,, | Performed by: NURSE PRACTITIONER

## 2024-08-08 PROCEDURE — 96372 THER/PROPH/DIAG INJ SC/IM: CPT | Mod: ,,, | Performed by: NURSE PRACTITIONER

## 2024-08-08 PROCEDURE — 99214 OFFICE O/P EST MOD 30 MIN: CPT | Mod: 25,,, | Performed by: NURSE PRACTITIONER

## 2024-08-08 PROCEDURE — 80050 GENERAL HEALTH PANEL: CPT | Mod: ,,, | Performed by: CLINICAL MEDICAL LABORATORY

## 2024-08-08 PROCEDURE — 3008F BODY MASS INDEX DOCD: CPT | Mod: CPTII,,, | Performed by: NURSE PRACTITIONER

## 2024-08-08 PROCEDURE — 1160F RVW MEDS BY RX/DR IN RCRD: CPT | Mod: CPTII,,, | Performed by: NURSE PRACTITIONER

## 2024-08-08 PROCEDURE — 3078F DIAST BP <80 MM HG: CPT | Mod: CPTII,,, | Performed by: NURSE PRACTITIONER

## 2024-08-08 PROCEDURE — 3074F SYST BP LT 130 MM HG: CPT | Mod: CPTII,,, | Performed by: NURSE PRACTITIONER

## 2024-08-08 PROCEDURE — 4010F ACE/ARB THERAPY RXD/TAKEN: CPT | Mod: CPTII,,, | Performed by: NURSE PRACTITIONER

## 2024-08-08 PROCEDURE — 80061 LIPID PANEL: CPT | Mod: ,,, | Performed by: CLINICAL MEDICAL LABORATORY

## 2024-08-08 RX ORDER — FLUCONAZOLE 150 MG/1
TABLET ORAL
Qty: 2 TABLET | Refills: 0 | Status: SHIPPED | OUTPATIENT
Start: 2024-08-08

## 2024-08-08 RX ORDER — IPRATROPIUM BROMIDE AND ALBUTEROL SULFATE 2.5; .5 MG/3ML; MG/3ML
3 SOLUTION RESPIRATORY (INHALATION) EVERY 6 HOURS PRN
Qty: 75 ML | Refills: 0 | Status: SHIPPED | OUTPATIENT
Start: 2024-08-08 | End: 2025-08-08

## 2024-08-08 RX ORDER — KETOROLAC TROMETHAMINE 30 MG/ML
30 INJECTION, SOLUTION INTRAMUSCULAR; INTRAVENOUS
Status: COMPLETED | OUTPATIENT
Start: 2024-08-08 | End: 2024-08-08

## 2024-08-08 RX ADMIN — KETOROLAC TROMETHAMINE 30 MG: 30 INJECTION, SOLUTION INTRAMUSCULAR; INTRAVENOUS at 11:08

## 2024-09-03 PROBLEM — F33.9 EPISODE OF RECURRENT MAJOR DEPRESSIVE DISORDER, UNSPECIFIED DEPRESSION EPISODE SEVERITY: Status: ACTIVE | Noted: 2024-09-03

## 2024-09-03 PROBLEM — I82.5Y9 CHRONIC DEEP VEIN THROMBOSIS (DVT) OF PROXIMAL VEIN OF LOWER EXTREMITY, UNSPECIFIED LATERALITY: Status: ACTIVE | Noted: 2024-09-03

## 2024-09-03 NOTE — PROGRESS NOTES
Anabell Mendez NP   [address] [place] [address]     PATIENT NAME: Kirk Villar  : 1965  DATE: 24  MRN: [id number]      Billing Provider: Anabell Mendez NP  Level of Service:   Patient PCP Information       Provider PCP Type    Anabell Mendez NP General            Reason for Visit / Chief Complaint: hospital followup  (Hospital followup for Wheezing, shortness of breath)       Update PCP  Update Chief Complaint         History of Present Illness / Problem Focused Workflow     Kirk Villar presents to the clinic with hospital followup  (Hospital followup for Wheezing, shortness of breath)     HPI  Patient presents to clinic today for hospital follow up. She was admitted with COPD exacerbation. She was sent home with oxygen and home health but has not heard from home health yet. She has an appointment with [facility] on Monday. She reports she is down to 4 cigarettes/day. She is feeling some better since being discharged. She needs medication refills today.    Review of Systems     Review of Systems   Constitutional:  Negative for activity change, appetite change, chills and fever.   HENT:  Negative for ear pain, hearing loss, trouble swallowing and voice change.    Eyes:  Negative for visual disturbance.   Respiratory:  Negative for apnea, cough, chest tightness and shortness of breath.    Cardiovascular:  Negative for chest pain, palpitations and leg swelling.   Gastrointestinal:  Negative for abdominal pain, blood in stool, change in bowel habit and reflux.   Genitourinary:  Negative for bladder incontinence, difficulty urinating and flank pain.   Musculoskeletal:  Negative for back pain, gait problem, joint swelling and myalgias.   Integumentary:  Negative for color change and pallor.   Neurological:  Negative for dizziness, weakness, numbness and headaches.   Psychiatric/Behavioral:  Negative for sleep disturbance. The patient is not nervous/anxious.         Medical / Social /  Family History     Past Medical History:   Diagnosis Date    Depression     Diabetes mellitus, type 2     DVT (deep venous thrombosis)     Factor 5 Leiden mutation, heterozygous        History reviewed. No pertinent surgical history.    Social History  Ms.  reports that she has been smoking cigarettes. She started smoking about 49 years ago. She has a 24.5 pack-year smoking history. She has never used smokeless tobacco. She reports current alcohol use. She reports that she does not use drugs.    Family History  Ms.'s family history includes Cancer in her mother; Heart failure in her father.    Medications and Allergies     Medications  Outpatient Medications Marked as Taking for the 7/19/24 encounter (Office Visit) with Anabell Mendez NP   Medication Sig Dispense Refill    insulin aspart U-100 (NOVOLOG FLEXPEN U-100 INSULIN) 100 unit/mL (3 mL) InPn pen Inject 5-10 minutes before meals per sliding scale 2 each 2    nicotine (NICODERM CQ) 7 mg/24 hr Place 1 patch onto the skin once daily. 14 patch 6    [DISCONTINUED] albuterol (VENTOLIN HFA) 90 mcg/actuation inhaler Inhale 2 puffs into the lungs every 6 (six) hours as needed for Wheezing. Rescue 18 g 2    [DISCONTINUED] blood sugar diagnostic (ACCU-CHEK GUIDE TEST STRIPS) Strp 1 strip by Misc.(Non-Drug; Combo Route) route 3 (three) times daily. 100 strip 11    [DISCONTINUED] citalopram (CELEXA) 40 MG tablet Take 1 tablet (40 mg total) by mouth once daily. 90 tablet 2    [DISCONTINUED] clopidogreL (PLAVIX) 75 mg tablet Take 1 tablet (75 mg total) by mouth once daily. 90 tablet 2    [DISCONTINUED] cyclobenzaprine (FLEXERIL) 10 MG tablet Take 1 tablet (10 mg total) by mouth 2 (two) times daily as needed for Muscle spasms. 60 tablet 2    [DISCONTINUED] dapagliflozin propanediol (FARXIGA) 5 mg Tab tablet Take 1 tablet (5 mg total) by mouth once daily. 90 tablet 1    [DISCONTINUED] docusate sodium (COLACE) 100 MG capsule Take 200 mg by mouth once daily.       "[DISCONTINUED] ezetimibe (ZETIA) 10 mg tablet Take 1 tablet (10 mg total) by mouth once daily. 90 tablet 2    [DISCONTINUED] fluticasone-salmeterol diskus inhaler 250-50 mcg Inhale 1 puff into the lungs 2 (two) times daily. Controller 60 each 11    [DISCONTINUED] furosemide (LASIX) 40 MG tablet Take 1 tablet (40 mg total) by mouth once daily. 90 tablet 3    [DISCONTINUED] gabapentin (NEURONTIN) 800 MG tablet Take 1 tablet (800 mg total) by mouth 4 (four) times daily. 120 tablet 2    [DISCONTINUED] insulin degludec (TRESIBA FLEXTOUCH U-100) 100 unit/mL (3 mL) insulin pen Inject 20 Units into the skin every evening. 6 mL 11    [DISCONTINUED] metoprolol succinate (TOPROL-XL) 25 MG 24 hr tablet Take 1 tablet (25 mg total) by mouth 2 (two) times daily. 180 tablet 2    [DISCONTINUED] nicotine, polacrilex, (NICORETTE) 2 mg Gum Take 1 each (2 mg total) by mouth as needed. 100 each 0    [DISCONTINUED] rivaroxaban (XARELTO) 20 mg Tab Take 1 tablet (20 mg total) by mouth daily with dinner or evening meal. 90 tablet 2    [DISCONTINUED] rosuvastatin (CRESTOR) 20 MG tablet Take 1 tablet (20 mg total) by mouth once daily. 90 tablet 1    [DISCONTINUED] sacubitriL-valsartan (ENTRESTO) 24-26 mg per tablet Take 1 tablet by mouth 2 (two) times daily. 180 tablet 1    [DISCONTINUED] traZODone (DESYREL) 100 MG tablet Take 1 tablet (100 mg total) by mouth every evening. 90 tablet 2       Allergies  Review of patient's allergies indicates:  No Known Allergies    Physical Examination   /70 (BP Location: Left arm, Patient Position: Sitting, BP Method: Large (Manual))   Pulse 84   Temp 97.4 °F (36.3 °C) (Temporal)   Resp 20   Ht 5' 6" (1.676 m)   Wt 102.1 kg (225 lb)   SpO2 96%   BMI 36.32 kg/m²    Physical Exam  Vitals and nursing note reviewed.   Constitutional:       Appearance: Normal appearance. She is obese.   HENT:      Head: Normocephalic.      Nose: Nose normal.      Mouth/Throat:      Mouth: Mucous membranes are moist. "   Eyes:      Extraocular Movements: Extraocular movements intact.      Conjunctiva/sclera: Conjunctivae normal.      Pupils: Pupils are equal, round, and reactive to light.   Cardiovascular:      Rate and Rhythm: Normal rate and regular rhythm.      Pulses: Normal pulses.      Heart sounds: Normal heart sounds.   Pulmonary:      Effort: Pulmonary effort is normal.      Breath sounds: Normal breath sounds.   Abdominal:      General: Abdomen is flat. Bowel sounds are normal.      Palpations: Abdomen is soft.   Musculoskeletal:         General: Normal range of motion.      Cervical back: Normal range of motion and neck supple.   Skin:     General: Skin is warm and dry.      Capillary Refill: Capillary refill takes less than 2 seconds.   Neurological:      General: No focal deficit present.      Mental Status: She is alert and oriented to person, place, and time.   Psychiatric:         Behavior: Behavior normal.         Thought Content: Thought content normal.          Assessment and Plan (including Health Maintenance)      Problem List  Smart Sets  Document Outside HM   :    Plan:   Continue medications as prescribed.   Call and check on home health.  Attend scheduled cardiology appointment.   Encouraged smoking cessation.  RTC as needed.      Health Maintenance Due   Topic Date Due    Hepatitis C Screening  Never done    Cervical Cancer Screening  Never done    Diabetes Urine Screening  Never done    Pneumococcal Vaccines (Age 0-64) (1 of 2 - PCV) Never done    Foot Exam  Never done    Eye Exam  Never done    HIV Screening  Never done    TETANUS VACCINE  Never done    Mammogram  Never done    Colorectal Cancer Screening  Never done    Shingles Vaccine (1 of 2) Never done    RSV Vaccine (Age 60+ and Pregnant patients) (1 - 1-dose 60+ series) Never done    Influenza Vaccine (1) Never done    COVID-19 Vaccine (1 - 2023-24 season) Never done       Problem List Items Addressed This Visit          Psychiatric    Episode of  recurrent major depressive disorder, unspecified depression episode severity       Cardiac/Vascular    Hypertension    Relevant Medications    metoprolol succinate (TOPROL-XL) 25 MG 24 hr tablet    ACC/AHA stage C congestive heart failure due to ischemic cardiomyopathy    Relevant Medications    sacubitriL-valsartan (ENTRESTO) 24-26 mg per tablet    furosemide (LASIX) 40 MG tablet       Hematology    Chronic deep vein thrombosis (DVT) of proximal vein of lower extremity, unspecified laterality     Other Visit Diagnoses       Chronic obstructive pulmonary disease, unspecified COPD type    -  Primary    Relevant Medications    albuterol (VENTOLIN HFA) 90 mcg/actuation inhaler    Type 2 diabetes mellitus with hyperglycemia, with long-term current use of insulin        Relevant Medications    insulin degludec (TRESIBA FLEXTOUCH U-100) 100 unit/mL (3 mL) insulin pen    gabapentin (NEURONTIN) 800 MG tablet    dapagliflozin propanediol (FARXIGA) 5 mg Tab tablet    Generalized anxiety disorder        Relevant Medications    citalopram (CELEXA) 40 MG tablet    Mixed hyperlipidemia        Relevant Medications    rosuvastatin (CRESTOR) 20 MG tablet    ezetimibe (ZETIA) 10 mg tablet            Health Maintenance Topics with due status: Not Due       Topic Last Completion Date    LDCT Lung Screen 05/01/2024    Hemoglobin A1c 07/09/2024    High Dose Statin 08/08/2024    Lipid Panel 08/08/2024       Future Appointments   Date Time Provider Department Center   9/9/2024 11:20 AM Ramo Diggs MD OB CARD [facility]   5/1/2025  2:00 PM [facility] CT[facility] CT[facility] [place]            Signature:  Anabell Mendez NP      [address]   [place] [address]    Date of encounter: 7/19/24

## 2024-09-03 NOTE — PROGRESS NOTES
"   Anabell Mendez NP   [address] [place] [address]     PATIENT NAME: Kirk Villar  : 1965  DATE: 24  MRN: [id number]      Billing Provider: Anabell Mendez NP  Level of Service: CT OFFICE/OUTPT VISIT, EST, LEVL IV, 30-39 MIN  Patient PCP Information       Provider PCP Type    Anabell Mendez NP General            Reason for Visit / Chief Complaint: Annual Exam (Annual exam ) and Back Pain (Middle back pain )       Update PCP  Update Chief Complaint         History of Present Illness / Problem Focused Workflow     Kirk Villar presents to the clinic with Annual Exam (Annual exam ) and Back Pain (Middle back pain )     Back Pain  Pertinent negatives include no abdominal pain, bladder incontinence, chest pain, fever, headaches, numbness or weakness.     Patient presents to clinic today for annual exam. She is taking medication as prescribed. She is not currently dieting or exercising. She does check her BG and continues to use sliding scale insulin. She is due for lab work today.  She also reports mid-low back pain. She reports picking up a case of drinks and heard a "pop". Pain is worst with position changes. Tylenol is not helping.  Lastly, she reports being on recent antibiotics and now has a vaginal yeast infection. She request diflucan.     Review of Systems     Review of Systems   Constitutional:  Negative for activity change, appetite change, chills and fever.   HENT:  Negative for ear pain, hearing loss, trouble swallowing and voice change.    Eyes:  Negative for visual disturbance.   Respiratory:  Negative for apnea, cough, chest tightness and shortness of breath.    Cardiovascular:  Negative for chest pain, palpitations and leg swelling.   Gastrointestinal:  Negative for abdominal pain, blood in stool, change in bowel habit and reflux.   Genitourinary:  Negative for bladder incontinence, difficulty urinating and flank pain.   Musculoskeletal:  Positive for back pain. Negative for " gait problem, joint swelling and myalgias.   Integumentary:  Negative for color change and pallor.   Neurological:  Negative for dizziness, weakness, numbness and headaches.   Psychiatric/Behavioral:  Negative for sleep disturbance. The patient is not nervous/anxious.         Medical / Social / Family History     Past Medical History:   Diagnosis Date    Depression     Diabetes mellitus, type 2     DVT (deep venous thrombosis)     Factor 5 Leiden mutation, heterozygous        History reviewed. No pertinent surgical history.    Social History  Ms.  reports that she has been smoking cigarettes. She started smoking about 49 years ago. She has a 24.5 pack-year smoking history. She has never used smokeless tobacco. She reports current alcohol use. She reports that she does not use drugs.    Family History  Ms.'s family history includes Cancer in her mother; Heart failure in her father.    Medications and Allergies     Medications  Outpatient Medications Marked as Taking for the 8/8/24 encounter (Office Visit) with Anabell Mendez NP   Medication Sig Dispense Refill    albuterol (VENTOLIN HFA) 90 mcg/actuation inhaler Inhale 2 puffs into the lungs every 6 (six) hours as needed for Wheezing. Rescue 18 g 2    citalopram (CELEXA) 40 MG tablet Take 1 tablet (40 mg total) by mouth once daily. 90 tablet 2    clopidogreL (PLAVIX) 75 mg tablet Take 1 tablet (75 mg total) by mouth once daily. 90 tablet 2    cyclobenzaprine (FLEXERIL) 10 MG tablet TAKE 1 TABLET TWICE DAILY AS NEEDED FOR MUSCLE SPASM(S) 60 tablet 11    dapagliflozin propanediol (FARXIGA) 5 mg Tab tablet Take 1 tablet (5 mg total) by mouth once daily. 90 tablet 1    docusate sodium (COLACE) 100 MG capsule Take 2 capsules (200 mg total) by mouth once daily. 60 capsule 2    ezetimibe (ZETIA) 10 mg tablet Take 1 tablet (10 mg total) by mouth once daily. 90 tablet 2    furosemide (LASIX) 40 MG tablet Take 1 tablet (40 mg total) by mouth once daily. 90 tablet 3     "gabapentin (NEURONTIN) 800 MG tablet Take 1 tablet (800 mg total) by mouth 4 (four) times daily. 120 tablet 2    insulin aspart U-100 (NOVOLOG FLEXPEN U-100 INSULIN) 100 unit/mL (3 mL) InPn pen Inject 5-10 minutes before meals per sliding scale 2 each 2    insulin degludec (TRESIBA FLEXTOUCH U-100) 100 unit/mL (3 mL) insulin pen Inject 20 Units into the skin every evening. 6 mL 11    metoprolol succinate (TOPROL-XL) 25 MG 24 hr tablet Take 1 tablet (25 mg total) by mouth 2 (two) times daily. 180 tablet 2    mupirocin (BACTROBAN) 2 % ointment Apply topically 2 (two) times daily. 15 g 2    nicotine (NICODERM CQ) 7 mg/24 hr Place 1 patch onto the skin once daily. 14 patch 6    rivaroxaban (XARELTO) 20 mg Tab Take 1 tablet (20 mg total) by mouth daily with dinner or evening meal. 90 tablet 2    rosuvastatin (CRESTOR) 20 MG tablet Take 1 tablet (20 mg total) by mouth once daily. 90 tablet 1    sacubitriL-valsartan (ENTRESTO) 24-26 mg per tablet Take 1 tablet by mouth 2 (two) times daily. 180 tablet 1    [DISCONTINUED] blood sugar diagnostic (ACCU-CHEK GUIDE TEST STRIPS) Strp 1 strip by Misc.(Non-Drug; Combo Route) route 3 (three) times daily. 100 strip 11       Allergies  Review of patient's allergies indicates:  No Known Allergies    Physical Examination   /70 (BP Location: Left arm, Patient Position: Sitting, BP Method: Large (Manual))   Pulse 81   Temp 97.8 °F (36.6 °C) (Temporal)   Resp 18   Ht 5' 6" (1.676 m)   Wt 98 kg (216 lb)   SpO2 (!) 93%   BMI 34.86 kg/m²    Physical Exam  Vitals and nursing note reviewed.   Constitutional:       Appearance: Normal appearance. She is normal weight.   HENT:      Head: Normocephalic.      Nose: Nose normal.      Mouth/Throat:      Mouth: Mucous membranes are moist.   Eyes:      Extraocular Movements: Extraocular movements intact.      Conjunctiva/sclera: Conjunctivae normal.      Pupils: Pupils are equal, round, and reactive to light.   Cardiovascular:      Rate and " Rhythm: Normal rate and regular rhythm.      Pulses: Normal pulses.      Heart sounds: Normal heart sounds.   Pulmonary:      Effort: Pulmonary effort is normal.      Breath sounds: Normal breath sounds.   Abdominal:      General: Abdomen is flat. Bowel sounds are normal.      Palpations: Abdomen is soft.   Musculoskeletal:         General: Tenderness present. No swelling. Normal range of motion.      Cervical back: Normal range of motion and neck supple.   Skin:     General: Skin is warm and dry.      Capillary Refill: Capillary refill takes less than 2 seconds.   Neurological:      General: No focal deficit present.      Mental Status: She is alert and oriented to person, place, and time.   Psychiatric:         Behavior: Behavior normal.         Thought Content: Thought content normal.          Assessment and Plan (including Health Maintenance)      Problem List  Smart Sets  Document Outside HM   :    Plan:   Lab work obtained. Will follow up with results and develop POC Accordingly.  Take diflucan as prescribed.  Anti-inflammatory inj. Given in clinic. Recommended rest. Ice/heat as needed.  Continue medications as prescribed.  Diet and exercise.  RTC as needed or in 3 months for follow up.      Health Maintenance Due   Topic Date Due    Hepatitis C Screening  Never done    Cervical Cancer Screening  Never done    Diabetes Urine Screening  Never done    Pneumococcal Vaccines (Age 0-64) (1 of 2 - PCV) Never done    Foot Exam  Never done    Eye Exam  Never done    HIV Screening  Never done    TETANUS VACCINE  Never done    Mammogram  Never done    Colorectal Cancer Screening  Never done    Shingles Vaccine (1 of 2) Never done    RSV Vaccine (Age 60+ and Pregnant patients) (1 - 1-dose 60+ series) Never done    Influenza Vaccine (1) Never done    COVID-19 Vaccine (1 - 2023-24 season) Never done       Problem List Items Addressed This Visit          Cardiac/Vascular    Hypertension    Relevant Orders    CBC Auto  Differential (Completed)    Comprehensive Metabolic Panel (Completed)    ACC/AHA stage C congestive heart failure due to ischemic cardiomyopathy    Relevant Orders    CBC Auto Differential (Completed)    Comprehensive Metabolic Panel (Completed)    Lipid Panel (Completed)     Other Visit Diagnoses       Type 2 diabetes mellitus with hyperglycemia, with long-term current use of insulin    -  Primary    Relevant Orders    CBC Auto Differential (Completed)    Comprehensive Metabolic Panel (Completed)    Mixed hyperlipidemia        Relevant Orders    Lipid Panel (Completed)    Candidiasis of genitalia in female        Relevant Medications    fluconazole (DIFLUCAN) 150 MG Tab    Acute bilateral low back pain without sciatica        Relevant Medications    ketorolac injection 30 mg (Completed)    Chronic obstructive pulmonary disease, unspecified COPD type        Relevant Medications    albuterol-ipratropium (DUO-NEB) 2.5 mg-0.5 mg/3 mL nebulizer solution    Routine check-up        Relevant Orders    CBC Auto Differential (Completed)    Comprehensive Metabolic Panel (Completed)    Lipid Panel (Completed)    TSH (Completed)            Health Maintenance Topics with due status: Not Due       Topic Last Completion Date    LDCT Lung Screen 05/01/2024    Hemoglobin A1c 07/09/2024    High Dose Statin 08/08/2024    Lipid Panel 08/08/2024       Future Appointments   Date Time Provider Department Center   9/9/2024 11:20 AM Ramo Diggs MD [facility] CARD [facility]   5/1/2025  2:00 PM [facility] CT1 OB CTIC [facility] Carla            Signature:  Anabell Mendez NP      [address] [place] [address]    Date of encounter: 8/8/24

## 2024-09-04 DIAGNOSIS — F41.1 GENERALIZED ANXIETY DISORDER: ICD-10-CM

## 2024-09-04 RX ORDER — TRAZODONE HYDROCHLORIDE 100 MG/1
100 TABLET ORAL NIGHTLY
Qty: 90 TABLET | Refills: 3 | Status: SHIPPED | OUTPATIENT
Start: 2024-09-04

## 2024-09-04 RX ORDER — CLOPIDOGREL BISULFATE 75 MG/1
75 TABLET ORAL
Qty: 90 TABLET | Refills: 3 | Status: SHIPPED | OUTPATIENT
Start: 2024-09-04

## 2024-09-04 RX ORDER — CITALOPRAM 40 MG/1
40 TABLET, FILM COATED ORAL
Qty: 90 TABLET | Refills: 3 | Status: SHIPPED | OUTPATIENT
Start: 2024-09-04

## 2024-10-02 DIAGNOSIS — Z79.4 TYPE 2 DIABETES MELLITUS WITH HYPERGLYCEMIA, WITH LONG-TERM CURRENT USE OF INSULIN: ICD-10-CM

## 2024-10-02 DIAGNOSIS — E11.65 TYPE 2 DIABETES MELLITUS WITH HYPERGLYCEMIA, WITH LONG-TERM CURRENT USE OF INSULIN: ICD-10-CM

## 2024-10-02 RX ORDER — GABAPENTIN 800 MG/1
TABLET ORAL
Qty: 120 TABLET | Refills: 11 | Status: SHIPPED | OUTPATIENT
Start: 2024-10-02

## 2024-10-03 DIAGNOSIS — I10 PRIMARY HYPERTENSION: ICD-10-CM

## 2024-10-03 DIAGNOSIS — I25.5 ACC/AHA STAGE C CONGESTIVE HEART FAILURE DUE TO ISCHEMIC CARDIOMYOPATHY: ICD-10-CM

## 2024-10-03 DIAGNOSIS — E11.65 TYPE 2 DIABETES MELLITUS WITH HYPERGLYCEMIA, WITH LONG-TERM CURRENT USE OF INSULIN: ICD-10-CM

## 2024-10-03 DIAGNOSIS — E78.2 MIXED HYPERLIPIDEMIA: ICD-10-CM

## 2024-10-03 DIAGNOSIS — Z79.4 TYPE 2 DIABETES MELLITUS WITH HYPERGLYCEMIA, WITH LONG-TERM CURRENT USE OF INSULIN: ICD-10-CM

## 2024-10-03 DIAGNOSIS — I50.9 ACC/AHA STAGE C CONGESTIVE HEART FAILURE DUE TO ISCHEMIC CARDIOMYOPATHY: ICD-10-CM

## 2024-10-03 RX ORDER — SACUBITRIL AND VALSARTAN 24; 26 MG/1; MG/1
1 TABLET, FILM COATED ORAL 2 TIMES DAILY
Qty: 180 TABLET | Refills: 1 | Status: SHIPPED | OUTPATIENT
Start: 2024-10-03

## 2024-10-03 RX ORDER — INSULIN DEGLUDEC 100 U/ML
20 INJECTION, SOLUTION SUBCUTANEOUS NIGHTLY
Qty: 6 ML | Refills: 11 | Status: SHIPPED | OUTPATIENT
Start: 2024-10-03 | End: 2025-10-03

## 2024-10-03 RX ORDER — METOPROLOL SUCCINATE 25 MG/1
25 TABLET, EXTENDED RELEASE ORAL 2 TIMES DAILY
Qty: 180 TABLET | Refills: 2 | Status: SHIPPED | OUTPATIENT
Start: 2024-10-03

## 2024-10-03 RX ORDER — ROSUVASTATIN CALCIUM 20 MG/1
20 TABLET, COATED ORAL DAILY
Qty: 90 TABLET | Refills: 1 | Status: SHIPPED | OUTPATIENT
Start: 2024-10-03 | End: 2025-10-03

## 2024-10-03 RX ORDER — INSULIN ASPART 100 [IU]/ML
INJECTION, SOLUTION INTRAVENOUS; SUBCUTANEOUS
Qty: 2 EACH | Refills: 2 | Status: SHIPPED | OUTPATIENT
Start: 2024-10-03

## 2024-10-03 RX ORDER — EZETIMIBE 10 MG/1
10 TABLET ORAL DAILY
Qty: 90 TABLET | Refills: 2 | Status: SHIPPED | OUTPATIENT
Start: 2024-10-03

## 2024-10-03 RX ORDER — DAPAGLIFLOZIN 5 MG/1
5 TABLET, FILM COATED ORAL DAILY
Qty: 90 TABLET | Refills: 1 | Status: SHIPPED | OUTPATIENT
Start: 2024-10-03

## 2024-10-03 NOTE — TELEPHONE ENCOUNTER
----- Message from Shahana sent at 10/2/2024  1:19 PM CDT -----  Regarding: med refill  Needs refills for all meds and would like to know what the slow acting Insulin is, because she needs it.   Please call into [facility].  Please call patient before calling in the request at [phone].  Thank you.

## 2024-10-14 PROBLEM — J96.01 ACUTE RESPIRATORY FAILURE WITH HYPOXIA: Status: RESOLVED | Noted: 2024-07-09 | Resolved: 2024-10-14

## 2024-11-03 ENCOUNTER — HOSPITAL ENCOUNTER (EMERGENCY)
Facility: HOSPITAL | Age: 59
Discharge: HOME | End: 2024-11-03
Payer: MEDICARE

## 2024-11-03 VITALS
SYSTOLIC BLOOD PRESSURE: 177 MMHG | HEIGHT: 65 IN | BODY MASS INDEX: 33.32 KG/M2 | TEMPERATURE: 98 F | RESPIRATION RATE: 20 BRPM | DIASTOLIC BLOOD PRESSURE: 84 MMHG | HEART RATE: 95 BPM | OXYGEN SATURATION: 100 % | WEIGHT: 200 LBS

## 2024-11-03 DIAGNOSIS — S42.291A HUMERAL HEAD FRACTURE, RIGHT, CLOSED, INITIAL ENCOUNTER: Primary | ICD-10-CM
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
## 2024-11-03 DIAGNOSIS — M25.511 RIGHT SHOULDER PAIN: ICD-10-CM

## 2024-11-03 PROCEDURE — 25000003 PHARM REV CODE 250: Performed by: NURSE PRACTITIONER

## 2024-11-03 PROCEDURE — 99284 EMERGENCY DEPT VISIT MOD MDM: CPT | Mod: 25

## 2024-11-03 PROCEDURE — 96372 THER/PROPH/DIAG INJ SC/IM: CPT | Performed by: NURSE PRACTITIONER

## 2024-11-03 PROCEDURE — 63600175 PHARM REV CODE 636 W HCPCS: Performed by: NURSE PRACTITIONER

## 2024-11-03 RX ORDER — ONDANSETRON HYDROCHLORIDE 2 MG/ML
4 INJECTION, SOLUTION INTRAVENOUS
Status: COMPLETED | OUTPATIENT
Start: 2024-11-03 | End: 2024-11-03

## 2024-11-03 RX ORDER — HYDROCODONE BITARTRATE AND ACETAMINOPHEN 7.5; 325 MG/1; MG/1
1 TABLET ORAL EVERY 6 HOURS PRN
Qty: 12 TABLET | Refills: 0 | Status: SHIPPED | OUTPATIENT
Start: 2024-11-03

## 2024-11-03 RX ORDER — MORPHINE SULFATE 4 MG/ML
4 INJECTION, SOLUTION INTRAMUSCULAR; INTRAVENOUS
Status: COMPLETED | OUTPATIENT
Start: 2024-11-03 | End: 2024-11-03

## 2024-11-03 RX ORDER — HYDROCODONE BITARTRATE AND ACETAMINOPHEN 10; 325 MG/1; MG/1
1 TABLET ORAL
Status: COMPLETED | OUTPATIENT
Start: 2024-11-03 | End: 2024-11-03

## 2024-11-03 RX ADMIN — HYDROCODONE BITARTRATE AND ACETAMINOPHEN 1 TABLET: 10; 325 TABLET ORAL at 07:11

## 2024-11-03 RX ADMIN — ONDANSETRON 4 MG: 2 INJECTION INTRAMUSCULAR; INTRAVENOUS at 05:11

## 2024-11-03 RX ADMIN — MORPHINE SULFATE 4 MG: 4 INJECTION INTRAVENOUS at 05:11

## 2024-11-03 NOTE — Clinical Note
"Kirk Rose" Aurea was seen and treated in our emergency department on 11/3/2024.  She may return to work on 11/07/2024.       If you have any questions or concerns, please don't hesitate to call.      Lisa Irizarry, FNP"

## 2024-11-03 NOTE — ED PROVIDER NOTES
Encounter Date: 11/3/2024       History     Chief Complaint   Patient presents with    Fall    Arm Pain     Patient presents to the ED with c/o falling last night and landing on her right arm. Patient complains of right arm pain saying it hurts to move it     Patient presents to the ER with complaint of fall.  Patient was reports she got up to go get water this morning around 1:00 a.m. and fell in her kitchen.  She denies hitting her head or LOC.  She was states she landed on her right shoulder.  She states she noted significant pain immediately after the fall.  She states she was have assistance to get up from the floor.  She states throughout the day her pain has become worse.  She was unable to lift or move her right arm.  She describes pain in her right shoulder and upper arm.  Patient reports history of diabetes coronary artery disease and laid in factor 5.  Patient does take Plavix daily.    The history is provided by the patient. No  was used.     Review of patient's allergies indicates:  No Known Allergies  Past Medical History:   Diagnosis Date    ACC/AHA stage C congestive heart failure due to ischemic cardiomyopathy 03/12/2024    ACE inhibitor intolerance 03/12/2024    Alcohol dependence with uncomplicated withdrawal 07/09/2024    CAD (coronary artery disease) 07/09/2024    Chronic deep vein thrombosis (DVT) of proximal vein of lower extremity, unspecified laterality 09/03/2024    CKD stage 3b, GFR 30-44 ml/min 07/11/2024    Diabetes mellitus, type 2     Episode of recurrent major depressive disorder, unspecified depression episode severity 09/03/2024    Factor 5 Leiden mutation, heterozygous     Familial hyperlipidemia 03/12/2024    Plan to (re) initiate crestor  Did not tolerate lipitor       History reviewed. No pertinent surgical history.  Family History   Problem Relation Name Age of Onset    Cancer Mother      Heart failure Father       Social History     Tobacco Use    Smoking  status: Every Day     Average packs/day: 0.5 packs/day for 49.0 years (24.5 ttl pk-yrs)     Types: Cigarettes     Start date: 1975    Smokeless tobacco: Never    Tobacco comments:     Patient states 4 singles a day   Substance Use Topics    Alcohol use: Yes     Comment: occasionally    Drug use: Never     Review of Systems   Constitutional:  Positive for activity change and fatigue.   Musculoskeletal:  Positive for arthralgias (Right shoulder and upper arm pain) and myalgias.   All other systems reviewed and are negative.      Physical Exam     Initial Vitals [11/03/24 1657]   BP Pulse Resp Temp SpO2   (!) 170/97 99 20 98 °F (36.7 °C) 96 %      MAP       --         Physical Exam    Nursing note and vitals reviewed.  Constitutional: She appears well-developed and well-nourished. She appears distressed.   HENT:   Head: Normocephalic.   Nose: Nose normal. Mouth/Throat: Oropharynx is clear and moist.   Eyes: Conjunctivae and EOM are normal.   Neck: Neck supple.   Normal range of motion.  Cardiovascular:  Normal rate, normal heart sounds and intact distal pulses.           Pulmonary/Chest: Breath sounds normal.   Abdominal: Abdomen is soft. Bowel sounds are normal.   Musculoskeletal:         General: Normal range of motion.      Cervical back: Normal range of motion and neck supple.     Neurological: She is alert and oriented to person, place, and time. She has normal strength. GCS score is 15. GCS eye subscore is 4. GCS verbal subscore is 5. GCS motor subscore is 6.   Skin: Skin is warm and dry. Capillary refill takes less than 2 seconds. Bruising noted.        Psychiatric: She has a normal mood and affect. Thought content normal.         Medical Screening Exam   See Full Note    ED Course   Procedures  Labs Reviewed - No data to display       Imaging Results               X-Ray Humerus 2 View Right (Final result)  Result time 11/03/24 18:31:06      Final result by Jung Mays MD (11/03/24 18:31:06)                    Impression:      See above comments.  Recommend orthopedic follow-up.    This report was flagged in Epic as abnormal.      Electronically signed by: Jung Mays  Date:    11/03/2024  Time:    18:31               Narrative:    EXAMINATION:  XR HUMERUS 2 VIEW RIGHT    CLINICAL HISTORY:  Unspecified fall, initial encounter    TECHNIQUE:  Three views of the right humerus    COMPARISON:  None    FINDINGS:  There is an acute fracture at of the right humeral neck and head.  Mild impaction.  Mild comminution.    Mid and distal humerus are intact.  Right hemithorax is clear.    Humeral head may be minimally subluxed and could be associated with a joint effusion or hemarthrosis.    Mild degenerative changes of the AC joint.  Clavicle is intact.                                        X-Ray Shoulder Trauma Right (Final result)  Result time 11/03/24 18:30:49      Final result by Jung Mays MD (11/03/24 18:30:49)                   Impression:      See above comments.  Recommend orthopedic follow-up.    This report was flagged in Epic as abnormal.      Electronically signed by: Jung Mays  Date:    11/03/2024  Time:    18:30               Narrative:    EXAMINATION:  XR SHOULDER TRAUMA 3 VIEW RIGHT    CLINICAL HISTORY:  Pain in right shoulder    TECHNIQUE:  Three or four views of the right shoulder were performed.    COMPARISON:  None    FINDINGS:  There is an acute fracture at of the right humeral neck and head. Mild impaction. Mild comminution.    Mid and distal humerus are intact. Right hemithorax is clear.    Humeral head may be minimally subluxed and could be associated with a joint effusion or hemarthrosis.    Mild degenerative changes of the AC joint. Clavicle is intact.                                       Medications   morphine injection 4 mg (4 mg Intramuscular Given 11/3/24 1731)   ondansetron injection 4 mg (4 mg Intramuscular Given 11/3/24 1731)   HYDROcodone-acetaminophen  mg per tablet 1  tablet (1 tablet Oral Given 11/3/24 1925)     Medical Decision Making  Patient presents to the ER with complaint of fall.  Patient was reports she got up to go get water this morning around 1:00 a.m. and fell in her kitchen.  She denies hitting her head or LOC.  She was states she landed on her right shoulder.  She states she noted significant pain immediately after the fall.  She states she was have assistance to get up from the floor.  She states throughout the day her pain has become worse.  She was unable to lift or move her right arm.  She describes pain in her right shoulder and upper arm.  Patient reports history of diabetes coronary artery disease and laid in factor 5.  Patient does take Plavix daily.      Amount and/or Complexity of Data Reviewed  External Data Reviewed: notes.  Radiology: ordered. Decision-making details documented in ED Course.     Details: Right shoulder x-ray and right humerus x-ray reviewed with Dr. Burns.  Discussion of management or test interpretation with external provider(s): Morphine 4 mg IM  Zofran 4 mg IM  Norco 10 mg p.o.   Patient was placed in sling by nursing staff and re-evaluated by myself prior to discharge.  Patient verbalized improvement of symptoms prior to discharge.  Patient was discharged home with diagnosis of right humeral head fracture closed right shoulder pain and fall.  Patient was given referral to follow up in ortho clinic with Dr. Meyer.  She was instructed to call his office in a.m. to schedule appointment.  She was given prescription for Norco 7.5 p.o. to take as needed for pain.  She was told to sleep in a semi upright position for comfort.  She was told to wear sling until follow up appointment with Dr. Elizabeth.  Patient verbalizes understanding and agrees with plan of care.    Risk  Prescription drug management.                                      Clinical Impression:   Final diagnoses:  [W19.XXXA] Fall  [M25.511] Right shoulder  pain  [S42.291A] Humeral head fracture, right, closed, initial encounter (Primary)        ED Disposition Condition    Discharge Stable          ED Prescriptions       Medication Sig Dispense Start Date End Date Auth. Provider    HYDROcodone-acetaminophen (NORCO) 7.5-325 mg per tablet Take 1 tablet by mouth every 6 (six) hours as needed for Pain. 12 tablet 11/3/2024 -- Lisa Irizarry FNP          Follow-up Information       Follow up With Specialties Details Why Contact Info    Miguel Meyer MD Orthopedic Surgery Schedule an appointment as soon as possible for a visit in 2 days If symptoms worsen [address] [place] [address]  [facility] [place] [address]  [phone]               Lisa Irizarry FNP  11/03/24 7650

## 2024-11-04 ENCOUNTER — TELEPHONE (OUTPATIENT)
Dept: EMERGENCY MEDICINE | Facility: HOSPITAL | Age: 59
End: 2024-11-04
Payer: MEDICARE

## 2024-11-04 NOTE — DISCHARGE INSTRUCTIONS
Wear sling until follow up with orthopedic clinic.  Take medications as prescribed.  Alternate heat and ice compresses for comfort.  Sleep in semi upright position for comfort.  Call 's office in AM to schedule follow up appointment.  Return to the ER with new or worsening symptoms.

## 2024-11-05 DIAGNOSIS — M89.8X2 PAIN OF RIGHT HUMERUS: Primary | ICD-10-CM

## 2024-11-06 ENCOUNTER — HOSPITAL ENCOUNTER (OUTPATIENT)
Dept: RADIOLOGY | Facility: HOSPITAL | Age: 59
Discharge: HOME | End: 2024-11-06
Attending: ORTHOPAEDIC SURGERY
Payer: MEDICARE

## 2024-11-06 ENCOUNTER — OFFICE VISIT (OUTPATIENT)
Dept: ORTHOPEDICS | Facility: CLINIC | Age: 59
End: 2024-11-06
Payer: MEDICARE

## 2024-11-06 ENCOUNTER — TELEPHONE (OUTPATIENT)
Dept: ORTHOPEDICS | Facility: CLINIC | Age: 59
End: 2024-11-06
Payer: MEDICARE

## 2024-11-06 DIAGNOSIS — S42.291A HUMERAL HEAD FRACTURE, RIGHT, CLOSED, INITIAL ENCOUNTER: Primary | ICD-10-CM

## 2024-11-06 DIAGNOSIS — M89.8X2 PAIN OF RIGHT HUMERUS: ICD-10-CM

## 2024-11-06 PROCEDURE — 23600 CLTX PROX HUMRL FX W/O MNPJ: CPT | Mod: PBBFAC | Performed by: ORTHOPAEDIC SURGERY

## 2024-11-06 PROCEDURE — 73060 X-RAY EXAM OF HUMERUS: CPT | Mod: TC,RT

## 2024-11-06 PROCEDURE — 99214 OFFICE O/P EST MOD 30 MIN: CPT | Mod: PBBFAC,25 | Performed by: ORTHOPAEDIC SURGERY

## 2024-11-06 PROCEDURE — 99999 PR PBB SHADOW E&M-EST. PATIENT-LVL IV: CPT | Mod: PBBFAC,,, | Performed by: ORTHOPAEDIC SURGERY

## 2024-11-06 RX ORDER — HYDROCODONE BITARTRATE AND ACETAMINOPHEN 7.5; 325 MG/1; MG/1
1 TABLET ORAL EVERY 6 HOURS PRN
Qty: 28 TABLET | Refills: 0 | Status: SHIPPED | OUTPATIENT
Start: 2024-11-06

## 2024-11-06 NOTE — TELEPHONE ENCOUNTER
Talked with patient. Let her know Dr. Meyer is working on her note right now and will get that prescription sent in for her. Patient verbalized understanding and was thankful.

## 2024-11-06 NOTE — TELEPHONE ENCOUNTER
----- Message from Dyan sent at 11/6/2024  2:17 PM CST -----  Regarding: Pain Medication Not At Pharmacy  Who Called: Kirk Villar    Caller is requesting assistance/information from provider's office.    Symptoms (please be specific): Just spoke to her and she is at the [facility], but her medication is not. I could not get any information out of her because she was very mad/cursing. I told her I would get a message to y'all, however I do see where she called about 30 minutes ago so I am sorry for sending another message about her.    Preferred Method of Contact: Phone Call  Patient's Preferred Phone Number on File: [phone]

## 2024-11-06 NOTE — PROGRESS NOTES
CLINIC NOTE       Chief Complaint   Patient presents with    Right Upper Arm - Injury        Kirk Villar is a 59 y.o. female seen today for evaluation of right shoulder/arm injury.  She was reportedly fell at home in her kitchen on 11/03/2024.  She was she injured her right shoulder.  She was seen in the emergency room where x-rays revealed fracture of the right proximal humerus with minimal displacement.  She was placed in an arm sling and referred for orthopedic consultation.  She was right-hand dominant.  She has a history of diabetes, coronary artery disease and laid and factor 5 deficiency for which she takes Plavix daily.  She was seen in the emergency room by Lisa BURNETT.  She was given a prescription for Norco 7.5 mg 12.  He was states that she was out of pain medicine once a refill.      Past Medical History:   Diagnosis Date    ACC/AHA stage C congestive heart failure due to ischemic cardiomyopathy 03/12/2024    ACE inhibitor intolerance 03/12/2024    Alcohol dependence with uncomplicated withdrawal 07/09/2024    CAD (coronary artery disease) 07/09/2024    Chronic deep vein thrombosis (DVT) of proximal vein of lower extremity, unspecified laterality 09/03/2024    CKD stage 3b, GFR 30-44 ml/min 07/11/2024    Diabetes mellitus, type 2     Episode of recurrent major depressive disorder, unspecified depression episode severity 09/03/2024    Factor 5 Leiden mutation, heterozygous     Familial hyperlipidemia 03/12/2024    Plan to (re) initiate crestor  Did not tolerate lipitor       Family History   Problem Relation Name Age of Onset    Cancer Mother      Heart failure Father       Current Outpatient Medications on File Prior to Visit   Medication Sig Dispense Refill    albuterol (VENTOLIN HFA) 90 mcg/actuation inhaler Inhale 2 puffs into the lungs every 6 (six) hours as needed for Wheezing. Rescue 18 g 2    albuterol-ipratropium (DUO-NEB) 2.5 mg-0.5 mg/3 mL nebulizer solution Take 3 mLs by  nebulization every 6 (six) hours as needed for Wheezing. Rescue 75 mL 0    blood sugar diagnostic (ACCU-CHEK GUIDE TEST STRIPS) Strp 1 strip by Misc.(Non-Drug; Combo Route) route 3 (three) times daily. 100 strip 11    citalopram (CELEXA) 40 MG tablet TAKE 1 TABLET ONE TIME DAILY 90 tablet 3    clopidogreL (PLAVIX) 75 mg tablet TAKE 1 TABLET ONE TIME DAILY 90 tablet 3    cyclobenzaprine (FLEXERIL) 10 MG tablet TAKE 1 TABLET TWICE DAILY AS NEEDED FOR MUSCLE SPASM(S) 60 tablet 11    dapagliflozin propanediol (FARXIGA) 5 mg Tab tablet Take 1 tablet (5 mg total) by mouth once daily. 90 tablet 1    docusate sodium (COLACE) 100 MG capsule Take 2 capsules (200 mg total) by mouth once daily. 60 capsule 2    ezetimibe (ZETIA) 10 mg tablet Take 1 tablet (10 mg total) by mouth once daily. 90 tablet 2    fluconazole (DIFLUCAN) 150 MG Tab Take 1 tablet by mouth now, then repeat on day 3 2 tablet 0    furosemide (LASIX) 40 MG tablet Take 1 tablet (40 mg total) by mouth once daily. 90 tablet 3    gabapentin (NEURONTIN) 800 MG tablet TAKE 1 TABLET FOUR TIMES DAILY 120 tablet 11    HYDROcodone-acetaminophen (NORCO) 7.5-325 mg per tablet Take 1 tablet by mouth every 6 (six) hours as needed for Pain. 12 tablet 0    insulin aspart U-100 (NOVOLOG FLEXPEN U-100 INSULIN) 100 unit/mL (3 mL) InPn pen Inject 5-10 minutes before meals per sliding scale 2 each 2    insulin degludec (TRESIBA FLEXTOUCH U-100) 100 unit/mL (3 mL) insulin pen Inject 20 Units into the skin every evening. 6 mL 11    metoprolol succinate (TOPROL-XL) 25 MG 24 hr tablet Take 1 tablet (25 mg total) by mouth 2 (two) times daily. 180 tablet 2    mupirocin (BACTROBAN) 2 % ointment Apply topically 2 (two) times daily. 15 g 2    nicotine (NICODERM CQ) 7 mg/24 hr Place 1 patch onto the skin once daily. 14 patch 6    rivaroxaban (XARELTO) 20 mg Tab Take 1 tablet (20 mg total) by mouth daily with dinner or evening meal. 90 tablet 2    rosuvastatin (CRESTOR) 20 MG tablet Take 1  tablet (20 mg total) by mouth once daily. 90 tablet 1    sacubitriL-valsartan (ENTRESTO) 24-26 mg per tablet Take 1 tablet by mouth 2 (two) times daily. 180 tablet 1    traZODone (DESYREL) 100 MG tablet TAKE 1 TABLET EVERY EVENING 90 tablet 3     No current facility-administered medications on file prior to visit.       ROS     There were no vitals filed for this visit.    History reviewed. No pertinent surgical history.     Review of patient's allergies indicates:  No Known Allergies     Ortho Exam : Right shoulder is normal contour.  There is moderate diffuse soft tissue swelling and ecchymosis.  Motor and sensory function intact right hand.    Radiographic Examination:  Right shoulder 11/06/2024    Technique:  Two views AP and lateral scapular    Findings:  Bones well mineralized.  Glenohumeral joint is located.  He was a comminuted but minimally displaced fracture of the right proximal humerus surgical neck.    Impression:   See Above    Assessment and Plan  Patient Active Problem List    Diagnosis Date Noted    Chronic deep vein thrombosis (DVT) of proximal vein of lower extremity, unspecified laterality 09/03/2024    Episode of recurrent major depressive disorder, unspecified depression episode severity 09/03/2024    CKD stage 3b, GFR 30-44 ml/min 07/11/2024    Acute on chronic HFrEF (heart failure with reduced ejection fraction) 07/09/2024    Factor 5 Leiden mutation, heterozygous 07/09/2024    Alcohol dependence with uncomplicated withdrawal 07/09/2024    CAD (coronary artery disease) 07/09/2024    Heart failure with mid-range ejection fraction (HFmEF) 03/12/2024    Uncontrolled type 2 diabetes mellitus with hyperglycemia 03/12/2024    ACC/AHA stage C congestive heart failure due to ischemic cardiomyopathy 03/12/2024    Familial hyperlipidemia 03/12/2024    Impression: Right proximal humerus fracture  Plan: New arm sling issued.  We discussed gravity dependent positioning of fracture healing.  Rx Norco 7.5  mg number 28 recheck 2 weeks.  X-ray      Miguel Meyer M.D.

## 2024-11-12 ENCOUNTER — TELEPHONE (OUTPATIENT)
Dept: ORTHOPEDICS | Facility: CLINIC | Age: 59
End: 2024-11-12
Payer: MEDICARE

## 2024-11-12 NOTE — TELEPHONE ENCOUNTER
----- Message from Carmela sent at 11/12/2024  9:52 AM CST -----  Regarding: PT CALL BACK  Who Called: iKrk Villar    Caller is requesting assistance/information from provider's office.    -PT STATES THAT SHE'S RUNNING OUT OF MEDICATION AND NOT ABLE TO WAIT FOR HER NEXT APPT. PT STATES SHE'S IN PAIN.       Preferred Method of Contact: Phone Call  Patient's Preferred Phone Number on File: [phone]

## 2024-11-12 NOTE — TELEPHONE ENCOUNTER
Talked with patient. Patient states she is not out of pain medication yet but she just wanted to make sure she did not run out. Let patient know that Dr. Meyer can not refill prescription until after the 7 day time period. Patient verbalized understanding and just wanted to make sure she did not run out before the weekend. Let patient know will talk with Dr. Meyer tomorrow about getting her a new prescription before the weekend.  Patient also concerned about elbow area and wants to see what Dr. Meyer thinks about it. Let her know I will talk with Dr. Meyer and give her a call back tomorrow. Patient verbalized understanding and thankful.

## 2024-11-13 DIAGNOSIS — S42.291A HUMERAL HEAD FRACTURE, RIGHT, CLOSED, INITIAL ENCOUNTER: Primary | ICD-10-CM

## 2024-11-13 RX ORDER — HYDROCODONE BITARTRATE AND ACETAMINOPHEN 5; 325 MG/1; MG/1
1 TABLET ORAL EVERY 6 HOURS PRN
Qty: 28 TABLET | Refills: 0 | Status: SHIPPED | OUTPATIENT
Start: 2024-11-13

## 2024-11-19 DIAGNOSIS — S42.291A HUMERAL HEAD FRACTURE, RIGHT, CLOSED, INITIAL ENCOUNTER: Primary | ICD-10-CM

## 2024-11-20 ENCOUNTER — OFFICE VISIT (OUTPATIENT)
Dept: ORTHOPEDICS | Facility: CLINIC | Age: 59
End: 2024-11-20
Payer: MEDICARE

## 2024-11-20 ENCOUNTER — HOSPITAL ENCOUNTER (OUTPATIENT)
Dept: RADIOLOGY | Facility: HOSPITAL | Age: 59
Discharge: HOME | End: 2024-11-20
Attending: ORTHOPAEDIC SURGERY
Payer: MEDICARE

## 2024-11-20 DIAGNOSIS — M25.522 LEFT ELBOW PAIN: Primary | ICD-10-CM

## 2024-11-20 DIAGNOSIS — S42.291A HUMERAL HEAD FRACTURE, RIGHT, CLOSED, INITIAL ENCOUNTER: Primary | ICD-10-CM

## 2024-11-20 DIAGNOSIS — S42.92XG CLOSED FRACTURE OF LEFT SHOULDER WITH DELAYED HEALING, SUBSEQUENT ENCOUNTER: ICD-10-CM

## 2024-11-20 DIAGNOSIS — S42.291A HUMERAL HEAD FRACTURE, RIGHT, CLOSED, INITIAL ENCOUNTER: ICD-10-CM

## 2024-11-20 DIAGNOSIS — M25.522 LEFT ELBOW PAIN: ICD-10-CM

## 2024-11-20 PROCEDURE — 99214 OFFICE O/P EST MOD 30 MIN: CPT | Mod: PBBFAC,25 | Performed by: ORTHOPAEDIC SURGERY

## 2024-11-20 PROCEDURE — 99999 PR PBB SHADOW E&M-EST. PATIENT-LVL IV: CPT | Mod: PBBFAC,,, | Performed by: ORTHOPAEDIC SURGERY

## 2024-11-20 PROCEDURE — 73070 X-RAY EXAM OF ELBOW: CPT | Mod: TC,LT

## 2024-11-20 PROCEDURE — 73030 X-RAY EXAM OF SHOULDER: CPT | Mod: TC,RT

## 2024-11-20 RX ORDER — HYDROCODONE BITARTRATE AND ACETAMINOPHEN 7.5; 325 MG/1; MG/1
1 TABLET ORAL EVERY 6 HOURS PRN
Qty: 28 TABLET | Refills: 0 | Status: SHIPPED | OUTPATIENT
Start: 2024-11-20

## 2024-11-20 NOTE — PROGRESS NOTES
CLINIC NOTE       Chief Complaint   Patient presents with    Right Upper Arm - Follow-up        Kirk Villar is a 59 y.o. female seen today for recheck of her right shoulder.  She was sustained comminuted mildly displaced proximal humerus fracture.  She was referred to [facility] in seen 11/06/2024.  X-rays showed the fracture to remain in overall acceptable position alignment.  Repeat x-rays today however shows fragmentation of the humeral head and medial displacement of the shaft.  She was she was appears to be significantly osteopenic radiographically.  She was right-hand dominant.  She was she was became aware of some change with increased pain but denies active motion we will re-injury.  She was on Plavix      Past Medical History:   Diagnosis Date    ACC/AHA stage C congestive heart failure due to ischemic cardiomyopathy 03/12/2024    ACE inhibitor intolerance 03/12/2024    Alcohol dependence with uncomplicated withdrawal 07/09/2024    CAD (coronary artery disease) 07/09/2024    Chronic deep vein thrombosis (DVT) of proximal vein of lower extremity, unspecified laterality 09/03/2024    CKD stage 3b, GFR 30-44 ml/min 07/11/2024    Diabetes mellitus, type 2     Episode of recurrent major depressive disorder, unspecified depression episode severity 09/03/2024    Factor 5 Leiden mutation, heterozygous     Familial hyperlipidemia 03/12/2024    Plan to (re) initiate crestor  Did not tolerate lipitor       Family History   Problem Relation Name Age of Onset    Cancer Mother      Heart failure Father       Current Outpatient Medications on File Prior to Visit   Medication Sig Dispense Refill    albuterol (VENTOLIN HFA) 90 mcg/actuation inhaler Inhale 2 puffs into the lungs every 6 (six) hours as needed for Wheezing. Rescue 18 g 2    albuterol-ipratropium (DUO-NEB) 2.5 mg-0.5 mg/3 mL nebulizer solution Take 3 mLs by nebulization every 6 (six) hours as needed for Wheezing. Rescue 75 mL 0    blood sugar  diagnostic (ACCU-CHEK GUIDE TEST STRIPS) Strp 1 strip by Misc.(Non-Drug; Combo Route) route 3 (three) times daily. 100 strip 11    citalopram (CELEXA) 40 MG tablet TAKE 1 TABLET ONE TIME DAILY 90 tablet 3    clopidogreL (PLAVIX) 75 mg tablet TAKE 1 TABLET ONE TIME DAILY 90 tablet 3    cyclobenzaprine (FLEXERIL) 10 MG tablet TAKE 1 TABLET TWICE DAILY AS NEEDED FOR MUSCLE SPASM(S) 60 tablet 11    dapagliflozin propanediol (FARXIGA) 5 mg Tab tablet Take 1 tablet (5 mg total) by mouth once daily. 90 tablet 1    docusate sodium (COLACE) 100 MG capsule Take 2 capsules (200 mg total) by mouth once daily. 60 capsule 2    ezetimibe (ZETIA) 10 mg tablet Take 1 tablet (10 mg total) by mouth once daily. 90 tablet 2    fluconazole (DIFLUCAN) 150 MG Tab Take 1 tablet by mouth now, then repeat on day 3 2 tablet 0    furosemide (LASIX) 40 MG tablet Take 1 tablet (40 mg total) by mouth once daily. 90 tablet 3    gabapentin (NEURONTIN) 800 MG tablet TAKE 1 TABLET FOUR TIMES DAILY 120 tablet 11    HYDROcodone-acetaminophen (NORCO) 5-325 mg per tablet Take 1 tablet by mouth every 6 (six) hours as needed for Pain. 28 tablet 0    HYDROcodone-acetaminophen (NORCO) 7.5-325 mg per tablet Take 1 tablet by mouth every 6 (six) hours as needed for Pain. 28 tablet 0    insulin aspart U-100 (NOVOLOG FLEXPEN U-100 INSULIN) 100 unit/mL (3 mL) InPn pen Inject 5-10 minutes before meals per sliding scale 2 each 2    insulin degludec (TRESIBA FLEXTOUCH U-100) 100 unit/mL (3 mL) insulin pen Inject 20 Units into the skin every evening. 6 mL 11    metoprolol succinate (TOPROL-XL) 25 MG 24 hr tablet Take 1 tablet (25 mg total) by mouth 2 (two) times daily. 180 tablet 2    mupirocin (BACTROBAN) 2 % ointment Apply topically 2 (two) times daily. 15 g 2    nicotine (NICODERM CQ) 7 mg/24 hr Place 1 patch onto the skin once daily. 14 patch 6    rivaroxaban (XARELTO) 20 mg Tab Take 1 tablet (20 mg total) by mouth daily with dinner or evening meal. 90 tablet 2     rosuvastatin (CRESTOR) 20 MG tablet Take 1 tablet (20 mg total) by mouth once daily. 90 tablet 1    sacubitriL-valsartan (ENTRESTO) 24-26 mg per tablet Take 1 tablet by mouth 2 (two) times daily. 180 tablet 1    traZODone (DESYREL) 100 MG tablet TAKE 1 TABLET EVERY EVENING 90 tablet 3     No current facility-administered medications on file prior to visit.       ROS     There were no vitals filed for this visit.    History reviewed. No pertinent surgical history.     Review of patient's allergies indicates:  No Known Allergies     Ortho Exam :  Right shoulder is normal contour with soft tissue swelling and ecchymosis.  Radiographic Examination:  Right shoulder 11/20/2024    Technique:  Two views AP and lateral scapular    Findings:  He was generalized osteopenia.  Comminuted fracture of the humeral head is present with medial displacement of the shaft fragment    Impression:   See Above  Radiographic examination 11/20/2024 left elbow  Technique: Two views AP and lateral projection  Findings:  Bones are adequately mineralized.  The radiocapitellar and ulnar humeral joints are located.  He was no evidence of fracture, dislocation or pathologic bone seen.  Impression:  He was he was  Assessment and Plan  Patient Active Problem List    Diagnosis Date Noted    Chronic deep vein thrombosis (DVT) of proximal vein of lower extremity, unspecified laterality 09/03/2024    Episode of recurrent major depressive disorder, unspecified depression episode severity 09/03/2024    CKD stage 3b, GFR 30-44 ml/min 07/11/2024    Acute on chronic HFrEF (heart failure with reduced ejection fraction) 07/09/2024    Factor 5 Leiden mutation, heterozygous 07/09/2024    Alcohol dependence with uncomplicated withdrawal 07/09/2024    CAD (coronary artery disease) 07/09/2024    Heart failure with mid-range ejection fraction (HFmEF) 03/12/2024    Uncontrolled type 2 diabetes mellitus with hyperglycemia 03/12/2024    ACC/AHA stage C congestive heart  failure due to ischemic cardiomyopathy 03/12/2024    Familial hyperlipidemia 03/12/2024    Impression:  Comminuted displaced right proximal humerus fracture.    Plan:  Discussed treatment options to include ORIF with plate and screws versus reverse total shoulder replacement arthroplasty.  Given her fracture pattern in osteopenia believe ORIF we would have a high failure rate.  We have agreed to proceed with reverse total shoulder replacement arthroplasty.  She was on Plavix and we will need permission to discontinue prior to surgery next week.  She was had some pain in her elbow but he was not up before.  We will x-ray of the right elbow today as well.      Miguel Meyer M.D.

## 2024-11-21 DIAGNOSIS — S42.291A HUMERAL HEAD FRACTURE, RIGHT, CLOSED, INITIAL ENCOUNTER: ICD-10-CM

## 2024-11-21 DIAGNOSIS — Z01.812 ENCOUNTER FOR PREPROCEDURAL LABORATORY EXAMINATION: Primary | ICD-10-CM

## 2024-11-22 ENCOUNTER — HOSPITAL ENCOUNTER (OUTPATIENT)
Dept: RADIOLOGY | Facility: HOSPITAL | Age: 59
Discharge: HOME | End: 2024-11-22
Attending: ORTHOPAEDIC SURGERY
Payer: MEDICARE

## 2024-11-22 ENCOUNTER — OFFICE VISIT (OUTPATIENT)
Dept: CARDIOLOGY | Facility: CLINIC | Age: 59
End: 2024-11-22
Payer: MEDICARE

## 2024-11-22 VITALS
HEIGHT: 66 IN | HEART RATE: 77 BPM | SYSTOLIC BLOOD PRESSURE: 110 MMHG | OXYGEN SATURATION: 90 % | BODY MASS INDEX: 36.58 KG/M2 | DIASTOLIC BLOOD PRESSURE: 82 MMHG | WEIGHT: 227.63 LBS

## 2024-11-22 DIAGNOSIS — E11.59 HYPERTENSION ASSOCIATED WITH DIABETES: ICD-10-CM

## 2024-11-22 DIAGNOSIS — I73.9 CLAUDICATION: Primary | ICD-10-CM

## 2024-11-22 DIAGNOSIS — I15.2 HYPERTENSION ASSOCIATED WITH DIABETES: ICD-10-CM

## 2024-11-22 DIAGNOSIS — S42.291A HUMERAL HEAD FRACTURE, RIGHT, CLOSED, INITIAL ENCOUNTER: ICD-10-CM

## 2024-11-22 DIAGNOSIS — Z13.9 RISK AND FUNCTIONAL ASSESSMENT: ICD-10-CM

## 2024-11-22 DIAGNOSIS — Z71.89 ENCOUNTER FOR CARDIAC RISK COUNSELING: ICD-10-CM

## 2024-11-22 DIAGNOSIS — I73.9 CLAUDICATION, CLASS IV: ICD-10-CM

## 2024-11-22 DIAGNOSIS — R60.1 GENERALIZED EDEMA: ICD-10-CM

## 2024-11-22 DIAGNOSIS — Z01.812 ENCOUNTER FOR PREPROCEDURAL LABORATORY EXAMINATION: ICD-10-CM

## 2024-11-22 PROBLEM — S42.92XA CLOSED FRACTURE OF LEFT SHOULDER: Status: ACTIVE | Noted: 2024-11-22

## 2024-11-22 PROCEDURE — 3079F DIAST BP 80-89 MM HG: CPT | Mod: CPTII,,, | Performed by: HOSPITALIST

## 2024-11-22 PROCEDURE — 99999 PR PBB SHADOW E&M-EST. PATIENT-LVL V: CPT | Mod: PBBFAC,,, | Performed by: HOSPITALIST

## 2024-11-22 PROCEDURE — 3008F BODY MASS INDEX DOCD: CPT | Mod: CPTII,,, | Performed by: HOSPITALIST

## 2024-11-22 PROCEDURE — 99215 OFFICE O/P EST HI 40 MIN: CPT | Mod: S$PBB,,, | Performed by: HOSPITALIST

## 2024-11-22 PROCEDURE — 4010F ACE/ARB THERAPY RXD/TAKEN: CPT | Mod: CPTII,,, | Performed by: HOSPITALIST

## 2024-11-22 PROCEDURE — 73200 CT UPPER EXTREMITY W/O DYE: CPT | Mod: TC,RT

## 2024-11-22 PROCEDURE — 71046 X-RAY EXAM CHEST 2 VIEWS: CPT | Mod: 26,,, | Performed by: RADIOLOGY

## 2024-11-22 PROCEDURE — 3074F SYST BP LT 130 MM HG: CPT | Mod: CPTII,,, | Performed by: HOSPITALIST

## 2024-11-22 PROCEDURE — 3046F HEMOGLOBIN A1C LEVEL >9.0%: CPT | Mod: CPTII,,, | Performed by: HOSPITALIST

## 2024-11-22 PROCEDURE — 71046 X-RAY EXAM CHEST 2 VIEWS: CPT | Mod: TC

## 2024-11-22 PROCEDURE — 73200 CT UPPER EXTREMITY W/O DYE: CPT | Mod: 26,RT,, | Performed by: RADIOLOGY

## 2024-11-22 PROCEDURE — 99215 OFFICE O/P EST HI 40 MIN: CPT | Mod: PBBFAC | Performed by: HOSPITALIST

## 2024-11-22 NOTE — PATIENT INSTRUCTIONS
Ultrasound of legs: December 4th @ 10:00.   Check in at Imaging Department.   Follow up with  02/26/2025 @ 1:40 pm

## 2024-11-22 NOTE — PROGRESS NOTES
CARDIOVASCULAR CONSULTATION        REASON FOR CONSULT:   Kirk Villar is a 59 y.o. female who presents for   Chief Complaint   Patient presents with    Follow-up     Cardiac risk assessment for surgery for shoulder tuesday    Edema     Entire body from not being able to move much    Chest Pain     W/O exertion. Stabbing shooting pain through left breast area. Sometimes lasts 1 min or more. On and off but usually couple times a day if it happens.           HISTORY OF PRESENT ILLNESS:   59 y.o. female who  has a past medical history of ACC/AHA stage C congestive heart failure due to ischemic cardiomyopathy, ACE inhibitor intolerance, Alcohol dependence with uncomplicated withdrawal, Broken shoulder, CAD (coronary artery disease), Chronic deep vein thrombosis (DVT) of proximal vein of lower extremity, unspecified laterality, CKD stage 3b, GFR 30-44 ml/min, Diabetes mellitus, type 2, Episode of recurrent major depressive disorder, unspecified depression episode severity, Factor 5 Leiden mutation, heterozygous, and Familial hyperlipidemia.    Today we discussed the patient's cardiac symptoms at length. She recently shattered her humerus and is wearing a sling in office. She stated ortho planned to operate on Tuesday. We discuss risk and claudication, which came up in her risk assessment (has functionally limiting bilateral thigh/calf; not knee/hip pain)  -discussed SHARI w/ seg US; patient amenable  -if positive, schedule CTA of BLEs w/ runoff; will discuss/consent for Peripheral angio/fix if appropriate    PAST MEDICAL HISTORY:     Past Medical History:   Diagnosis Date    ACC/AHA stage C congestive heart failure due to ischemic cardiomyopathy 03/12/2024    ACE inhibitor intolerance 03/12/2024    Alcohol dependence with uncomplicated withdrawal 07/09/2024    Broken shoulder 11/02/2024    right shoulder    CAD (coronary artery disease) 07/09/2024    Chronic deep vein thrombosis (DVT) of proximal vein of lower  extremity, unspecified laterality 09/03/2024    CKD stage 3b, GFR 30-44 ml/min 07/11/2024    Diabetes mellitus, type 2     Episode of recurrent major depressive disorder, unspecified depression episode severity 09/03/2024    Factor 5 Leiden mutation, heterozygous     Familial hyperlipidemia 03/12/2024    Plan to (re) initiate crestor  Did not tolerate lipitor         PAST SURGICAL HISTORY:   History reviewed. No pertinent surgical history.    ALLERGIES AND MEDICATION:   Review of patient's allergies indicates:  No Known Allergies     Medication List            Accurate as of November 22, 2024 10:31 AM. If you have any questions, ask your nurse or doctor.                CONTINUE taking these medications      albuterol 90 mcg/actuation inhaler  Commonly known as: VENTOLIN HFA  Inhale 2 puffs into the lungs every 6 (six) hours as needed for Wheezing. Rescue     albuterol-ipratropium 2.5 mg-0.5 mg/3 mL nebulizer solution  Commonly known as: DUO-NEB  Take 3 mLs by nebulization every 6 (six) hours as needed for Wheezing. Rescue     blood sugar diagnostic Strp  Commonly known as: ACCU-CHEK GUIDE TEST STRIPS  1 strip by Misc.(Non-Drug; Combo Route) route 3 (three) times daily.     citalopram 40 MG tablet  Commonly known as: CeleXA  TAKE 1 TABLET ONE TIME DAILY     clopidogreL 75 mg tablet  Commonly known as: PLAVIX  TAKE 1 TABLET ONE TIME DAILY     cyclobenzaprine 10 MG tablet  Commonly known as: FLEXERIL  TAKE 1 TABLET TWICE DAILY AS NEEDED FOR MUSCLE SPASM(S)     dapagliflozin propanediol 5 mg Tab tablet  Commonly known as: FARXIGA  Take 1 tablet (5 mg total) by mouth once daily.     docusate sodium 100 MG capsule  Commonly known as: COLACE  Take 2 capsules (200 mg total) by mouth once daily.     ENTRESTO 24-26 mg per tablet  Generic drug: sacubitriL-valsartan  Take 1 tablet by mouth 2 (two) times daily.     ezetimibe 10 mg tablet  Commonly known as: ZETIA  Take 1 tablet (10 mg total) by mouth once daily.     furosemide  40 MG tablet  Commonly known as: LASIX  Take 1 tablet (40 mg total) by mouth once daily.     gabapentin 800 MG tablet  Commonly known as: NEURONTIN  TAKE 1 TABLET FOUR TIMES DAILY     * HYDROcodone-acetaminophen 5-325 mg per tablet  Commonly known as: NORCO  Take 1 tablet by mouth every 6 (six) hours as needed for Pain.     * HYDROcodone-acetaminophen 7.5-325 mg per tablet  Commonly known as: NORCO  Take 1 tablet by mouth every 6 (six) hours as needed for Pain.     insulin aspart U-100 100 unit/mL (3 mL) Inpn pen  Commonly known as: NovoLOG Flexpen U-100 Insulin  Inject 5-10 minutes before meals per sliding scale     insulin degludec 100 unit/mL (3 mL) insulin pen  Commonly known as: TRESIBA FLEXTOUCH U-100  Inject 20 Units into the skin every evening.     metoprolol succinate 25 MG 24 hr tablet  Commonly known as: TOPROL-XL  Take 1 tablet (25 mg total) by mouth 2 (two) times daily.     mupirocin 2 % ointment  Commonly known as: BACTROBAN  Apply topically 2 (two) times daily.     rivaroxaban 20 mg Tab  Commonly known as: XARELTO  Take 1 tablet (20 mg total) by mouth daily with dinner or evening meal.     rosuvastatin 20 MG tablet  Commonly known as: CRESTOR  Take 1 tablet (20 mg total) by mouth once daily.     traZODone 100 MG tablet  Commonly known as: DESYREL  TAKE 1 TABLET EVERY EVENING           * This list has 2 medication(s) that are the same as other medications prescribed for you. Read the directions carefully, and ask your doctor or other care provider to review them with you.                  SOCIAL HISTORY:     Social History     Socioeconomic History    Marital status: Unknown   Tobacco Use    Smoking status: Every Day     Current packs/day: 0.25     Average packs/day: 0.5 packs/day for 49.9 years (24.7 ttl pk-yrs)     Types: Cigarettes     Start date: 1975    Smokeless tobacco: Never    Tobacco comments:     Patient states 4 singles a day   Substance and Sexual Activity    Alcohol use: Yes     Comment:  "occasionally    Drug use: Never    Sexual activity: Not Currently     Social Drivers of Health     Financial Resource Strain: Medium Risk (8/7/2024)    Overall Financial Resource Strain (CARDIA)     Difficulty of Paying Living Expenses: Somewhat hard   Food Insecurity: Food Insecurity Present (8/7/2024)    Hunger Vital Sign     Worried About Running Out of Food in the Last Year: Sometimes true     Ran Out of Food in the Last Year: Sometimes true   Transportation Needs: No Transportation Needs (7/9/2024)    TRANSPORTATION NEEDS     Transportation : No   Physical Activity: Inactive (8/7/2024)    Exercise Vital Sign     Days of Exercise per Week: 0 days     Minutes of Exercise per Session: 10 min   Stress: Stress Concern Present (8/7/2024)    [employer] - Occupational Stress Questionnaire     Feeling of Stress : Very much   Housing Stability: High Risk (8/7/2024)    Housing Stability Vital Sign     Unable to Pay for Housing in the Last Year: Yes     Homeless in the Last Year: No       FAMILY HISTORY:     Family History   Problem Relation Name Age of Onset    Cancer Mother      Heart failure Father         REVIEW OF SYSTEMS:       Cardiology focused 12-point ROS otherwise unremarkable except for as mentioned in HPI and A/P.   Pertinent Positives and Negatives documented herein.       PHYSICAL EXAM:     Vitals:    11/22/24 0907   BP: 110/82   Pulse: 77    Body mass index is 36.74 kg/m².  Weight: 103.2 kg (227 lb 9.6 oz)   Height: 5' 6" (167.6 cm)     Gen: NAD  HEENT: NC/AT, MMM  Chest: normal wob  CV: RRR, no m/g/r  Ext: mild/trace edema of BLEs  Skin: no rash  Psych: AMAA  Neuro: CNGI      DATA:     Laboratory:  CBC:  Recent Labs   Lab 07/11/24  0454 07/12/24  0507 08/08/24  1130   WBC 10.29 10.19 5.58   Hemoglobin 11.7 L 12.1 12.5   Hematocrit 38.0 38.4 39.3   Platelet Count 223 213 275       CHEMISTRIES:  Recent Labs   Lab 07/10/24  0359 07/11/24  0454 07/12/24  0506 08/08/24  1130 " "  Glucose 397 H 380 H 162 H 177 H   Sodium 135 L 138 141 138   Potassium 4.5 4.2 3.5 4.4   BUN 28 H 34 H 33 H 16   Creatinine 1.45 H 1.66 H 1.34 H 1.85 H   Calcium 8.9 9.2 9.3 9.2   Magnesium 2.0  --   --   --        CARDIAC BIOMARKERS:      No results found for: "BNP"    COAGS:        LIPIDS/LFTS:  Recent Labs   Lab 11/30/22  1400 07/08/24  2040 07/12/24  0506 08/08/24  1130   Cholesterol 348 H  --   --  192   Triglycerides 449 H  --   --  201 H   HDL Cholesterol 45  --   --  61 H   LDL Calculated  --   --   --  91   Non-  --   --  131   AST 8 L 32 10 L 19   ALT 20 25 17 19       Hemoglobin A1C   Date Value Ref Range Status   07/09/2024 10.0 (H) 4.5 - 6.6 % Final     Comment:       Normal:               <5.7%  Pre-Diabetic:       5.7% to 6.4%  Diabetic:             >6.4%  Diabetic Goal:     <7%   11/30/2022 11.6 (H) 4.5 - 6.6 % Final     Comment:       Normal:               <5.7%  Pre-Diabetic:       5.7% to 6.4%  Diabetic:             >6.4%  Diabetic Goal:     <7%       TSH  Recent Labs   Lab 11/30/22  1400 08/08/24  1130   TSH 0.361 1.300       The 10-year ASCVD risk score (Laura WAGNER, et al., 2019) is: 12.7%    Values used to calculate the score:      Age: 59 years      Sex: Female      Is Non- : No      Diabetic: Yes      Tobacco smoker: Yes      Systolic Blood Pressure: 110 mmHg      Is BP treated: Yes      HDL Cholesterol: 61 mg/dL      Total Cholesterol: 192 mg/dL     IMAGING  Radiology US Lower Extremity Arteries Bilateral    (Results Pending)       ASSESSMENT AND PLAN     Patient Active Problem List   Diagnosis    Heart failure with mid-range ejection fraction (HFmEF)    Uncontrolled type 2 diabetes mellitus with hyperglycemia    ACC/AHA stage C congestive heart failure due to ischemic cardiomyopathy    Familial hyperlipidemia    Acute on chronic HFrEF (heart failure with reduced ejection fraction)    Factor 5 Leiden mutation, heterozygous    Alcohol dependence with " uncomplicated withdrawal    CAD (coronary artery disease)    CKD stage 3b, GFR 30-44 ml/min    Chronic deep vein thrombosis (DVT) of proximal vein of lower extremity, unspecified laterality    Episode of recurrent major depressive disorder, unspecified depression episode severity    Encounter for cardiac risk counseling    Claudication, class IV         Orders Placed This Encounter   Procedures    Radiology US Lower Extremity Arteries Bilateral    EKG 12-lead       1. Hypertension associated with diabetes  - EKG 12-lead; Future  - EKG 12-lead    2. Generalized edema  - Radiology US Lower Extremity Arteries Bilateral; Future    3. Risk and functional assessment    4. Claudication  - Radiology US Lower Extremity Arteries Bilateral; Future    5. Encounter for cardiac risk counseling    6. Claudication, class IV                This note was dictated with the help of speech recognition software.  There might be un-intended errors and/or substitutions.

## 2024-11-26 ENCOUNTER — HOSPITAL ENCOUNTER (OUTPATIENT)
Facility: HOSPITAL | Age: 59
Discharge: HOME | End: 2024-11-27
Attending: ORTHOPAEDIC SURGERY | Admitting: ORTHOPAEDIC SURGERY
Payer: MEDICARE

## 2024-11-26 ENCOUNTER — ANESTHESIA (OUTPATIENT)
Dept: SURGERY | Facility: HOSPITAL | Age: 59
End: 2024-11-26
Payer: MEDICARE

## 2024-11-26 ENCOUNTER — ANESTHESIA EVENT (OUTPATIENT)
Dept: SURGERY | Facility: HOSPITAL | Age: 59
End: 2024-11-26
Payer: MEDICARE

## 2024-11-26 DIAGNOSIS — S42.92XA CLOSED FRACTURE OF LEFT SHOULDER, INITIAL ENCOUNTER: Primary | ICD-10-CM

## 2024-11-26 DIAGNOSIS — S42.92XA: ICD-10-CM

## 2024-11-26 PROBLEM — J44.1 COPD EXACERBATION: Status: ACTIVE | Noted: 2024-11-26

## 2024-11-26 LAB
ANION GAP SERPL CALCULATED.3IONS-SCNC: 14 MMOL/L (ref 7–16)
BASOPHILS # BLD AUTO: 0.02 K/UL (ref 0–0.2)
BASOPHILS NFR BLD AUTO: 0.3 % (ref 0–1)
BUN SERPL-MCNC: 13 MG/DL (ref 10–20)
BUN/CREAT SERPL: 12 (ref 6–20)
CALCIUM SERPL-MCNC: 8.6 MG/DL (ref 8.4–10.2)
CHLORIDE SERPL-SCNC: 100 MMOL/L (ref 98–107)
CO2 SERPL-SCNC: 31 MMOL/L (ref 23–31)
CREAT SERPL-MCNC: 1.05 MG/DL (ref 0.55–1.02)
DIFFERENTIAL METHOD BLD: ABNORMAL
EGFR (NO RACE VARIABLE) (RUSH/TITUS): 61 ML/MIN/1.73M2
EOSINOPHIL # BLD AUTO: 0 K/UL (ref 0–0.5)
EOSINOPHIL NFR BLD AUTO: 0 % (ref 1–4)
ERYTHROCYTE [DISTWIDTH] IN BLOOD BY AUTOMATED COUNT: 13.7 % (ref 11.5–14.5)
GLUCOSE SERPL-MCNC: 250 MG/DL (ref 70–105)
GLUCOSE SERPL-MCNC: 253 MG/DL (ref 82–115)
GLUCOSE SERPL-MCNC: 264 MG/DL (ref 70–105)
GLUCOSE SERPL-MCNC: 273 MG/DL (ref 70–105)
GLUCOSE SERPL-MCNC: 322 MG/DL (ref 70–105)
GLUCOSE SERPL-MCNC: 362 MG/DL (ref 70–105)
GLUCOSE SERPL-MCNC: 384 MG/DL (ref 70–105)
HCT VFR BLD AUTO: 33.2 % (ref 38–47)
HGB BLD-MCNC: 10 G/DL (ref 12–16)
IMM GRANULOCYTES # BLD AUTO: 0.03 K/UL (ref 0–0.04)
IMM GRANULOCYTES NFR BLD: 0.4 % (ref 0–0.4)
INDIRECT COOMBS: NORMAL
LYMPHOCYTES # BLD AUTO: 0.4 K/UL (ref 1–4.8)
LYMPHOCYTES NFR BLD AUTO: 5 % (ref 27–41)
MCH RBC QN AUTO: 28.7 PG (ref 27–31)
MCHC RBC AUTO-ENTMCNC: 30.1 G/DL (ref 32–36)
MCV RBC AUTO: 95.4 FL (ref 80–96)
MONOCYTES # BLD AUTO: 0.14 K/UL (ref 0–0.8)
MONOCYTES NFR BLD AUTO: 1.8 % (ref 2–6)
MPC BLD CALC-MCNC: 10.3 FL (ref 9.4–12.4)
NEUTROPHILS # BLD AUTO: 7.38 K/UL (ref 1.8–7.7)
NEUTROPHILS NFR BLD AUTO: 92.5 % (ref 53–65)
NRBC # BLD AUTO: 0 X10E3/UL
NRBC, AUTO (.00): 0 %
PLATELET # BLD AUTO: 297 K/UL (ref 150–400)
POTASSIUM SERPL-SCNC: 3.8 MMOL/L (ref 3.5–5.1)
RBC # BLD AUTO: 3.48 M/UL (ref 4.2–5.4)
RH BLD: NORMAL
SODIUM SERPL-SCNC: 141 MMOL/L (ref 136–145)
SPECIMEN OUTDATE: NORMAL
WBC # BLD AUTO: 7.97 K/UL (ref 4.5–11)

## 2024-11-26 PROCEDURE — 27000716 HC OXISENSOR PROBE, ANY SIZE: Performed by: ANESTHESIOLOGY

## 2024-11-26 PROCEDURE — 94640 AIRWAY INHALATION TREATMENT: CPT | Mod: XB

## 2024-11-26 PROCEDURE — 63600175 PHARM REV CODE 636 W HCPCS

## 2024-11-26 PROCEDURE — 36000711: Performed by: ORTHOPAEDIC SURGERY

## 2024-11-26 PROCEDURE — C1713 ANCHOR/SCREW BN/BN,TIS/BN: HCPCS | Performed by: ORTHOPAEDIC SURGERY

## 2024-11-26 PROCEDURE — C1769 GUIDE WIRE: HCPCS | Performed by: ORTHOPAEDIC SURGERY

## 2024-11-26 PROCEDURE — 27000758 HC SPIROMETER

## 2024-11-26 PROCEDURE — 82962 GLUCOSE BLOOD TEST: CPT

## 2024-11-26 PROCEDURE — 63600175 PHARM REV CODE 636 W HCPCS: Performed by: NURSE ANESTHETIST, CERTIFIED REGISTERED

## 2024-11-26 PROCEDURE — 25000242 PHARM REV CODE 250 ALT 637 W/ HCPCS: Performed by: NURSE ANESTHETIST, CERTIFIED REGISTERED

## 2024-11-26 PROCEDURE — 63600175 PHARM REV CODE 636 W HCPCS: Performed by: ORTHOPAEDIC SURGERY

## 2024-11-26 PROCEDURE — 27000510 HC BLANKET BAIR HUGGER ANY SIZE: Performed by: ANESTHESIOLOGY

## 2024-11-26 PROCEDURE — D9220A PRA ANESTHESIA: Mod: ANES,,, | Performed by: ANESTHESIOLOGY

## 2024-11-26 PROCEDURE — 99214 OFFICE O/P EST MOD 30 MIN: CPT | Mod: ,,, | Performed by: HOSPITALIST

## 2024-11-26 PROCEDURE — 71000033 HC RECOVERY, INTIAL HOUR: Performed by: ORTHOPAEDIC SURGERY

## 2024-11-26 PROCEDURE — 27000655: Performed by: ANESTHESIOLOGY

## 2024-11-26 PROCEDURE — 85025 COMPLETE CBC W/AUTO DIFF WBC: CPT | Performed by: ORTHOPAEDIC SURGERY

## 2024-11-26 PROCEDURE — 25000242 PHARM REV CODE 250 ALT 637 W/ HCPCS: Performed by: HOSPITALIST

## 2024-11-26 PROCEDURE — 36000710: Performed by: ORTHOPAEDIC SURGERY

## 2024-11-26 PROCEDURE — 36415 COLL VENOUS BLD VENIPUNCTURE: CPT | Performed by: ORTHOPAEDIC SURGERY

## 2024-11-26 PROCEDURE — 25000003 PHARM REV CODE 250: Performed by: ORTHOPAEDIC SURGERY

## 2024-11-26 PROCEDURE — 27000221 HC OXYGEN, UP TO 24 HOURS

## 2024-11-26 PROCEDURE — 63600175 PHARM REV CODE 636 W HCPCS: Performed by: ANESTHESIOLOGY

## 2024-11-26 PROCEDURE — 25000003 PHARM REV CODE 250: Performed by: HOSPITALIST

## 2024-11-26 PROCEDURE — 25000003 PHARM REV CODE 250: Performed by: NURSE ANESTHETIST, CERTIFIED REGISTERED

## 2024-11-26 PROCEDURE — C1776 JOINT DEVICE (IMPLANTABLE): HCPCS | Performed by: ORTHOPAEDIC SURGERY

## 2024-11-26 PROCEDURE — 27000165 HC TUBE, ETT CUFFED: Performed by: ANESTHESIOLOGY

## 2024-11-26 PROCEDURE — 36415 COLL VENOUS BLD VENIPUNCTURE: CPT | Mod: 91 | Performed by: ORTHOPAEDIC SURGERY

## 2024-11-26 PROCEDURE — 37000009 HC ANESTHESIA EA ADD 15 MINS: Performed by: ORTHOPAEDIC SURGERY

## 2024-11-26 PROCEDURE — 27000689 HC BLADE LARYNGOSCOPE ANY SIZE: Performed by: ANESTHESIOLOGY

## 2024-11-26 PROCEDURE — 80048 BASIC METABOLIC PNL TOTAL CA: CPT | Performed by: ORTHOPAEDIC SURGERY

## 2024-11-26 PROCEDURE — D9220A PRA ANESTHESIA: Mod: CRNA,,, | Performed by: NURSE ANESTHETIST, CERTIFIED REGISTERED

## 2024-11-26 PROCEDURE — 94799 UNLISTED PULMONARY SVC/PX: CPT

## 2024-11-26 PROCEDURE — 23472 RECONSTRUCT SHOULDER JOINT: CPT | Mod: 58,RT,, | Performed by: ORTHOPAEDIC SURGERY

## 2024-11-26 PROCEDURE — 64415 NJX AA&/STRD BRCH PLXS IMG: CPT | Mod: 59,RT,, | Performed by: ANESTHESIOLOGY

## 2024-11-26 PROCEDURE — 71000039 HC RECOVERY, EACH ADD'L HOUR: Performed by: ORTHOPAEDIC SURGERY

## 2024-11-26 PROCEDURE — 94761 N-INVAS EAR/PLS OXIMETRY MLT: CPT

## 2024-11-26 PROCEDURE — 25000242 PHARM REV CODE 250 ALT 637 W/ HCPCS: Performed by: ORTHOPAEDIC SURGERY

## 2024-11-26 PROCEDURE — 37000008 HC ANESTHESIA 1ST 15 MINUTES: Performed by: ORTHOPAEDIC SURGERY

## 2024-11-26 PROCEDURE — 99900035 HC TECH TIME PER 15 MIN (STAT)

## 2024-11-26 PROCEDURE — 27201423 OPTIME MED/SURG SUP & DEVICES STERILE SUPPLY: Performed by: ORTHOPAEDIC SURGERY

## 2024-11-26 PROCEDURE — 63600175 PHARM REV CODE 636 W HCPCS: Performed by: HOSPITALIST

## 2024-11-26 PROCEDURE — 86901 BLOOD TYPING SEROLOGIC RH(D): CPT | Performed by: ORTHOPAEDIC SURGERY

## 2024-11-26 DEVICE — X3 HUMERAL INSERT
Type: IMPLANTABLE DEVICE | Site: SHOULDER | Status: FUNCTIONAL
Brand: REUNION

## 2024-11-26 DEVICE — 4.5MM PERIPHERAL SCREW
Type: IMPLANTABLE DEVICE | Site: SHOULDER | Status: FUNCTIONAL
Brand: REUNION

## 2024-11-26 DEVICE — RFX HUMERAL STEM
Type: IMPLANTABLE DEVICE | Site: SHOULDER | Status: FUNCTIONAL
Brand: REUNION

## 2024-11-26 DEVICE — GLENOID BASEPLATE
Type: IMPLANTABLE DEVICE | Site: SHOULDER | Status: FUNCTIONAL
Brand: REUNION

## 2024-11-26 DEVICE — GLENOSPHERE , CONCENTRIC
Type: IMPLANTABLE DEVICE | Site: SHOULDER | Status: FUNCTIONAL
Brand: REUNION

## 2024-11-26 DEVICE — UNIVERSAL CEMENT RESTRICTOR
Type: IMPLANTABLE DEVICE | Site: SHOULDER | Status: FUNCTIONAL
Brand: RESTRICTOR

## 2024-11-26 DEVICE — 6.5MM CENTER SCREW
Type: IMPLANTABLE DEVICE | Site: SHOULDER | Status: FUNCTIONAL
Brand: REUNION

## 2024-11-26 DEVICE — CEMENT RESTRICTOR SIZE 2 10.75MM: Type: IMPLANTABLE DEVICE | Site: SHOULDER | Status: FUNCTIONAL

## 2024-11-26 DEVICE — SPEEDSET FULL DOSE ANTIBIOTIC BONE CEMENT, 10 PACK CATALOG NUMBER IS 6192-1-010
Type: IMPLANTABLE DEVICE | Site: SHOULDER | Status: FUNCTIONAL
Brand: SIMPLEX

## 2024-11-26 DEVICE — HUMERAL CUP
Type: IMPLANTABLE DEVICE | Site: SHOULDER | Status: FUNCTIONAL
Brand: REUNION

## 2024-11-26 RX ORDER — SODIUM CHLORIDE, SODIUM LACTATE, POTASSIUM CHLORIDE, CALCIUM CHLORIDE 600; 310; 30; 20 MG/100ML; MG/100ML; MG/100ML; MG/100ML
INJECTION, SOLUTION INTRAVENOUS CONTINUOUS
Status: DISCONTINUED | OUTPATIENT
Start: 2024-11-26 | End: 2024-11-27

## 2024-11-26 RX ORDER — DEXAMETHASONE SODIUM PHOSPHATE 4 MG/ML
INJECTION, SOLUTION INTRA-ARTICULAR; INTRALESIONAL; INTRAMUSCULAR; INTRAVENOUS; SOFT TISSUE
Status: DISCONTINUED | OUTPATIENT
Start: 2024-11-26 | End: 2024-11-26

## 2024-11-26 RX ORDER — DIPHENHYDRAMINE HCL 25 MG
50 CAPSULE ORAL EVERY 6 HOURS PRN
Status: DISCONTINUED | OUTPATIENT
Start: 2024-11-26 | End: 2024-11-27 | Stop reason: HOSPADM

## 2024-11-26 RX ORDER — TALC
6 POWDER (GRAM) TOPICAL NIGHTLY PRN
Status: DISCONTINUED | OUTPATIENT
Start: 2024-11-26 | End: 2024-11-27 | Stop reason: HOSPADM

## 2024-11-26 RX ORDER — ONDANSETRON HYDROCHLORIDE 2 MG/ML
4 INJECTION, SOLUTION INTRAVENOUS DAILY PRN
Status: DISCONTINUED | OUTPATIENT
Start: 2024-11-26 | End: 2024-11-26 | Stop reason: HOSPADM

## 2024-11-26 RX ORDER — ROCURONIUM BROMIDE 10 MG/ML
INJECTION, SOLUTION INTRAVENOUS
Status: DISCONTINUED | OUTPATIENT
Start: 2024-11-26 | End: 2024-11-26

## 2024-11-26 RX ORDER — IBUPROFEN 200 MG
16 TABLET ORAL
Status: DISCONTINUED | OUTPATIENT
Start: 2024-11-26 | End: 2024-11-27 | Stop reason: HOSPADM

## 2024-11-26 RX ORDER — ATORVASTATIN CALCIUM 40 MG/1
40 TABLET, FILM COATED ORAL DAILY
Status: DISCONTINUED | OUTPATIENT
Start: 2024-11-27 | End: 2024-11-27 | Stop reason: HOSPADM

## 2024-11-26 RX ORDER — ONDANSETRON HYDROCHLORIDE 2 MG/ML
8 INJECTION, SOLUTION INTRAVENOUS EVERY 6 HOURS PRN
Status: DISCONTINUED | OUTPATIENT
Start: 2024-11-26 | End: 2024-11-27 | Stop reason: HOSPADM

## 2024-11-26 RX ORDER — CYCLOBENZAPRINE HCL 10 MG
10 TABLET ORAL ONCE
Status: COMPLETED | OUTPATIENT
Start: 2024-11-26 | End: 2024-11-26

## 2024-11-26 RX ORDER — METOPROLOL SUCCINATE 25 MG/1
25 TABLET, EXTENDED RELEASE ORAL 2 TIMES DAILY
Status: DISCONTINUED | OUTPATIENT
Start: 2024-11-26 | End: 2024-11-27 | Stop reason: HOSPADM

## 2024-11-26 RX ORDER — IBUPROFEN 200 MG
24 TABLET ORAL
Status: DISCONTINUED | OUTPATIENT
Start: 2024-11-26 | End: 2024-11-27 | Stop reason: HOSPADM

## 2024-11-26 RX ORDER — LIDOCAINE HYDROCHLORIDE 20 MG/ML
INJECTION, SOLUTION EPIDURAL; INFILTRATION; INTRACAUDAL; PERINEURAL
Status: DISCONTINUED | OUTPATIENT
Start: 2024-11-26 | End: 2024-11-26

## 2024-11-26 RX ORDER — HYDROCODONE BITARTRATE AND ACETAMINOPHEN 10; 325 MG/1; MG/1
1 TABLET ORAL EVERY 4 HOURS PRN
Status: DISCONTINUED | OUTPATIENT
Start: 2024-11-26 | End: 2024-11-27 | Stop reason: HOSPADM

## 2024-11-26 RX ORDER — ALBUTEROL SULFATE 90 UG/1
INHALANT RESPIRATORY (INHALATION)
Status: DISCONTINUED | OUTPATIENT
Start: 2024-11-26 | End: 2024-11-26

## 2024-11-26 RX ORDER — MORPHINE SULFATE 10 MG/ML
4 INJECTION INTRAMUSCULAR; INTRAVENOUS; SUBCUTANEOUS EVERY 5 MIN PRN
Status: DISCONTINUED | OUTPATIENT
Start: 2024-11-26 | End: 2024-11-26 | Stop reason: HOSPADM

## 2024-11-26 RX ORDER — IPRATROPIUM BROMIDE AND ALBUTEROL SULFATE 2.5; .5 MG/3ML; MG/3ML
3 SOLUTION RESPIRATORY (INHALATION) EVERY 4 HOURS
Status: DISCONTINUED | OUTPATIENT
Start: 2024-11-26 | End: 2024-11-27 | Stop reason: HOSPADM

## 2024-11-26 RX ORDER — MORPHINE SULFATE 4 MG/ML
4 INJECTION, SOLUTION INTRAMUSCULAR; INTRAVENOUS ONCE
Status: COMPLETED | OUTPATIENT
Start: 2024-11-26 | End: 2024-11-26

## 2024-11-26 RX ORDER — BISACODYL 5 MG
10 TABLET, DELAYED RELEASE (ENTERIC COATED) ORAL DAILY PRN
Status: DISCONTINUED | OUTPATIENT
Start: 2024-11-26 | End: 2024-11-27 | Stop reason: HOSPADM

## 2024-11-26 RX ORDER — FUROSEMIDE 40 MG/1
40 TABLET ORAL DAILY
Status: DISCONTINUED | OUTPATIENT
Start: 2024-11-27 | End: 2024-11-27 | Stop reason: HOSPADM

## 2024-11-26 RX ORDER — ONDANSETRON HYDROCHLORIDE 2 MG/ML
4 INJECTION, SOLUTION INTRAVENOUS EVERY 8 HOURS PRN
Status: DISCONTINUED | OUTPATIENT
Start: 2024-11-26 | End: 2024-11-26

## 2024-11-26 RX ORDER — DIPHENHYDRAMINE HYDROCHLORIDE 50 MG/ML
25 INJECTION INTRAMUSCULAR; INTRAVENOUS EVERY 6 HOURS PRN
Status: DISCONTINUED | OUTPATIENT
Start: 2024-11-26 | End: 2024-11-26 | Stop reason: HOSPADM

## 2024-11-26 RX ORDER — PHENYLEPHRINE HYDROCHLORIDE 10 MG/ML
INJECTION INTRAVENOUS
Status: DISCONTINUED | OUTPATIENT
Start: 2024-11-26 | End: 2024-11-26

## 2024-11-26 RX ORDER — ROPIVACAINE HYDROCHLORIDE 7.5 MG/ML
INJECTION, SOLUTION EPIDURAL; PERINEURAL
Status: COMPLETED | OUTPATIENT
Start: 2024-11-26 | End: 2024-11-26

## 2024-11-26 RX ORDER — IPRATROPIUM BROMIDE AND ALBUTEROL SULFATE 2.5; .5 MG/3ML; MG/3ML
3 SOLUTION RESPIRATORY (INHALATION) ONCE
Status: COMPLETED | OUTPATIENT
Start: 2024-11-26 | End: 2024-11-26

## 2024-11-26 RX ORDER — MEPERIDINE HYDROCHLORIDE 25 MG/ML
25 INJECTION INTRAMUSCULAR; INTRAVENOUS; SUBCUTANEOUS EVERY 10 MIN PRN
Status: DISCONTINUED | OUTPATIENT
Start: 2024-11-26 | End: 2024-11-26 | Stop reason: HOSPADM

## 2024-11-26 RX ORDER — LEVOFLOXACIN 250 MG/1
500 TABLET ORAL DAILY
Status: DISCONTINUED | OUTPATIENT
Start: 2024-11-27 | End: 2024-11-27 | Stop reason: HOSPADM

## 2024-11-26 RX ORDER — ALUMINUM HYDROXIDE, MAGNESIUM HYDROXIDE, AND SIMETHICONE 2400; 240; 2400 MG/30ML; MG/30ML; MG/30ML
30 SUSPENSION ORAL EVERY 6 HOURS PRN
Status: DISCONTINUED | OUTPATIENT
Start: 2024-11-26 | End: 2024-11-27 | Stop reason: HOSPADM

## 2024-11-26 RX ORDER — GLUCAGON 1 MG
1 KIT INJECTION
Status: DISCONTINUED | OUTPATIENT
Start: 2024-11-26 | End: 2024-11-26 | Stop reason: HOSPADM

## 2024-11-26 RX ORDER — MORPHINE SULFATE 10 MG/ML
4 INJECTION INTRAMUSCULAR; INTRAVENOUS; SUBCUTANEOUS EVERY 4 HOURS PRN
Status: DISCONTINUED | OUTPATIENT
Start: 2024-11-26 | End: 2024-11-27

## 2024-11-26 RX ORDER — GUAIFENESIN AND DEXTROMETHORPHAN HYDROBROMIDE 10; 100 MG/5ML; MG/5ML
10 SYRUP ORAL EVERY 6 HOURS PRN
Status: DISCONTINUED | OUTPATIENT
Start: 2024-11-26 | End: 2024-11-27 | Stop reason: HOSPADM

## 2024-11-26 RX ORDER — FENTANYL CITRATE 50 UG/ML
INJECTION, SOLUTION INTRAMUSCULAR; INTRAVENOUS
Status: DISCONTINUED | OUTPATIENT
Start: 2024-11-26 | End: 2024-11-26

## 2024-11-26 RX ORDER — BISACODYL 10 MG/1
10 SUPPOSITORY RECTAL DAILY PRN
Status: DISCONTINUED | OUTPATIENT
Start: 2024-11-26 | End: 2024-11-27 | Stop reason: HOSPADM

## 2024-11-26 RX ORDER — SODIUM CHLORIDE, SODIUM LACTATE, POTASSIUM CHLORIDE, CALCIUM CHLORIDE 600; 310; 30; 20 MG/100ML; MG/100ML; MG/100ML; MG/100ML
INJECTION, SOLUTION INTRAVENOUS CONTINUOUS
Status: DISCONTINUED | OUTPATIENT
Start: 2024-11-26 | End: 2024-11-26

## 2024-11-26 RX ORDER — PROPOFOL 10 MG/ML
VIAL (ML) INTRAVENOUS
Status: DISCONTINUED | OUTPATIENT
Start: 2024-11-26 | End: 2024-11-26

## 2024-11-26 RX ORDER — GLUCAGON 1 MG
1 KIT INJECTION
Status: DISCONTINUED | OUTPATIENT
Start: 2024-11-26 | End: 2024-11-27 | Stop reason: HOSPADM

## 2024-11-26 RX ORDER — MIDAZOLAM HYDROCHLORIDE 1 MG/ML
INJECTION INTRAMUSCULAR; INTRAVENOUS
Status: DISCONTINUED | OUTPATIENT
Start: 2024-11-26 | End: 2024-11-26

## 2024-11-26 RX ORDER — ONDANSETRON HYDROCHLORIDE 2 MG/ML
INJECTION, SOLUTION INTRAVENOUS
Status: DISCONTINUED | OUTPATIENT
Start: 2024-11-26 | End: 2024-11-26

## 2024-11-26 RX ORDER — HYDROMORPHONE HYDROCHLORIDE 2 MG/ML
0.5 INJECTION, SOLUTION INTRAMUSCULAR; INTRAVENOUS; SUBCUTANEOUS EVERY 5 MIN PRN
Status: DISCONTINUED | OUTPATIENT
Start: 2024-11-26 | End: 2024-11-26 | Stop reason: HOSPADM

## 2024-11-26 RX ORDER — CEFAZOLIN SODIUM 1 G/3ML
2 INJECTION, POWDER, FOR SOLUTION INTRAMUSCULAR; INTRAVENOUS
Status: COMPLETED | OUTPATIENT
Start: 2024-11-26 | End: 2024-11-26

## 2024-11-26 RX ORDER — DOCUSATE SODIUM 100 MG/1
100 CAPSULE, LIQUID FILLED ORAL 2 TIMES DAILY PRN
Status: DISCONTINUED | OUTPATIENT
Start: 2024-11-26 | End: 2024-11-27 | Stop reason: HOSPADM

## 2024-11-26 RX ORDER — TRAZODONE HYDROCHLORIDE 50 MG/1
50 TABLET ORAL NIGHTLY PRN
Status: DISCONTINUED | OUTPATIENT
Start: 2024-11-26 | End: 2024-11-27 | Stop reason: HOSPADM

## 2024-11-26 RX ORDER — INSULIN ASPART 100 [IU]/ML
0-10 INJECTION, SOLUTION INTRAVENOUS; SUBCUTANEOUS
Status: DISCONTINUED | OUTPATIENT
Start: 2024-11-26 | End: 2024-11-27 | Stop reason: HOSPADM

## 2024-11-26 RX ADMIN — HYDROMORPHONE HYDROCHLORIDE 0.5 MG: 2 INJECTION INTRAMUSCULAR; INTRAVENOUS; SUBCUTANEOUS at 01:11

## 2024-11-26 RX ADMIN — LIDOCAINE HYDROCHLORIDE 50 MG: 20 INJECTION, SOLUTION INTRAVENOUS at 09:11

## 2024-11-26 RX ADMIN — CYCLOBENZAPRINE 10 MG: 10 TABLET, FILM COATED ORAL at 10:11

## 2024-11-26 RX ADMIN — FENTANYL CITRATE 100 MCG: 50 INJECTION, SOLUTION INTRAMUSCULAR; INTRAVENOUS at 09:11

## 2024-11-26 RX ADMIN — METHYLPREDNISOLONE SODIUM SUCCINATE 40 MG: 40 INJECTION, POWDER, FOR SOLUTION INTRAMUSCULAR; INTRAVENOUS at 07:11

## 2024-11-26 RX ADMIN — HUMAN INSULIN 8 UNITS: 100 INJECTION, SOLUTION SUBCUTANEOUS at 08:11

## 2024-11-26 RX ADMIN — HYDROCODONE BITARTRATE AND ACETAMINOPHEN 1 TABLET: 10; 325 TABLET ORAL at 09:11

## 2024-11-26 RX ADMIN — ROCURONIUM BROMIDE 50 MG: 10 INJECTION, SOLUTION INTRAVENOUS at 09:11

## 2024-11-26 RX ADMIN — IPRATROPIUM BROMIDE AND ALBUTEROL SULFATE 3 ML: .5; 3 SOLUTION RESPIRATORY (INHALATION) at 12:11

## 2024-11-26 RX ADMIN — SODIUM CHLORIDE: 9 INJECTION, SOLUTION INTRAVENOUS at 09:11

## 2024-11-26 RX ADMIN — METOPROLOL SUCCINATE 25 MG: 25 TABLET, EXTENDED RELEASE ORAL at 09:11

## 2024-11-26 RX ADMIN — CEFAZOLIN 2 G: 330 INJECTION, POWDER, FOR SOLUTION INTRAMUSCULAR; INTRAVENOUS at 09:11

## 2024-11-26 RX ADMIN — HYDROCODONE BITARTRATE AND ACETAMINOPHEN 1 TABLET: 10; 325 TABLET ORAL at 02:11

## 2024-11-26 RX ADMIN — ALBUTEROL SULFATE 2 PUFF: 108 INHALANT RESPIRATORY (INHALATION) at 12:11

## 2024-11-26 RX ADMIN — HUMAN INSULIN 8 UNITS: 100 INJECTION, SOLUTION SUBCUTANEOUS at 02:11

## 2024-11-26 RX ADMIN — DEXAMETHASONE SODIUM PHOSPHATE 4 MG: 4 INJECTION, SOLUTION INTRA-ARTICULAR; INTRALESIONAL; INTRAMUSCULAR; INTRAVENOUS; SOFT TISSUE at 09:11

## 2024-11-26 RX ADMIN — SUGAMMADEX 200 MG: 100 INJECTION, SOLUTION INTRAVENOUS at 12:11

## 2024-11-26 RX ADMIN — PROPOFOL 100 MG: 10 INJECTION, EMULSION INTRAVENOUS at 09:11

## 2024-11-26 RX ADMIN — VANCOMYCIN HYDROCHLORIDE 1000 MG: 1 INJECTION, POWDER, LYOPHILIZED, FOR SOLUTION INTRAVENOUS at 09:11

## 2024-11-26 RX ADMIN — ONDANSETRON 4 MG: 2 INJECTION INTRAMUSCULAR; INTRAVENOUS at 09:11

## 2024-11-26 RX ADMIN — PHENYLEPHRINE HYDROCHLORIDE 100 MCG: 10 INJECTION INTRAVENOUS at 09:11

## 2024-11-26 RX ADMIN — MIDAZOLAM HYDROCHLORIDE 2 MG: 1 INJECTION, SOLUTION INTRAMUSCULAR; INTRAVENOUS at 09:11

## 2024-11-26 RX ADMIN — TRAZODONE HYDROCHLORIDE 50 MG: 50 TABLET ORAL at 10:11

## 2024-11-26 RX ADMIN — IPRATROPIUM BROMIDE AND ALBUTEROL SULFATE 3 ML: .5; 3 SOLUTION RESPIRATORY (INHALATION) at 07:11

## 2024-11-26 RX ADMIN — MORPHINE SULFATE 4 MG: 4 INJECTION INTRAVENOUS at 07:11

## 2024-11-26 RX ADMIN — CEFAZOLIN 2 G: 330 INJECTION, POWDER, FOR SOLUTION INTRAMUSCULAR; INTRAVENOUS at 02:11

## 2024-11-26 RX ADMIN — INSULIN ASPART 5 UNITS: 100 INJECTION, SOLUTION INTRAVENOUS; SUBCUTANEOUS at 09:11

## 2024-11-26 RX ADMIN — ROCURONIUM BROMIDE 25 MG: 10 INJECTION, SOLUTION INTRAVENOUS at 10:11

## 2024-11-26 RX ADMIN — IPRATROPIUM BROMIDE AND ALBUTEROL SULFATE 3 ML: .5; 3 SOLUTION RESPIRATORY (INHALATION) at 11:11

## 2024-11-26 RX ADMIN — ROPIVACAINE HYDROCHLORIDE 40 ML: 7.5 INJECTION, SOLUTION EPIDURAL; PERINEURAL at 09:11

## 2024-11-26 RX ADMIN — SODIUM CHLORIDE 2 G: 9 INJECTION, SOLUTION INTRAVENOUS at 09:11

## 2024-11-26 NOTE — SUBJECTIVE & OBJECTIVE
Past Medical History:   Diagnosis Date    ACC/AHA stage C congestive heart failure due to ischemic cardiomyopathy 03/12/2024    ACE inhibitor intolerance 03/12/2024    Alcohol dependence with uncomplicated withdrawal 07/09/2024    Broken shoulder 11/02/2024    right shoulder    CAD (coronary artery disease) 07/09/2024    Chronic deep vein thrombosis (DVT) of proximal vein of lower extremity, unspecified laterality 09/03/2024    CKD stage 3b, GFR 30-44 ml/min 07/11/2024    Diabetes mellitus, type 2     Episode of recurrent major depressive disorder, unspecified depression episode severity 09/03/2024    Factor 5 Leiden mutation, heterozygous     Familial hyperlipidemia 03/12/2024    Plan to (re) initiate crestor  Did not tolerate lipitor         History reviewed. No pertinent surgical history.    Review of patient's allergies indicates:  No Known Allergies    No current facility-administered medications for this encounter.     Current Outpatient Medications   Medication Sig    albuterol (VENTOLIN HFA) 90 mcg/actuation inhaler Inhale 2 puffs into the lungs every 6 (six) hours as needed for Wheezing. Rescue    albuterol-ipratropium (DUO-NEB) 2.5 mg-0.5 mg/3 mL nebulizer solution Take 3 mLs by nebulization every 6 (six) hours as needed for Wheezing. Rescue    blood sugar diagnostic (ACCU-CHEK GUIDE TEST STRIPS) Strp 1 strip by Misc.(Non-Drug; Combo Route) route 3 (three) times daily.    citalopram (CELEXA) 40 MG tablet TAKE 1 TABLET ONE TIME DAILY    clopidogreL (PLAVIX) 75 mg tablet TAKE 1 TABLET ONE TIME DAILY    cyclobenzaprine (FLEXERIL) 10 MG tablet TAKE 1 TABLET TWICE DAILY AS NEEDED FOR MUSCLE SPASM(S)    dapagliflozin propanediol (FARXIGA) 5 mg Tab tablet Take 1 tablet (5 mg total) by mouth once daily.    docusate sodium (COLACE) 100 MG capsule Take 2 capsules (200 mg total) by mouth once daily.    ezetimibe (ZETIA) 10 mg tablet Take 1 tablet (10 mg total) by mouth once daily.    furosemide (LASIX) 40 MG tablet  Take 1 tablet (40 mg total) by mouth once daily.    gabapentin (NEURONTIN) 800 MG tablet TAKE 1 TABLET FOUR TIMES DAILY    HYDROcodone-acetaminophen (NORCO) 5-325 mg per tablet Take 1 tablet by mouth every 6 (six) hours as needed for Pain. (Patient not taking: Reported on 11/22/2024)    HYDROcodone-acetaminophen (NORCO) 7.5-325 mg per tablet Take 1 tablet by mouth every 6 (six) hours as needed for Pain.    insulin aspart U-100 (NOVOLOG FLEXPEN U-100 INSULIN) 100 unit/mL (3 mL) InPn pen Inject 5-10 minutes before meals per sliding scale    insulin degludec (TRESIBA FLEXTOUCH U-100) 100 unit/mL (3 mL) insulin pen Inject 20 Units into the skin every evening.    metoprolol succinate (TOPROL-XL) 25 MG 24 hr tablet Take 1 tablet (25 mg total) by mouth 2 (two) times daily.    mupirocin (BACTROBAN) 2 % ointment Apply topically 2 (two) times daily.    rivaroxaban (XARELTO) 20 mg Tab Take 1 tablet (20 mg total) by mouth daily with dinner or evening meal.    rosuvastatin (CRESTOR) 20 MG tablet Take 1 tablet (20 mg total) by mouth once daily.    sacubitriL-valsartan (ENTRESTO) 24-26 mg per tablet Take 1 tablet by mouth 2 (two) times daily.    traZODone (DESYREL) 100 MG tablet TAKE 1 TABLET EVERY EVENING     Family History       Problem Relation (Age of Onset)    Cancer Mother    Heart failure Father          Tobacco Use    Smoking status: Every Day     Current packs/day: 0.25     Average packs/day: 0.5 packs/day for 49.9 years (24.7 ttl pk-yrs)     Types: Cigarettes     Start date: 1975    Smokeless tobacco: Never    Tobacco comments:     Patient states 4 singles a day   Substance and Sexual Activity    Alcohol use: Yes     Comment: occasionally    Drug use: Never    Sexual activity: Not Currently     ROS  Objective:     Vital Signs (Most Recent):    Vital Signs (24h Range):  BP: ()/()   Arterial Line BP: ()/()            There is no height or weight on file to calculate BMI.    No intake or output data in the 24 hours ending  11/25/24 2040     General    Vitals reviewed.  Constitutional: She is oriented to person, place, and time. She appears well-developed and well-nourished.   HENT:   Head: Normocephalic and atraumatic.   Eyes: EOM are normal. Pupils are equal, round, and reactive to light.   Neck: Neck supple.   Cardiovascular:  Normal rate, regular rhythm and normal heart sounds.            Pulmonary/Chest: Effort normal and breath sounds normal.   Abdominal: Soft. Bowel sounds are normal.   Neurological: She is alert and oriented to person, place, and time.   Psychiatric: She has a normal mood and affect. Her behavior is normal.         Right Shoulder Exam   Right shoulder exam is normal.    Left Shoulder Exam     Tenderness   The patient is tender to palpation of the acromioclavicular joint, acromion and greater tuberosity.    Range of Motion   Active abduction:  abnormal   Passive abduction:  abnormal   Extension:  abnormal   Forward Flexion:  abnormal   Forward Elevation: abnormal  Adduction: abnormal    Muscle Strength   The patient has normal left shoulder strength.    Tests & Signs   Rotator Cuff Painful Arc/Range: moderate    Other   Sensation: normal       Vascular Exam       Left Pulses      Radial:                    2+      Capillary Refill  Left Hand: normal capillary refill           Significant Labs: All pertinent labs within the past 24 hours have been reviewed.    Significant Imaging: U/S: I have reviewed all pertinent results/findings and my personal findings are:  Displaced left proximal humerus fracture

## 2024-11-26 NOTE — PLAN OF CARE
[facility]  Initial Discharge Assessment       Primary Care Provider: Anabell Mendez NP    Admission Diagnosis: Humeral head fracture, right, closed, initial encounter [S42.291A]  Closed fracture of left shoulder [S42.92XA]    Admission Date: 11/26/2024  Expected Discharge Date:     Transition of Care Barriers: None    Payor: HUMANA MANAGED MEDICARE / Plan: HUMANA MEDICARE PPO / Product Type: Medicare Advantage /     Extended Emergency Contact Information  Primary Emergency Contact: lor rahman  Mobile Phone: [phone]  Relation: Friend  Preferred language: English   needed? No    Discharge Plan A: Home  Discharge Plan B: Home Health, Rehab, Skilled Nursing Facility, Long-term acute care facility (LTAC)      [facility] [address] [place], [place] [address] [address] [place] [id number]  Phone: [phone] Fax: [phone]    [facility] - [facility] [address]  [address] [facility] [address]  Phone: [phone] Fax: [phone]      Initial Assessment (most recent)       Adult Discharge Assessment - 11/26/24 1458          Discharge Assessment    Assessment Type Discharge Planning Assessment     Source of Information patient     Communicated EDWARD with patient/caregiver Yes     People in Home alone     Do you expect to return to your current living situation? Yes     Do you have help at home or someone to help you manage your care at home? No     Prior to hospitilization cognitive status: Alert/Oriented     Current cognitive status: Alert/Oriented     Walking or Climbing Stairs Difficulty no     Dressing/Bathing Difficulty no     Equipment Currently Used at Home nebulizer;cane, straight;rollator     Patient currently being followed by outpatient case management? No     Do you currently have service(s) that help you manage your care at home? No     Do you take prescription medications? Yes      Do you have prescription coverage? Yes     Coverage humana     Do you have any problems affording any of your prescribed medications? No     Is the patient taking medications as prescribed? yes     Who is going to help you get home at discharge? friend lor antony [phone]     How do you get to doctors appointments? car, drives self;family or friend will provide     Are you on dialysis? No     Do you take coumadin? No     Discharge Plan A Home     Discharge Plan B Home Health;Rehab;Skilled Nursing Facility;Long-term acute care facility (LTAC)     DME Needed Upon Discharge  none     Discharge Plan discussed with: Patient     Transition of Care Barriers None                   Consult for dc planning after routine ortho surgery. Spoke with patient in her room. She lives alone. She has a friend lor who lives close and helps as needed. She uses a cane at home prn. Has a rollator. No home services. Dc plan is home. SDOH completed by patient 3 months ago. Cm will follow.

## 2024-11-26 NOTE — H&P
[facility]  Orthopedics  H&P    Patient Name: Kirk Villar  MRN: [id number]  Admission Date: 11/26/2024  Primary Care Provider: Anabell Mendez NP    Patient information was obtained from patient and ER records.     Subjective:     Principal Problem:Closed fracture of left shoulder    Chief Complaint: No chief complaint on file.       HPI: Chief complaint:  left shoulder   History:   Kirk Villar is a 59 y.o. female seen  for evaluation of right shoulder/arm injury.  She reportedly fell at home in her kitchen on 11/03/2024.  She  injured her right shoulder.  She was seen in the emergency room where x-rays revealed fracture of the right proximal humerus with minimal displacement.  She was placed in an arm sling and referred for orthopedic consultation.  She is right-hand dominant.  She has a history of diabetes, coronary artery disease and laid and factor 5 deficiency for which she takes Plavix daily.  She was seen in the emergency room by Lisa BURNETT.  Follow up x-rays of the shoulder  showed loss of position in alignment of the proximal humerus surgical neck fracture with displacement of intra-articular femoral head fragments.   Impression : Closed comminuted displaced right proximal humerus fracture  Plan:  She  was advised that a reverse total shoulder replacement arthroplasty he was recommended.  The procedure was shown in detail using models.  Potential benefits and risks of surgery outlined to include but not limited to bleeding, infection, damage to blood vessels and nerves, need for further surgery, other risks and complications including even death the patient wished to proceed.  We discussed general/regional block anesthesia with overnight stay in the hospital.  Impression:  Closed comminuted displaced right proximal humerus fracture   Plan:  Reverse right total shoulder replacement arthroplasty.  Procedure was shown in detail using models and actual  components.  The potential benefits and risks of surgery outlined to include but not limited to bleeding, infection, damage to blood vessels and nerves, need for further surgery, other risks and complications including even death the patient wished to proceed.  Discussed general/regional block anesthesia and overnight stay in the hospital.          No new subjective & objective note has been filed under this hospital service since the last note was generated.    Assessment/Plan:     No notes have been filed under this hospital service.  Service: Orthopedic Surgery      Miguel Meyer MD  Orthopedics  [facility] - Orthopedic [facility]

## 2024-11-26 NOTE — OP NOTE
[facility] - Orthopedic Periop Services  Surgery Department  Operative Note    SUMMARY     Date of Procedure: 2024     Procedure: Procedure(s) (LRB):  ARTHROPLASTY, SHOULDER, TOTAL, REVERSE (Right)     Surgeons and Role:     * Miguel Meyer MD - Primary    Assisting Surgeon: None    Pre-Operative Diagnosis: Humeral head fracture, right, closed, initial encounter [S42.291A]    Post-Operative Diagnosis: Post-Op Diagnosis Codes:     * Humeral head fracture, right, closed, initial encounter [S42.291A]    Anesthesia:  General/regional    Technical Procedures Used:  Right reverse total shoulder replacement arthroplasty    Department of Orthopedic Surgery    Operative Note  NAME: Kirk Villar    : 1965    SURGERY DATE: 2024     PREOPERATIVE DIAGNOSIS: Humeral head fracture, right, closed, initial encounter [S42.291A]    POSTOPERATIVE DIAGNOSIS:  Humeral head fracture, right, closed, initial encounter [S42.291A]    PROCEDURE:   Procedure(s) (LRB):  ARTHROPLASTY, SHOULDER, TOTAL, REVERSE (Right)     SURGEON: Miguel Meyer MD    ASSISTANT:  None    ANESTHESIA: General    BLOOD LOSS: [unfilled]     IMPLANT:  Implant Name Type Inv. Item Serial No.  Lot No. LRB No. Used Action   PIN 3.2MM 4 VERONIKA SQUARE - [id number]  PIN 3.2MM 4 VERONIKA SQUARE  [facility] ([facility])  Right 1 Implanted and Explanted   WIRE FIXATION REUN NIT PILT - [id number]  WIRE FIXATION REUN NIT PILT  [employer]. [id number] Right 1 Implanted and Explanted   BASEPLATE REUNION JAROD [id number] - [id number]  BASEPLATE REUNION JAROD [id number]  [employer] INGRID. [id number] Right 1 Implanted   SCREW REUNION 6.5X24MM - [id number]  SCREW REUNION 6.5X24MM  [employer] INGRID. [id number] Right 1 Implanted   PLUG BONE - [id number]  PLUG BONE  [employer] INGR[employer]. [id number] Right 1 Implanted   SCREW BONE GLENOID 4.5X24MM - [id number]  SCREW BONE GLENOID 4.5X24MM  FAISAL [employer] INGRID.  [id number] Right 1 Implanted   COMPONENT GLENOSPHERE 32X2MM - [id number]  COMPONENT GLENOSPHERE 32X2MM  FAISAL [employer] INGRID. [id number] Right 1 Implanted   SCREW BONE TSA 4.5X32MM - [id number]  SCREW BONE TSA 4.5X32MM  FAIS[employer] INGRID. [id number] Right 1 Implanted   SCREW BONE TSA 4.5X20MM - [id number]  SCREW BONE TSA 4.5X20MM  [employer] INGRID. [id number] Right 1 Implanted   SCREW BONE GLENOID 4.5X24MM - [id number]  SCREW BONE GLENOID 4.5X24MM  [employer] INGRID. [id number] Right 1 Implanted   CEMENT BONE SPEED SET - [id number]  CEMENT BONE SPEED SET  FAISAL [employer] INGRID. [id number] Right 1 Implanted   STEM HUMERAL RFX 9MM - [id number]  STEM HUMERAL RFX 9MM  FAISAL [employer] INGRID. [id number] Right 1 Implanted   CEMENT BONE RESTRICTOR SZ 2 - [id number]  CEMENT BONE RESTRICTOR SZ 2  [employer] [id number] Right 1 Implanted       PROCEDURE IN DETAIL:  The patient was taken to the operating room and placed in the supine position.  After an adequate level of general/regional block anesthesia was achieved (see anesthesia note) the patient's right shoulder and upper extremity were scrubbed Betadine draped fashion.  The operation begun by making an oblique skin incision over the anterior aspect of the  right shoulder.  The incision was carried carefully through subcutaneous layers.  Skin flaps were developed.  Dissection was carried along the medial border of the deltoid muscle.  The cephalic vein was identified and retracted laterally.  The deltopectoral groove was developed.  Inspection of the anterior aspect of the proximal humerus was carried out.  The long head biceps tendon was identified, sutured was placed into the traversing pectoralis tendon.  The long head biceps tendon was transected proximal to this level.  Incision made along the medial border of the bicipital groove.  The glenohumeral joint capsule and subscapularis tendon were  dissected free from the attachment on the proximal humerus.  Humeral head was delivered into the wound.  The Steven reunion RSA humeral neck cutting guide was pinned in place about proximal humerus.  The humeral head was resected with the oscillating saw.  The guide was removed.   The Intramedullary canal of the proximal humerus was accessed with the canal fineder Reamer.  The intramedullary canal was reamed sequentially to a final diameter of 11 mm.  Proximal humerus was then broached sequentially to a final size 11 RSA broach.  The proximal cut was leveled with a rasp.  Protector plate was added to the cut surface.  Proximal humerus was position posterior to the glenoid held with retractors.  The glenoid labrum was resected.  The remnant of the long head biceps tendon was also resected.  Positioning guide was placed along glenoid articular surface and guide pin was driven through the central portion.  The intraosseous depth was measured.  Guide was removed.  The glenoid was prepared with a Reamer engaged with the guide pin.  Guide pin was removed.  Good preparation to slightly bleeding subchondral bone was achieved.  A 28 mm diameter RSA base plate with a 6.5 mm diameters center screw was then inserted and tightened.  4.5 mm diameter peripheral locking screws were inserted through drill holes in the base plate.  Good position stability of the base plate was confirmed.  A 32 mm outside diameter glenosphere with a 2 mm offset was then engaged with the Darling taper of the base plate.  Again good stability was confirmed.  Retractors removed the proximal humerus was again delivered into the wound.  The protector plate was removed.  Trial humeral cup was attached.  The components were reduced and brought to good stable range of motion.  The trial components were removed.  Wound an intramedullary canal the proximal humerus was irrigated copiously with antibiotic solutions.  Elizabeth porous-coated humeral stem measuring 9  mm in diameter by 118 mm in length was implanted.  32 mm inside 10 diameter thick humeral cup and polyethylene insert were connected and implanted on the proximal humerus.  The humeral component was then implanted within the intramedullary canal of the proximal humerus.  The joint replacement was reduced and again good stable range of motion confirmed.  Subscapularis tendon/capsule reapproximated with FiberWire suture.  Hemostasis was achieved with the Bovie electric cautery.  Wound was irrigated with antibiotic solution.  Subcutaneous tissue was reapproximated with interrupted 2-0 Vicryl suture.  Skin margin approximated with stainless steel staples.  Wounds were dressed sterilely and the patient was wakened and taken to the recovery room in satisfactory condition.  Estimated blood loss 50 cc.  The patient received Ancef antibiotic intravenously prior to procedure        Description of the Findings of the Procedure:  Closed displaced right proximal humerus fracture     Significant Surgical Tasks Conducted by the Assistant(s), if Applicable:     Complications: No    Estimated Blood Loss (EBL): 50 mL           Implants:   Implant Name Type Inv. Item Serial No.  Lot No. LRB No. Used Action   PIN 3.2MM 4 VERONIKA SQUARE - [id number]  PIN 3.2MM 4 VERONIKA [facility] [place] ([facility])  Right 1 Implanted and Explanted   WIRE FIXATION REUN NIT PILT - [id number]  WIRE FIXATION REUN NIT PILT  FAISAL [employer] INGRID. [id number] Right 1 Implanted and Explanted   BASEPLATE REUNION JAROD [id number] - [id number]  BASEPLATE REUNION JAROD [id number]  FAIS[employer] INGRID. [id number] Right 1 Implanted   SCREW REUNION 6.5X24MM - [id number]  SCREW REUNION 6.5X24MM  FA[employer] INGRID. [id number] Right 1 Implanted   PLUG BONE - [id number]  PLUG BONE  FA[employer] INGRID. [id number] Right 1 Implanted   SCREW BONE GLENOID 4.5X24MM - [id number]  SCREW BONE GLENOID 4.5X24MM  FAISAL [employer] INGRID. [id number] Right 1  Implanted   COMPONENT GLENOSPHERE 32X2MM - [id number]  COMPONENT GLENOSPHERE 32X2MM  FAISAL [employer] INGRID. [id number] Right 1 Implanted   SCREW BONE TSA 4.5X32MM - [id number]  SCREW BONE TSA 4.5X32MM  FAISAL [employer] INGRID. [id number] Right 1 Implanted   SCREW BONE TSA 4.5X20MM - [id number]  SCREW BONE TSA 4.5X20MM  FA[employer] INGRID. [id number] Right 1 Implanted   SCREW BONE GLENOID 4.5X24MM - [id number]  SCREW BONE GLENOID 4.5X24MM  [employer] INGRID. [id number] Right 1 Implanted   CEMENT BONE SPEED SET - [id number]  CEMENT BONE SPEED SET  FAISAL [employer] INGRID. [id number] Right 1 Implanted   STEM HUMERAL RFX 9MM - [id number]  STEM HUMERAL RFX 9MM  FAISAL [employer] INGRID. [id number] Right 1 Implanted   CEMENT BONE RESTRICTOR SZ 2 - [id number]  CEMENT BONE RESTRICTOR SZ 2  [employer] [id number] Right 1 Implanted       Specimens:   Specimen (24h ago, onward)      None                    Condition: Good    Disposition: PACU - hemodynamically stable.    Attestation: I was present and scrubbed for the entire procedure.

## 2024-11-26 NOTE — ANESTHESIA PROCEDURE NOTES
Intubation    Date/Time: 11/26/2024 9:17 AM    Performed by: Roni Leung CRNA  Authorized by: Slick Chiu MD    Intubation:     Induction:  Intravenous    Intubated:  Postinduction    Mask Ventilation:  Easy mask    Attempts:  1    Attempted By:  CRNA    Method of Intubation:  Direct    Blade:  Keny 3    Laryngeal View Grade: Grade I - full view of cords      Difficult Airway Encountered?: No      Complications:  None    Airway Device:  Oral endotracheal tube    Airway Device Size:  7.0    Style/Cuff Inflation:  Cuffed (inflated to minimal occlusive pressure)    Tube secured:  22    Secured at:  The lips    Placement Verified By:  Capnometry and Revisualization with laryngoscopy    Complicating Factors:  None    Findings Post-Intubation:  BS equal bilateral and atraumatic/condition of teeth unchanged

## 2024-11-26 NOTE — BRIEF OP NOTE
[facility] - Orthopedic Periop Services  Brief Operative Note    Surgery Date: 11/26/2024     Surgeons and Role:     * Miguel Meyer MD - Primary    Assisting Surgeon: None    Pre-op Diagnosis:  Humeral head fracture, right, closed, initial encounter [S42.291A]    Post-op Diagnosis:  Post-Op Diagnosis Codes:     * Humeral head fracture, right, closed, initial encounter [S42.291A]    Procedure(s) (LRB):  ARTHROPLASTY, SHOULDER, TOTAL, REVERSE (Right)    Anesthesia:  General/regional block    Description of the findings of the procedure(s): See Op Note     Estimated Blood Loss: 50 mL         Specimens:   Specimen (24h ago, onward)      None              Discharge Note    OUTCOME: Patient tolerated treatment/procedure well without complication and is now ready for discharge.    DISPOSITION: Home or Self Care    FINAL DIAGNOSIS:  Closed fracture of left shoulder    FOLLOWUP: In clinic    DISCHARGE INSTRUCTIONS:  No discharge procedures on file.

## 2024-11-26 NOTE — HPI
Chief complaint:  left shoulder   History:   Kirk Villar is a 59 y.o. female seen  for evaluation of right shoulder/arm injury.  She reportedly fell at home in her kitchen on 11/03/2024.  She  injured her right shoulder.  She was seen in the emergency room where x-rays revealed fracture of the right proximal humerus with minimal displacement.  She was placed in an arm sling and referred for orthopedic consultation.  She is right-hand dominant.  She has a history of diabetes, coronary artery disease and laid and factor 5 deficiency for which she takes Plavix daily.  She was seen in the emergency room by Lisa BURNETT.  Follow up x-rays of the shoulder  showed loss of position in alignment of the proximal humerus surgical neck fracture with displacement of intra-articular femoral head fragments.   Impression : Closed comminuted displaced right proximal humerus fracture  Plan:  She  was advised that a reverse total shoulder replacement arthroplasty he was recommended.  The procedure was shown in detail using models.  Potential benefits and risks of surgery outlined to include but not limited to bleeding, infection, damage to blood vessels and nerves, need for further surgery, other risks and complications including even death the patient wished to proceed.  We discussed general/regional block anesthesia with overnight stay in the hospital.  Impression:  Closed comminuted displaced right proximal humerus fracture   Plan:  Reverse right total shoulder replacement arthroplasty.  Procedure was shown in detail using models and actual components.  The potential benefits and risks of surgery outlined to include but not limited to bleeding, infection, damage to blood vessels and nerves, need for further surgery, other risks and complications including even death the patient wished to proceed.  Discussed general/regional block anesthesia and overnight stay in the hospital.

## 2024-11-26 NOTE — H&P
[facility] - Orthopedic Periop Services  Orthopedics  H&P    Patient Name: Kirk Villar  MRN: [id number]  Admission Date: (Not on file)  Primary Care Provider: Anabell Mendez NP    Patient information was obtained from patient and ER records.     Subjective:     Principal Problem:Closed fracture of left shoulder    Chief Complaint: No chief complaint on file.       HPI: Chief complaint:  left shoulder   History:   Kirk Villar is a 59 y.o. female seen  for evaluation of right shoulder/arm injury.  She reportedly fell at home in her kitchen on 11/03/2024.  She  injured her right shoulder.  She was seen in the emergency room where x-rays revealed fracture of the right proximal humerus with minimal displacement.  She was placed in an arm sling and referred for orthopedic consultation.  She is right-hand dominant.  She has a history of diabetes, coronary artery disease and laid and factor 5 deficiency for which she takes Plavix daily.  She was seen in the emergency room by Lisa BURNETT.  Follow up x-rays of the shoulder  showed loss of position in alignment of the proximal humerus surgical neck fracture with displacement of intra-articular femoral head fragments.   Impression : Closed comminuted displaced left proximal humerus fracture  Plan:  She  was advised that a reverse total shoulder replacement arthroplasty he was recommended.  The procedure was shown in detail using models.  Potential benefits and risks of surgery outlined to include but not limited to bleeding, infection, damage to blood vessels and nerves, need for further surgery, other risks and complications including even death the patient wished to proceed.  We discussed general/regional block anesthesia with overnight stay in the hospital.          Past Medical History:   Diagnosis Date    ACC/AHA stage C congestive heart failure due to ischemic cardiomyopathy 03/12/2024    ACE inhibitor intolerance 03/12/2024    Alcohol dependence with  uncomplicated withdrawal 07/09/2024    Broken shoulder 11/02/2024    right shoulder    CAD (coronary artery disease) 07/09/2024    Chronic deep vein thrombosis (DVT) of proximal vein of lower extremity, unspecified laterality 09/03/2024    CKD stage 3b, GFR 30-44 ml/min 07/11/2024    Diabetes mellitus, type 2     Episode of recurrent major depressive disorder, unspecified depression episode severity 09/03/2024    Factor 5 Leiden mutation, heterozygous     Familial hyperlipidemia 03/12/2024    Plan to (re) initiate crestor  Did not tolerate lipitor         History reviewed. No pertinent surgical history.    Review of patient's allergies indicates:  No Known Allergies    No current facility-administered medications for this encounter.     Current Outpatient Medications   Medication Sig    albuterol (VENTOLIN HFA) 90 mcg/actuation inhaler Inhale 2 puffs into the lungs every 6 (six) hours as needed for Wheezing. Rescue    albuterol-ipratropium (DUO-NEB) 2.5 mg-0.5 mg/3 mL nebulizer solution Take 3 mLs by nebulization every 6 (six) hours as needed for Wheezing. Rescue    blood sugar diagnostic (ACCU-CHEK GUIDE TEST STRIPS) Strp 1 strip by Misc.(Non-Drug; Combo Route) route 3 (three) times daily.    citalopram (CELEXA) 40 MG tablet TAKE 1 TABLET ONE TIME DAILY    clopidogreL (PLAVIX) 75 mg tablet TAKE 1 TABLET ONE TIME DAILY    cyclobenzaprine (FLEXERIL) 10 MG tablet TAKE 1 TABLET TWICE DAILY AS NEEDED FOR MUSCLE SPASM(S)    dapagliflozin propanediol (FARXIGA) 5 mg Tab tablet Take 1 tablet (5 mg total) by mouth once daily.    docusate sodium (COLACE) 100 MG capsule Take 2 capsules (200 mg total) by mouth once daily.    ezetimibe (ZETIA) 10 mg tablet Take 1 tablet (10 mg total) by mouth once daily.    furosemide (LASIX) 40 MG tablet Take 1 tablet (40 mg total) by mouth once daily.    gabapentin (NEURONTIN) 800 MG tablet TAKE 1 TABLET FOUR TIMES DAILY    HYDROcodone-acetaminophen (NORCO) 5-325 mg per tablet Take 1 tablet by  mouth every 6 (six) hours as needed for Pain. (Patient not taking: Reported on 11/22/2024)    HYDROcodone-acetaminophen (NORCO) 7.5-325 mg per tablet Take 1 tablet by mouth every 6 (six) hours as needed for Pain.    insulin aspart U-100 (NOVOLOG FLEXPEN U-100 INSULIN) 100 unit/mL (3 mL) InPn pen Inject 5-10 minutes before meals per sliding scale    insulin degludec (TRESIBA FLEXTOUCH U-100) 100 unit/mL (3 mL) insulin pen Inject 20 Units into the skin every evening.    metoprolol succinate (TOPROL-XL) 25 MG 24 hr tablet Take 1 tablet (25 mg total) by mouth 2 (two) times daily.    mupirocin (BACTROBAN) 2 % ointment Apply topically 2 (two) times daily.    rivaroxaban (XARELTO) 20 mg Tab Take 1 tablet (20 mg total) by mouth daily with dinner or evening meal.    rosuvastatin (CRESTOR) 20 MG tablet Take 1 tablet (20 mg total) by mouth once daily.    sacubitriL-valsartan (ENTRESTO) 24-26 mg per tablet Take 1 tablet by mouth 2 (two) times daily.    traZODone (DESYREL) 100 MG tablet TAKE 1 TABLET EVERY EVENING     Family History       Problem Relation (Age of Onset)    Cancer Mother    Heart failure Father          Tobacco Use    Smoking status: Every Day     Current packs/day: 0.25     Average packs/day: 0.5 packs/day for 49.9 years (24.7 ttl pk-yrs)     Types: Cigarettes     Start date: 1975    Smokeless tobacco: Never    Tobacco comments:     Patient states 4 singles a day   Substance and Sexual Activity    Alcohol use: Yes     Comment: occasionally    Drug use: Never    Sexual activity: Not Currently     ROS  Objective:     Vital Signs (Most Recent):    Vital Signs (24h Range):  BP: ()/()   Arterial Line BP: ()/()            There is no height or weight on file to calculate BMI.    No intake or output data in the 24 hours ending 11/25/24 2040     General    Vitals reviewed.  Constitutional: She is oriented to person, place, and time. She appears well-developed and well-nourished.   HENT:   Head: Normocephalic and  atraumatic.   Eyes: EOM are normal. Pupils are equal, round, and reactive to light.   Neck: Neck supple.   Cardiovascular:  Normal rate, regular rhythm and normal heart sounds.            Pulmonary/Chest: Effort normal and breath sounds normal.   Abdominal: Soft. Bowel sounds are normal.   Neurological: She is alert and oriented to person, place, and time.   Psychiatric: She has a normal mood and affect. Her behavior is normal.         Right Shoulder Exam   Right shoulder exam is normal.    Left Shoulder Exam     Tenderness   The patient is tender to palpation of the acromioclavicular joint, acromion and greater tuberosity.    Range of Motion   Active abduction:  abnormal   Passive abduction:  abnormal   Extension:  abnormal   Forward Flexion:  abnormal   Forward Elevation: abnormal  Adduction: abnormal    Muscle Strength   The patient has normal left shoulder strength.    Tests & Signs   Rotator Cuff Painful Arc/Range: moderate    Other   Sensation: normal       Vascular Exam       Left Pulses      Radial:                    2+      Capillary Refill  Left Hand: normal capillary refill           Significant Labs: All pertinent labs within the past 24 hours have been reviewed.    Significant Imaging: U/S: I have reviewed all pertinent results/findings and my personal findings are:  Displaced left proximal humerus fracture  Assessment/Plan:     No notes have been filed under this hospital service.  Service: Orthopedic Surgery      Miguel Meyer MD  Orthopedics  [facility] - Orthopedic Periop Services

## 2024-11-26 NOTE — ANESTHESIA PROCEDURE NOTES
Peripheral Block    Patient location during procedure: OR   Block not for primary anesthetic.  Reason for block: at surgeon's request and post-op pain management   Post-op Pain Location: Right shoulder   Start time: 11/26/2024 9:12 AM  Timeout: 11/26/2024 9:11 AM   End time: 11/26/2024 9:16 AM    Staffing  Authorizing Provider: Slick Chiu MD  Performing Provider: Slick Chiu MD    Staffing  Performed by: Slick Chiu MD  Authorized by: Slick Chiu MD    Preanesthetic Checklist  Completed: patient identified, risks and benefits discussed, pre-op evaluation and timeout performed  Peripheral Block  Patient position: supine  Prep: ChloraPrep  Block type: interscalene  Laterality: right  Injection technique: single shot  Needle  Needle localization: nerve stimulator and ultrasound guidance     Medications:    Medications: ROPIvacaine (NAROPIN) injection 0.75% - Perineural   40 mL - 11/26/2024 9:16:00 AM    Additional Notes  No complications.

## 2024-11-26 NOTE — TRANSFER OF CARE
"Anesthesia Transfer of Care Note    Patient: Kirk Villar    Procedure(s) Performed: Procedure(s) (LRB):  ARTHROPLASTY, SHOULDER, TOTAL, REVERSE (Right)    Patient location: PACU    Anesthesia Type: general and regional    Transport from OR: Transported from OR on 6-10 L/min O2 by face mask with adequate spontaneous ventilation    Post pain: adequate analgesia    Post assessment: no apparent anesthetic complications    Post vital signs: stable    Level of consciousness: responds to stimulation, awake and sedated    Nausea/Vomiting: no nausea/vomiting    Complications: none    Transfer of care protocol was followed    Last vitals: Visit Vitals  /62 (BP Location: Right arm, Patient Position: Lying)   Pulse 84   Temp 36.8 °C (98.3 °F) (Oral)   Resp 16   Ht 5' 6" (1.676 m)   Wt 95.3 kg (210 lb)   SpO2 95%   Breastfeeding No   BMI 33.89 kg/m²     "

## 2024-11-26 NOTE — OR NURSING
1226- Pt arrived in pacu. Pt drowsy but awake. Dressing on right shoulder clean, dry and intact. Pt removing O2 mask. Patient educated on importance of keeping mask on. Vss, respirations even et unlabored. Pt agitated.    1243- Pt wheezing. Duoneb  respiratory placement placed on patient. Patient encouraged to not remove oxygen mask and importance of respiratory treatment. Vss, respirations even et unlabored.    1304- Pt complaining of pain. Pt agitated and cursing. Vss, respirations even et unlabored. Dose of pain medication administered. Dressing on right shoulder c/d/I.    1309- Pt continues to complain of pain. Vss, respirations even et unlabored. Dressing C/d/I.  Additional dose of pain medication administered. See flow sheet.     1327- Pt continues to complain of pain. Vss, respirations even et unlabored. Additional dose of pain medication administered. See flow sheet.      1345- Pt aaox4. Agitated. Vss, respirations even et unlabored. Purewick placed on patient. Paatient occasionally removing o2 cannula. Patient reminded of importance of keeping oxygen on. Patient irritable and occasionally cursing. No signs of pain or distress. Dressing on right shoulder c/d/I.    1400- Pt room ready - Pt ready for discharge. Pt aaox4. Vss, respirations even et unlabored.Pt transported to room 560.    1420- Report given to Sai TOLLIVER. VS: /70, hr 78, rr 18, O2 95 on 3 L NC

## 2024-11-26 NOTE — INTERVAL H&P NOTE
The patient has been examined and the H&P has been reviewed:    I concur with the findings and no changes have occurred since H&P was written.    Surgery risks, benefits and alternative options discussed and understood by patient/family.          Active Hospital Problems    Diagnosis  POA    *Closed fracture of left shoulder [S42.92XA]  Yes      Resolved Hospital Problems   No resolved problems to display.

## 2024-11-27 VITALS
HEIGHT: 66 IN | HEART RATE: 99 BPM | BODY MASS INDEX: 33.75 KG/M2 | RESPIRATION RATE: 20 BRPM | DIASTOLIC BLOOD PRESSURE: 78 MMHG | SYSTOLIC BLOOD PRESSURE: 143 MMHG | OXYGEN SATURATION: 91 % | WEIGHT: 210 LBS | TEMPERATURE: 98 F

## 2024-11-27 LAB
ALBUMIN SERPL BCP-MCNC: 2.6 G/DL (ref 3.4–4.8)
ALBUMIN/GLOB SERPL: 0.9 {RATIO}
ALP SERPL-CCNC: 74 U/L (ref 40–150)
ALT SERPL W P-5'-P-CCNC: <7 U/L
ANION GAP SERPL CALCULATED.3IONS-SCNC: 15 MMOL/L (ref 7–16)
AST SERPL W P-5'-P-CCNC: 12 U/L (ref 5–34)
BASOPHILS # BLD AUTO: 0.01 K/UL (ref 0–0.2)
BASOPHILS NFR BLD AUTO: 0.1 % (ref 0–1)
BILIRUB SERPL-MCNC: 0.4 MG/DL
BUN SERPL-MCNC: 16 MG/DL (ref 10–20)
BUN/CREAT SERPL: 13 (ref 6–20)
CALCIUM SERPL-MCNC: 8.4 MG/DL (ref 8.4–10.2)
CHLORIDE SERPL-SCNC: 96 MMOL/L (ref 98–107)
CO2 SERPL-SCNC: 33 MMOL/L (ref 23–31)
CREAT SERPL-MCNC: 1.27 MG/DL (ref 0.55–1.02)
DIFFERENTIAL METHOD BLD: ABNORMAL
EGFR (NO RACE VARIABLE) (RUSH/TITUS): 48 ML/MIN/1.73M2
EOSINOPHIL # BLD AUTO: 0 K/UL (ref 0–0.5)
EOSINOPHIL NFR BLD AUTO: 0 % (ref 1–4)
ERYTHROCYTE [DISTWIDTH] IN BLOOD BY AUTOMATED COUNT: 13.9 % (ref 11.5–14.5)
EST. AVERAGE GLUCOSE BLD GHB EST-MCNC: 197 MG/DL
GLOBULIN SER-MCNC: 2.9 G/DL (ref 2–4)
GLUCOSE SERPL-MCNC: 323 MG/DL (ref 70–105)
GLUCOSE SERPL-MCNC: 324 MG/DL (ref 82–115)
GLUCOSE SERPL-MCNC: 340 MG/DL (ref 70–105)
HBA1C MFR BLD HPLC: 8.5 %
HCT VFR BLD AUTO: 29.7 % (ref 38–47)
HGB BLD-MCNC: 8.9 G/DL (ref 12–16)
IMM GRANULOCYTES # BLD AUTO: 0.03 K/UL (ref 0–0.04)
IMM GRANULOCYTES NFR BLD: 0.3 % (ref 0–0.4)
LYMPHOCYTES # BLD AUTO: 0.45 K/UL (ref 1–4.8)
LYMPHOCYTES NFR BLD AUTO: 5.1 % (ref 27–41)
MCH RBC QN AUTO: 28.9 PG (ref 27–31)
MCHC RBC AUTO-ENTMCNC: 30 G/DL (ref 32–36)
MCV RBC AUTO: 96.4 FL (ref 80–96)
MONOCYTES # BLD AUTO: 0.51 K/UL (ref 0–0.8)
MONOCYTES NFR BLD AUTO: 5.8 % (ref 2–6)
MPC BLD CALC-MCNC: 10.5 FL (ref 9.4–12.4)
NEUTROPHILS # BLD AUTO: 7.82 K/UL (ref 1.8–7.7)
NEUTROPHILS NFR BLD AUTO: 88.7 % (ref 53–65)
NRBC # BLD AUTO: 0 X10E3/UL
NRBC, AUTO (.00): 0 %
PLATELET # BLD AUTO: 275 K/UL (ref 150–400)
POTASSIUM SERPL-SCNC: 4.3 MMOL/L (ref 3.5–5.1)
PROT SERPL-MCNC: 5.5 G/DL (ref 5.8–7.6)
RBC # BLD AUTO: 3.08 M/UL (ref 4.2–5.4)
SODIUM SERPL-SCNC: 140 MMOL/L (ref 136–145)
WBC # BLD AUTO: 8.82 K/UL (ref 4.5–11)

## 2024-11-27 PROCEDURE — 63600175 PHARM REV CODE 636 W HCPCS: Performed by: ORTHOPAEDIC SURGERY

## 2024-11-27 PROCEDURE — 27000221 HC OXYGEN, UP TO 24 HOURS

## 2024-11-27 PROCEDURE — 25000242 PHARM REV CODE 250 ALT 637 W/ HCPCS: Performed by: HOSPITALIST

## 2024-11-27 PROCEDURE — 25000003 PHARM REV CODE 250: Performed by: ORTHOPAEDIC SURGERY

## 2024-11-27 PROCEDURE — 83036 HEMOGLOBIN GLYCOSYLATED A1C: CPT | Performed by: HOSPITALIST

## 2024-11-27 PROCEDURE — 97161 PT EVAL LOW COMPLEX 20 MIN: CPT

## 2024-11-27 PROCEDURE — 80053 COMPREHEN METABOLIC PANEL: CPT | Performed by: ORTHOPAEDIC SURGERY

## 2024-11-27 PROCEDURE — 25000003 PHARM REV CODE 250: Performed by: HOSPITALIST

## 2024-11-27 PROCEDURE — 36415 COLL VENOUS BLD VENIPUNCTURE: CPT | Performed by: ORTHOPAEDIC SURGERY

## 2024-11-27 PROCEDURE — 85025 COMPLETE CBC W/AUTO DIFF WBC: CPT | Performed by: ORTHOPAEDIC SURGERY

## 2024-11-27 PROCEDURE — 82962 GLUCOSE BLOOD TEST: CPT

## 2024-11-27 PROCEDURE — 94761 N-INVAS EAR/PLS OXIMETRY MLT: CPT

## 2024-11-27 PROCEDURE — 99900035 HC TECH TIME PER 15 MIN (STAT)

## 2024-11-27 PROCEDURE — 94640 AIRWAY INHALATION TREATMENT: CPT | Mod: XB

## 2024-11-27 PROCEDURE — 99215 OFFICE O/P EST HI 40 MIN: CPT | Mod: ,,, | Performed by: STUDENT IN AN ORGANIZED HEALTH CARE EDUCATION/TRAINING PROGRAM

## 2024-11-27 RX ORDER — INSULIN GLARGINE 100 [IU]/ML
5 INJECTION, SOLUTION SUBCUTANEOUS DAILY
Status: DISCONTINUED | OUTPATIENT
Start: 2024-11-27 | End: 2024-11-27 | Stop reason: HOSPADM

## 2024-11-27 RX ORDER — MORPHINE SULFATE 4 MG/ML
4 INJECTION, SOLUTION INTRAMUSCULAR; INTRAVENOUS EVERY 4 HOURS PRN
Status: DISCONTINUED | OUTPATIENT
Start: 2024-11-27 | End: 2024-11-27 | Stop reason: HOSPADM

## 2024-11-27 RX ORDER — PREDNISONE 20 MG/1
40 TABLET ORAL DAILY
Status: DISCONTINUED | OUTPATIENT
Start: 2024-11-27 | End: 2024-11-27 | Stop reason: HOSPADM

## 2024-11-27 RX ORDER — HYDROMORPHONE HYDROCHLORIDE 2 MG/ML
1 INJECTION, SOLUTION INTRAMUSCULAR; INTRAVENOUS; SUBCUTANEOUS ONCE AS NEEDED
Status: DISCONTINUED | OUTPATIENT
Start: 2024-11-27 | End: 2024-11-27 | Stop reason: HOSPADM

## 2024-11-27 RX ORDER — HYDROCODONE BITARTRATE AND ACETAMINOPHEN 10; 325 MG/1; MG/1
1 TABLET ORAL EVERY 6 HOURS PRN
Qty: 28 TABLET | Refills: 0 | Status: SHIPPED | OUTPATIENT
Start: 2024-11-27 | End: 2024-12-04

## 2024-11-27 RX ADMIN — ATORVASTATIN CALCIUM 40 MG: 40 TABLET, FILM COATED ORAL at 09:11

## 2024-11-27 RX ADMIN — IPRATROPIUM BROMIDE AND ALBUTEROL SULFATE 3 ML: .5; 3 SOLUTION RESPIRATORY (INHALATION) at 07:11

## 2024-11-27 RX ADMIN — HYDROCODONE BITARTRATE AND ACETAMINOPHEN 1 TABLET: 10; 325 TABLET ORAL at 05:11

## 2024-11-27 RX ADMIN — HYDROCODONE BITARTRATE AND ACETAMINOPHEN 1 TABLET: 10; 325 TABLET ORAL at 01:11

## 2024-11-27 RX ADMIN — METOPROLOL SUCCINATE 25 MG: 25 TABLET, EXTENDED RELEASE ORAL at 09:11

## 2024-11-27 RX ADMIN — HYDROCODONE BITARTRATE AND ACETAMINOPHEN 1 TABLET: 10; 325 TABLET ORAL at 09:11

## 2024-11-27 RX ADMIN — IPRATROPIUM BROMIDE AND ALBUTEROL SULFATE 3 ML: .5; 3 SOLUTION RESPIRATORY (INHALATION) at 03:11

## 2024-11-27 RX ADMIN — LEVOFLOXACIN 500 MG: 250 TABLET, FILM COATED ORAL at 09:11

## 2024-11-27 RX ADMIN — MORPHINE SULFATE 4 MG: 4 INJECTION INTRAVENOUS at 11:11

## 2024-11-27 NOTE — CONSULTS
[facility] Hospital Medicine  Consult Note    Patient Name: Kirk Villar  MRN: [id number]  Admission Date: 11/26/2024  Hospital Length of Stay: 0 days  Attending Physician: Miguel Meyer MD   Primary Care Provider: Anabell Mendez NP           Patient information was obtained from patient, past medical records, and ER records.     Consults  Subjective:     Principal Problem: Closed fracture of left shoulder    Chief Complaint: No chief complaint on file.       HPI: Ms. Villar is a 58yo woman history of CHF (EF 40%), DVT on Xarelto, DM2 (10), CKD, Factor 5 leiden on Xarelto, HLD, alcohol dependence, CAD (3 stents 3 years ago), PVD (2 stents in each leg) who presents with left shoulder fracture. She is complaining of severe shortness of breath and requesting breathing treatments.     Past Medical History:   Diagnosis Date    ACC/AHA stage C congestive heart failure due to ischemic cardiomyopathy 03/12/2024    ACE inhibitor intolerance 03/12/2024    Alcohol dependence with uncomplicated withdrawal 07/09/2024    Broken shoulder 11/02/2024    right shoulder    CAD (coronary artery disease) 07/09/2024    Chronic deep vein thrombosis (DVT) of proximal vein of lower extremity, unspecified laterality 09/03/2024    CKD stage 3b, GFR 30-44 ml/min 07/11/2024    Diabetes mellitus, type 2     Episode of recurrent major depressive disorder, unspecified depression episode severity 09/03/2024    Factor 5 Leiden mutation, heterozygous     Familial hyperlipidemia 03/12/2024    Plan to (re) initiate crestor  Did not tolerate lipitor         History reviewed. No pertinent surgical history.    Review of patient's allergies indicates:  No Known Allergies    No current facility-administered medications on file prior to encounter.     Current Outpatient Medications on File Prior to Encounter   Medication Sig    albuterol (VENTOLIN HFA) 90 mcg/actuation inhaler Inhale 2 puffs into the lungs  every 6 (six) hours as needed for Wheezing. Rescue    citalopram (CELEXA) 40 MG tablet TAKE 1 TABLET ONE TIME DAILY    cyclobenzaprine (FLEXERIL) 10 MG tablet TAKE 1 TABLET TWICE DAILY AS NEEDED FOR MUSCLE SPASM(S)    metoprolol succinate (TOPROL-XL) 25 MG 24 hr tablet Take 1 tablet (25 mg total) by mouth 2 (two) times daily.    rosuvastatin (CRESTOR) 20 MG tablet Take 1 tablet (20 mg total) by mouth once daily.    traZODone (DESYREL) 100 MG tablet TAKE 1 TABLET EVERY EVENING    albuterol-ipratropium (DUO-NEB) 2.5 mg-0.5 mg/3 mL nebulizer solution Take 3 mLs by nebulization every 6 (six) hours as needed for Wheezing. Rescue    blood sugar diagnostic (ACCU-CHEK GUIDE TEST STRIPS) Strp 1 strip by Misc.(Non-Drug; Combo Route) route 3 (three) times daily.    clopidogreL (PLAVIX) 75 mg tablet TAKE 1 TABLET ONE TIME DAILY    dapagliflozin propanediol (FARXIGA) 5 mg Tab tablet Take 1 tablet (5 mg total) by mouth once daily.    docusate sodium (COLACE) 100 MG capsule Take 2 capsules (200 mg total) by mouth once daily.    ezetimibe (ZETIA) 10 mg tablet Take 1 tablet (10 mg total) by mouth once daily.    furosemide (LASIX) 40 MG tablet Take 1 tablet (40 mg total) by mouth once daily.    gabapentin (NEURONTIN) 800 MG tablet TAKE 1 TABLET FOUR TIMES DAILY    insulin aspart U-100 (NOVOLOG FLEXPEN U-100 INSULIN) 100 unit/mL (3 mL) InPn pen Inject 5-10 minutes before meals per sliding scale    insulin degludec (TRESIBA FLEXTOUCH U-100) 100 unit/mL (3 mL) insulin pen Inject 20 Units into the skin every evening.    mupirocin (BACTROBAN) 2 % ointment Apply topically 2 (two) times daily.    rivaroxaban (XARELTO) 20 mg Tab Take 1 tablet (20 mg total) by mouth daily with dinner or evening meal.    sacubitriL-valsartan (ENTRESTO) 24-26 mg per tablet Take 1 tablet by mouth 2 (two) times daily.    [DISCONTINUED] HYDROcodone-acetaminophen (NORCO) 5-325 mg per tablet Take 1 tablet by mouth every 6 (six) hours as needed for Pain. (Patient not  taking: Reported on 11/22/2024)     Family History       Problem Relation (Age of Onset)    Cancer Mother    Heart failure Father          Tobacco Use    Smoking status: Every Day     Current packs/day: 0.25     Average packs/day: 0.5 packs/day for 49.9 years (24.7 ttl pk-yrs)     Types: Cigarettes     Start date: 1975    Smokeless tobacco: Never    Tobacco comments:     Patient states 4 singles a day   Substance and Sexual Activity    Alcohol use: Yes     Comment: occasionally    Drug use: Never    Sexual activity: Not Currently     Review of Systems   Constitutional:  Negative for activity change, chills, fatigue and fever.   HENT:  Negative for congestion and sore throat.    Respiratory:  Positive for cough, chest tightness, shortness of breath and wheezing.    Gastrointestinal:  Negative for abdominal distention, abdominal pain and diarrhea.   Endocrine: Negative for cold intolerance and heat intolerance.   Genitourinary:  Negative for dysuria.   Musculoskeletal:  Negative for arthralgias and back pain.   Skin:  Negative for color change and rash.   Neurological:  Negative for dizziness, tremors and headaches.   Psychiatric/Behavioral:  Negative for agitation. The patient is not nervous/anxious.      Objective:     Vital Signs (Most Recent):  Temp: 98.2 °F (36.8 °C) (11/26/24 1445)  Pulse: 76 (11/26/24 1935)  Resp: (!) 22 (11/26/24 1935)  BP: 122/78 (11/26/24 1745)  SpO2: 96 % (11/26/24 1935) Vital Signs (24h Range):  Temp:  [97.6 °F (36.4 °C)-98.3 °F (36.8 °C)] 98.2 °F (36.8 °C)  Pulse:  [74-93] 76  Resp:  [13-26] 22  SpO2:  [80 %-97 %] 96 %  BP: (110-159)/(49-78) 122/78     Weight: 95.3 kg (210 lb)  Body mass index is 33.89 kg/m².     Physical Exam  Constitutional:       Appearance: She is ill-appearing.   HENT:      Head: Normocephalic and atraumatic.      Nose: Nose normal.      Mouth/Throat:      Mouth: Mucous membranes are moist.      Pharynx: Oropharynx is clear.   Eyes:      Extraocular Movements:  Extraocular movements intact.      Conjunctiva/sclera: Conjunctivae normal.      Pupils: Pupils are equal, round, and reactive to light.   Cardiovascular:      Rate and Rhythm: Normal rate and regular rhythm.      Pulses: Normal pulses.      Heart sounds: Normal heart sounds.   Pulmonary:      Effort: Respiratory distress present.      Breath sounds: Wheezing and rales present.   Abdominal:      General: Abdomen is flat. Bowel sounds are normal.      Palpations: Abdomen is soft.   Musculoskeletal:         General: Normal range of motion.      Cervical back: Normal range of motion and neck supple.   Skin:     General: Skin is warm and dry.   Neurological:      General: No focal deficit present.      Mental Status: She is alert and oriented to person, place, and time.   Psychiatric:         Mood and Affect: Mood normal.         Behavior: Behavior normal.          Significant Labs: All pertinent labs within the past 24 hours have been reviewed.    Significant Imaging: I have reviewed all pertinent imaging results/findings within the past 24 hours.  Assessment/Plan:     * Closed fracture of left shoulder  Mgmt per ortho.   Xarelto ordered since patient has factor V leiden deficiency with frequent clots.        COPD exacerbation  Patient's COPD is with exacerbation noted by continued dyspnea, use of accessory muscles for breathing, and worsening of baseline hypoxia currently.  Patient is currently off COPD Pathway. Continue scheduled inhalers Steroids, Antibiotics, and Supplemental oxygen and monitor respiratory status closely.     Factor 5 Leiden mutation, heterozygous  High risk for recurrent clots.   Continue Xarelto.       Heart failure with mid-range ejection fraction (HFmEF)  IV lasix as tolerated.       Uncontrolled type 2 diabetes mellitus with hyperglycemia  Patient's FSGs are controlled on current medication regimen.  Last A1c reviewed-   Lab Results   Component Value Date    HGBA1C 10.0 (H) 07/09/2024     Most  "recent fingerstick glucose reviewed- No results for input(s): "POCTGLUCOSE" in the last 24 hours.  Current correctional scale  Medium  Maintain anti-hyperglycemic dose as follows-   Antihyperglycemics (From admission, onward)      Start     Stop Route Frequency Ordered    11/26/24 2025  insulin aspart U-100 injection 0-10 Units         -- SubQ Before meals & nightly PRN 11/26/24 1926          Hold Oral hypoglycemics while patient is in the hospital.    Chronic deep vein thrombosis (DVT) of proximal vein of lower extremity, unspecified laterality  Continue xarelto        Alcohol dependence with uncomplicated withdrawal  Monitor for withdrawal          VTE Risk Mitigation (From admission, onward)           Ordered     rivaroxaban tablet 20 mg  With dinner         11/26/24 1926     IP VTE HIGH RISK PATIENT  Once         11/26/24 1400     Place OBI hose  Until discontinued        Comments: OBI to BLE    11/26/24 1400     Place sequential compression device  Until discontinued        Comments: BLE    11/26/24 1400                        Thank you for your consult. I will follow-up with patient. Please contact us if you have any additional questions.    Bea Trejo MD  Department of Hospital Medicine   [facility] - [address] [place] [address] Telemetry      "

## 2024-11-27 NOTE — ASSESSMENT & PLAN NOTE
Patient's COPD is with exacerbation noted by continued dyspnea, use of accessory muscles for breathing, and worsening of baseline hypoxia currently.  Patient is currently off COPD Pathway. Continue scheduled inhalers Steroids, Antibiotics, and Supplemental oxygen and monitor respiratory status closely.     Continue Levaquin, transitioned to prednisone    She can continue these medications orally along with scheduled DuoNebs at home.

## 2024-11-27 NOTE — PLAN OF CARE
[facility] - [facility] Telemetry  Discharge Final Note    Primary Care Provider: Anabell Mendez NP    Expected Discharge Date: 11/27/2024    Final Discharge Note (most recent)       Final Note - 11/27/24 1023          Final Note    Assessment Type Final Discharge Note     Anticipated Discharge Disposition Home or Self Care        Post-Acute Status    Discharge Delays None known at this time                 Pt d/c home with no needs.    Important Message from Medicare             Contact Info       Yina Lopez PA   Specialty: Orthopedic Surgery    [address] [place] [id number]   Phone: [phone]       Next Steps: Follow up on 12/11/2024    Instructions: 1:20 pm

## 2024-11-27 NOTE — PT/OT/SLP PROGRESS
Occupational Therapy      Patient Name:  Kirk Villar   MRN:  [id number]    Patient not seen today secondary to Patient unwilling to participate. Pt refused evaluation stating she did not need therapy. Pt was dressed and ready to go home, stating she had figured out how to dress herself due to having several weeks of (R) UE being impaired..    11/27/2024

## 2024-11-27 NOTE — SUBJECTIVE & OBJECTIVE
Past Medical History:   Diagnosis Date    ACC/AHA stage C congestive heart failure due to ischemic cardiomyopathy 03/12/2024    ACE inhibitor intolerance 03/12/2024    Alcohol dependence with uncomplicated withdrawal 07/09/2024    Broken shoulder 11/02/2024    right shoulder    CAD (coronary artery disease) 07/09/2024    Chronic deep vein thrombosis (DVT) of proximal vein of lower extremity, unspecified laterality 09/03/2024    CKD stage 3b, GFR 30-44 ml/min 07/11/2024    Diabetes mellitus, type 2     Episode of recurrent major depressive disorder, unspecified depression episode severity 09/03/2024    Factor 5 Leiden mutation, heterozygous     Familial hyperlipidemia 03/12/2024    Plan to (re) initiate crestor  Did not tolerate lipitor         History reviewed. No pertinent surgical history.    Review of patient's allergies indicates:  No Known Allergies    No current facility-administered medications on file prior to encounter.     Current Outpatient Medications on File Prior to Encounter   Medication Sig    albuterol (VENTOLIN HFA) 90 mcg/actuation inhaler Inhale 2 puffs into the lungs every 6 (six) hours as needed for Wheezing. Rescue    citalopram (CELEXA) 40 MG tablet TAKE 1 TABLET ONE TIME DAILY    cyclobenzaprine (FLEXERIL) 10 MG tablet TAKE 1 TABLET TWICE DAILY AS NEEDED FOR MUSCLE SPASM(S)    metoprolol succinate (TOPROL-XL) 25 MG 24 hr tablet Take 1 tablet (25 mg total) by mouth 2 (two) times daily.    rosuvastatin (CRESTOR) 20 MG tablet Take 1 tablet (20 mg total) by mouth once daily.    traZODone (DESYREL) 100 MG tablet TAKE 1 TABLET EVERY EVENING    albuterol-ipratropium (DUO-NEB) 2.5 mg-0.5 mg/3 mL nebulizer solution Take 3 mLs by nebulization every 6 (six) hours as needed for Wheezing. Rescue    blood sugar diagnostic (ACCU-CHEK GUIDE TEST STRIPS) Strp 1 strip by Misc.(Non-Drug; Combo Route) route 3 (three) times daily.    clopidogreL (PLAVIX) 75 mg tablet TAKE 1 TABLET ONE TIME DAILY    dapagliflozin  propanediol (FARXIGA) 5 mg Tab tablet Take 1 tablet (5 mg total) by mouth once daily.    docusate sodium (COLACE) 100 MG capsule Take 2 capsules (200 mg total) by mouth once daily.    ezetimibe (ZETIA) 10 mg tablet Take 1 tablet (10 mg total) by mouth once daily.    furosemide (LASIX) 40 MG tablet Take 1 tablet (40 mg total) by mouth once daily.    gabapentin (NEURONTIN) 800 MG tablet TAKE 1 TABLET FOUR TIMES DAILY    insulin aspart U-100 (NOVOLOG FLEXPEN U-100 INSULIN) 100 unit/mL (3 mL) InPn pen Inject 5-10 minutes before meals per sliding scale    insulin degludec (TRESIBA FLEXTOUCH U-100) 100 unit/mL (3 mL) insulin pen Inject 20 Units into the skin every evening.    mupirocin (BACTROBAN) 2 % ointment Apply topically 2 (two) times daily.    rivaroxaban (XARELTO) 20 mg Tab Take 1 tablet (20 mg total) by mouth daily with dinner or evening meal.    sacubitriL-valsartan (ENTRESTO) 24-26 mg per tablet Take 1 tablet by mouth 2 (two) times daily.    [DISCONTINUED] HYDROcodone-acetaminophen (NORCO) 5-325 mg per tablet Take 1 tablet by mouth every 6 (six) hours as needed for Pain. (Patient not taking: Reported on 11/22/2024)     Family History       Problem Relation (Age of Onset)    Cancer Mother    Heart failure Father          Tobacco Use    Smoking status: Every Day     Current packs/day: 0.25     Average packs/day: 0.5 packs/day for 49.9 years (24.7 ttl pk-yrs)     Types: Cigarettes     Start date: 1975    Smokeless tobacco: Never    Tobacco comments:     Patient states 4 singles a day   Substance and Sexual Activity    Alcohol use: Yes     Comment: occasionally    Drug use: Never    Sexual activity: Not Currently     Review of Systems   Constitutional:  Negative for activity change, chills, fatigue and fever.   HENT:  Negative for congestion and sore throat.    Respiratory:  Positive for cough, chest tightness, shortness of breath and wheezing.    Gastrointestinal:  Negative for abdominal distention, abdominal pain  and diarrhea.   Endocrine: Negative for cold intolerance and heat intolerance.   Genitourinary:  Negative for dysuria.   Musculoskeletal:  Negative for arthralgias and back pain.   Skin:  Negative for color change and rash.   Neurological:  Negative for dizziness, tremors and headaches.   Psychiatric/Behavioral:  Negative for agitation. The patient is not nervous/anxious.      Objective:     Vital Signs (Most Recent):  Temp: 98.2 °F (36.8 °C) (11/26/24 1445)  Pulse: 76 (11/26/24 1935)  Resp: (!) 22 (11/26/24 1935)  BP: 122/78 (11/26/24 1745)  SpO2: 96 % (11/26/24 1935) Vital Signs (24h Range):  Temp:  [97.6 °F (36.4 °C)-98.3 °F (36.8 °C)] 98.2 °F (36.8 °C)  Pulse:  [74-93] 76  Resp:  [13-26] 22  SpO2:  [80 %-97 %] 96 %  BP: (110-159)/(49-78) 122/78     Weight: 95.3 kg (210 lb)  Body mass index is 33.89 kg/m².     Physical Exam  Constitutional:       Appearance: She is ill-appearing.   HENT:      Head: Normocephalic and atraumatic.      Nose: Nose normal.      Mouth/Throat:      Mouth: Mucous membranes are moist.      Pharynx: Oropharynx is clear.   Eyes:      Extraocular Movements: Extraocular movements intact.      Conjunctiva/sclera: Conjunctivae normal.      Pupils: Pupils are equal, round, and reactive to light.   Cardiovascular:      Rate and Rhythm: Normal rate and regular rhythm.      Pulses: Normal pulses.      Heart sounds: Normal heart sounds.   Pulmonary:      Effort: Respiratory distress present.      Breath sounds: Wheezing and rales present.   Abdominal:      General: Abdomen is flat. Bowel sounds are normal.      Palpations: Abdomen is soft.   Musculoskeletal:         General: Normal range of motion.      Cervical back: Normal range of motion and neck supple.   Skin:     General: Skin is warm and dry.   Neurological:      General: No focal deficit present.      Mental Status: She is alert and oriented to person, place, and time.   Psychiatric:         Mood and Affect: Mood normal.         Behavior:  Behavior normal.          Significant Labs: All pertinent labs within the past 24 hours have been reviewed.    Significant Imaging: I have reviewed all pertinent imaging results/findings within the past 24 hours.

## 2024-11-27 NOTE — ASSESSMENT & PLAN NOTE
"Patient's FSGs are controlled on current medication regimen.  Last A1c reviewed-   Lab Results   Component Value Date    HGBA1C 10.0 (H) 07/09/2024     Most recent fingerstick glucose reviewed- No results for input(s): "POCTGLUCOSE" in the last 24 hours.  Current correctional scale  Medium  Maintain anti-hyperglycemic dose as follows-   Antihyperglycemics (From admission, onward)      Start     Stop Route Frequency Ordered    11/26/24 2025  insulin aspart U-100 injection 0-10 Units         -- SubQ Before meals & nightly PRN 11/26/24 1926          Hold Oral hypoglycemics while patient is in the hospital.  We will add basal insulin if she was remaining in the hospital  "

## 2024-11-27 NOTE — ASSESSMENT & PLAN NOTE
Mgmt per ortho.   Xarelto ordered since patient has factor V leiden deficiency with frequent clots.

## 2024-11-27 NOTE — ASSESSMENT & PLAN NOTE
"Patient's FSGs are controlled on current medication regimen.  Last A1c reviewed-   Lab Results   Component Value Date    HGBA1C 10.0 (H) 07/09/2024     Most recent fingerstick glucose reviewed- No results for input(s): "POCTGLUCOSE" in the last 24 hours.  Current correctional scale  Medium  Maintain anti-hyperglycemic dose as follows-   Antihyperglycemics (From admission, onward)      Start     Stop Route Frequency Ordered    11/26/24 2025  insulin aspart U-100 injection 0-10 Units         -- SubQ Before meals & nightly PRN 11/26/24 1926          Hold Oral hypoglycemics while patient is in the hospital.  "

## 2024-11-27 NOTE — HPI
Ms. Villar is a 58yo woman history of CHF (EF 40%), DVT on Xarelto, DM2 (10), CKD, Factor 5 leiden on Xarelto, HLD, alcohol dependence, CAD (3 stents 3 years ago), PVD (2 stents in each leg) who presents with left shoulder fracture. She is complaining of severe shortness of breath and requesting breathing treatments.

## 2024-11-27 NOTE — PROGRESS NOTES
[facility] Medicine  Progress Note    Patient Name: Kirk Villar  MRN: [id number]  Patient Class: OP- Outpatient Recovery   Admission Date: 11/26/2024  Length of Stay: 0 days  Attending Physician: Miguel Meyer MD  Primary Care Provider: Anabell Mendez NP        Subjective:     Principal Problem:Closed fracture of left shoulder        HPI:  Ms. Villar is a 58yo woman history of CHF (EF 40%), DVT on Xarelto, DM2 (10), CKD, Factor 5 leiden on Xarelto, HLD, alcohol dependence, CAD (3 stents 3 years ago), PVD (2 stents in each leg) who presents with left shoulder fracture. She is complaining of severe shortness of breath and requesting breathing treatments.     Overview/Hospital Course:  No notes on file    Interval History:  No acute events overnight    Review of Systems  Objective:     Vital Signs (Most Recent):  Temp: 97.7 °F (36.5 °C) (11/27/24 1126)  Pulse: 99 (11/27/24 1126)  Resp: 19 (11/27/24 1126)  BP: (!) 143/78 (11/27/24 1126)  SpO2: (!) 91 % (11/27/24 1126) Vital Signs (24h Range):  Temp:  [97.6 °F (36.4 °C)-98.3 °F (36.8 °C)] 97.7 °F (36.5 °C)  Pulse:  [60-99] 99  Resp:  [13-26] 19  SpO2:  [80 %-99 %] 91 %  BP: (110-165)/() 143/78     Weight: 95.3 kg (210 lb)  Body mass index is 33.89 kg/m².    Intake/Output Summary (Last 24 hours) at 11/27/2024 1132  Last data filed at 11/27/2024 0804  Gross per 24 hour   Intake 940 ml   Output 450 ml   Net 490 ml         Physical Exam  HENT:      Head: Normocephalic and atraumatic.      Nose: Nose normal.      Mouth/Throat:      Mouth: Mucous membranes are moist.      Pharynx: Oropharynx is clear.   Eyes:      Extraocular Movements: Extraocular movements intact.      Conjunctiva/sclera: Conjunctivae normal.      Pupils: Pupils are equal, round, and reactive to light.   Cardiovascular:      Rate and Rhythm: Normal rate and regular rhythm.      Pulses: Normal pulses.      Heart sounds: Normal heart sounds.  "  Abdominal:      General: Abdomen is flat. Bowel sounds are normal.      Palpations: Abdomen is soft.   Musculoskeletal:         General: Normal range of motion.      Cervical back: Normal range of motion and neck supple.   Skin:     General: Skin is warm and dry.   Neurological:      General: No focal deficit present.      Mental Status: She is alert and oriented to person, place, and time.   Psychiatric:         Mood and Affect: Mood normal.         Behavior: Behavior normal.             Significant Labs: All pertinent labs within the past 24 hours have been reviewed.    Significant Imaging: I have reviewed all pertinent imaging results/findings within the past 24 hours.    Assessment/Plan:      * Closed fracture of left shoulder  Mgmt per ortho.   Xarelto ordered since patient has factor V leiden deficiency with frequent clots.        COPD exacerbation  Patient's COPD is with exacerbation noted by continued dyspnea, use of accessory muscles for breathing, and worsening of baseline hypoxia currently.  Patient is currently off COPD Pathway. Continue scheduled inhalers Steroids, Antibiotics, and Supplemental oxygen and monitor respiratory status closely.     Continue Levaquin, transitioned to prednisone    She can continue these medications orally along with scheduled DuoNebs at home.    Chronic deep vein thrombosis (DVT) of proximal vein of lower extremity, unspecified laterality  Continue xarelto        Alcohol dependence with uncomplicated withdrawal  Monitor for withdrawal        Factor 5 Leiden mutation, heterozygous  High risk for recurrent clots.   Continue Xarelto.       Uncontrolled type 2 diabetes mellitus with hyperglycemia  Patient's FSGs are controlled on current medication regimen.  Last A1c reviewed-   Lab Results   Component Value Date    HGBA1C 10.0 (H) 07/09/2024     Most recent fingerstick glucose reviewed- No results for input(s): "POCTGLUCOSE" in the last 24 hours.  Current correctional scale  " Medium  Maintain anti-hyperglycemic dose as follows-   Antihyperglycemics (From admission, onward)      Start     Stop Route Frequency Ordered    11/26/24 2025  insulin aspart U-100 injection 0-10 Units         -- SubQ Before meals & nightly PRN 11/26/24 1926          Hold Oral hypoglycemics while patient is in the hospital.  We will add basal insulin if she was remaining in the hospital    Heart failure with mid-range ejection fraction (HFmEF)  IV lasix as tolerated.   Transitioned to oral Lasix.  Respiratory status improved      VTE Risk Mitigation (From admission, onward)           Ordered     rivaroxaban tablet 20 mg  With dinner         11/26/24 1926     IP VTE HIGH RISK PATIENT  Once         11/26/24 1400     Place OBI hose  Until discontinued        Comments: OBI to BLE    11/26/24 1400     Place sequential compression device  Until discontinued        Comments: BLE    11/26/24 1400                    Discharge Planning   EDWARD: 11/27/2024     Code Status: Full Code   Is the patient medically ready for discharge?:     Reason for patient still in hospital (select all that apply): Treatment  Discharge Plan A: Home   Discharge Delays: None known at this time        51 minutes spent on face to face patient interaction, discussion with family, ancillary staff, and/or other physicians as well as review of pertinent labs, images, and notes.     Given 1 time dose of Dilaudid.  Now on oral Lasix.  Transitioned from methylprednisolone to prednisone.  Continue DuoNebs.  Chest x-ray from the 21st reviewed and independently interpreted no obvious effusion or consolidation.  Labs and consultant notes reviewed.  Started on Lantus.  Metabolic panel in the morning if she remains in the hospital    Newton Lord DO  Department of Hospital Medicine   [facility] - [address] [place] [address]

## 2024-11-27 NOTE — DISCHARGE INSTRUCTIONS
Continue previously prescribed anticoagulation therapy.     Diet: Diabetic, Heart healthy  Keep surgical extremity elevated.  Ice to affected area.  Lifting restrictions: No lifting, pushing, or pulling with right arm, but okay to move hand, wrist, and elbow.  Continue wearing arm sling. Okay for range of motion exercises of hand wrist and elbow keeping shoulder immobilized.   Notify your health care provider if you experience any of the following:  *redness, tenderness, or signs or symptoms of infection (pain, swelling, redness, odor, or green/yellow drainage around incision site).  *severe, uncontrolled pain.  *persistent nausea, vomiting, diarrhea, or headache.  *temperature >100.4.  Remove dressing in 72 hours. Keep incision dry, when showering.  Shower on day dressing is removed (No bath).  Notify Dr Andres if you have any issues or concerns.

## 2024-11-27 NOTE — CONSULTS
[facility] Medicine  Consult Note    Patient Name: Kirk Villar  MRN: [id number]  Admission Date: 11/26/2024  Hospital Length of Stay: 0 days  Attending Physician: Miguel Meyer MD   Primary Care Provider: Anabell Mendez NP           Patient information was obtained from patient, past medical records, and ER records.     Inpatient consult to Hospitalist  Consult performed by: Bea Trejo MD  Consult ordered by: Miguel Meyer MD        Subjective:     Principal Problem:Closed fracture of left shoulder    Chief Complaint: No chief complaint on file.       HPI: Ms. Villar is a 60yo woman history of CHF (EF 40%), DVT on Xarelto, DM2 (10), CKD, Factor 5 leiden on Xarelto, HLD, alcohol dependence, CAD (3 stents 3 years ago), PVD (2 stents in each leg) who presents with left shoulder fracture.  She is complaining of severe shortness of breath and requesting breathing treatments.         Past Medical History:   Diagnosis Date    ACC/AHA stage C congestive heart failure due to ischemic cardiomyopathy 03/12/2024    ACE inhibitor intolerance 03/12/2024    Alcohol dependence with uncomplicated withdrawal 07/09/2024    Broken shoulder 11/02/2024    right shoulder    CAD (coronary artery disease) 07/09/2024    Chronic deep vein thrombosis (DVT) of proximal vein of lower extremity, unspecified laterality 09/03/2024    CKD stage 3b, GFR 30-44 ml/min 07/11/2024    Diabetes mellitus, type 2     Episode of recurrent major depressive disorder, unspecified depression episode severity 09/03/2024    Factor 5 Leiden mutation, heterozygous     Familial hyperlipidemia 03/12/2024    Plan to (re) initiate crestor  Did not tolerate lipitor         History reviewed. No pertinent surgical history.    Review of patient's allergies indicates:  No Known Allergies    No current facility-administered medications on file prior to encounter.     Current Outpatient Medications  on File Prior to Encounter   Medication Sig    albuterol (VENTOLIN HFA) 90 mcg/actuation inhaler Inhale 2 puffs into the lungs every 6 (six) hours as needed for Wheezing. Rescue    citalopram (CELEXA) 40 MG tablet TAKE 1 TABLET ONE TIME DAILY    cyclobenzaprine (FLEXERIL) 10 MG tablet TAKE 1 TABLET TWICE DAILY AS NEEDED FOR MUSCLE SPASM(S)    metoprolol succinate (TOPROL-XL) 25 MG 24 hr tablet Take 1 tablet (25 mg total) by mouth 2 (two) times daily.    rosuvastatin (CRESTOR) 20 MG tablet Take 1 tablet (20 mg total) by mouth once daily.    traZODone (DESYREL) 100 MG tablet TAKE 1 TABLET EVERY EVENING    albuterol-ipratropium (DUO-NEB) 2.5 mg-0.5 mg/3 mL nebulizer solution Take 3 mLs by nebulization every 6 (six) hours as needed for Wheezing. Rescue    blood sugar diagnostic (ACCU-CHEK GUIDE TEST STRIPS) Strp 1 strip by Misc.(Non-Drug; Combo Route) route 3 (three) times daily.    clopidogreL (PLAVIX) 75 mg tablet TAKE 1 TABLET ONE TIME DAILY    dapagliflozin propanediol (FARXIGA) 5 mg Tab tablet Take 1 tablet (5 mg total) by mouth once daily.    docusate sodium (COLACE) 100 MG capsule Take 2 capsules (200 mg total) by mouth once daily.    ezetimibe (ZETIA) 10 mg tablet Take 1 tablet (10 mg total) by mouth once daily.    furosemide (LASIX) 40 MG tablet Take 1 tablet (40 mg total) by mouth once daily.    gabapentin (NEURONTIN) 800 MG tablet TAKE 1 TABLET FOUR TIMES DAILY    insulin aspart U-100 (NOVOLOG FLEXPEN U-100 INSULIN) 100 unit/mL (3 mL) InPn pen Inject 5-10 minutes before meals per sliding scale    insulin degludec (TRESIBA FLEXTOUCH U-100) 100 unit/mL (3 mL) insulin pen Inject 20 Units into the skin every evening.    mupirocin (BACTROBAN) 2 % ointment Apply topically 2 (two) times daily.    rivaroxaban (XARELTO) 20 mg Tab Take 1 tablet (20 mg total) by mouth daily with dinner or evening meal.    sacubitriL-valsartan (ENTRESTO) 24-26 mg per tablet Take 1 tablet by mouth 2 (two) times daily.    [DISCONTINUED]  HYDROcodone-acetaminophen (NORCO) 5-325 mg per tablet Take 1 tablet by mouth every 6 (six) hours as needed for Pain. (Patient not taking: Reported on 11/22/2024)     Family History       Problem Relation (Age of Onset)    Cancer Mother    Heart failure Father          Tobacco Use    Smoking status: Every Day     Current packs/day: 0.25     Average packs/day: 0.5 packs/day for 49.9 years (24.7 ttl pk-yrs)     Types: Cigarettes     Start date: 1975    Smokeless tobacco: Never    Tobacco comments:     Patient states 4 singles a day   Substance and Sexual Activity    Alcohol use: Yes     Comment: occasionally    Drug use: Never    Sexual activity: Not Currently     Review of Systems   Constitutional:  Negative for activity change, chills, fatigue and fever.   HENT:  Negative for congestion and sore throat.    Respiratory:  Positive for cough, shortness of breath and wheezing. Negative for chest tightness.    Gastrointestinal:  Negative for abdominal distention, abdominal pain and diarrhea.   Endocrine: Negative for cold intolerance and heat intolerance.   Genitourinary:  Negative for dysuria.   Musculoskeletal:  Negative for arthralgias and back pain.   Skin:  Negative for color change and rash.   Neurological:  Negative for dizziness, tremors and headaches.   Psychiatric/Behavioral:  Negative for agitation. The patient is not nervous/anxious.      Objective:     Vital Signs (Most Recent):  Temp: 98.2 °F (36.8 °C) (11/26/24 1445)  Pulse: 74 (11/26/24 1745)  Resp: 18 (11/26/24 1745)  BP: 122/78 (11/26/24 1745)  SpO2: 96 % (11/26/24 1745) Vital Signs (24h Range):  Temp:  [97.6 °F (36.4 °C)-98.3 °F (36.8 °C)] 98.2 °F (36.8 °C)  Pulse:  [74-93] 74  Resp:  [13-26] 18  SpO2:  [80 %-97 %] 96 %  BP: (110-159)/(49-78) 122/78     Weight: 95.3 kg (210 lb)  Body mass index is 33.89 kg/m².    Physical Exam  Constitutional:       Appearance: She is ill-appearing.   HENT:      Head: Normocephalic and atraumatic.      Nose: Nose  normal.      Mouth/Throat:      Mouth: Mucous membranes are moist.      Pharynx: Oropharynx is clear.   Eyes:      Extraocular Movements: Extraocular movements intact.      Conjunctiva/sclera: Conjunctivae normal.      Pupils: Pupils are equal, round, and reactive to light.   Cardiovascular:      Rate and Rhythm: Normal rate and regular rhythm.      Pulses: Normal pulses.      Heart sounds: Normal heart sounds.   Pulmonary:      Effort: Respiratory distress present.      Breath sounds: Wheezing and rales present.   Abdominal:      General: Abdomen is flat. Bowel sounds are normal.      Palpations: Abdomen is soft.   Musculoskeletal:         General: Normal range of motion.      Cervical back: Normal range of motion and neck supple.   Skin:     General: Skin is warm and dry.   Neurological:      General: No focal deficit present.      Mental Status: She is alert and oriented to person, place, and time.   Psychiatric:         Mood and Affect: Mood normal.         Behavior: Behavior normal.         Significant Labs: All pertinent labs within the past 24 hours have been reviewed.    Significant Imaging: I have reviewed all pertinent imaging results/findings within the past 24 hours.    Assessment/Plan:     Active Diagnoses:    Diagnosis Date Noted POA    PRINCIPAL PROBLEM:  Closed fracture of left shoulder [S42.92XA] 11/22/2024 Yes      Problems Resolved During this Admission:     VTE Risk Mitigation (From admission, onward)           Ordered     IP VTE HIGH RISK PATIENT  Once         11/26/24 1400     Place OBI hose  Until discontinued        Comments: OBI to BLE    11/26/24 1400     Place sequential compression device  Until discontinued        Comments: BLE    11/26/24 1400                    HOS POC IP DISCHARGE SUMMARY    Thank you for your consult. I will follow-up with patient. Please contact us if you have any additional questions.    Bea Trejo MD  Department of Hospital Medicine   [facility]  - [facility]

## 2024-11-27 NOTE — ANESTHESIA POSTPROCEDURE EVALUATION
Anesthesia Post Evaluation    Patient: Kirk Villar    Procedure(s) Performed: Procedure(s) (LRB):  ARTHROPLASTY, SHOULDER, TOTAL, REVERSE (Right)    Final Anesthesia Type: general      Patient location during evaluation: PACU  Post-procedure vital signs: reviewed and stable  Pain management: adequate  Airway patency: patent  BERNABE mitigation strategies: Multimodal analgesia  PONV status at discharge: No PONV  Anesthetic complications: no      Cardiovascular status: hemodynamically stable  Respiratory status: unassisted  Hydration status: euvolemic  Follow-up not needed.              Vitals Value Taken Time   /78 11/27/24 1126   Temp 36.5 °C (97.7 °F) 11/27/24 1126   Pulse 99 11/27/24 1126   Resp 20 11/27/24 1136   SpO2 91 % 11/27/24 1126         Event Time   Out of Recovery 14:00:00         Pain/Jermaine Score: Pain Rating Prior to Med Admin: 9 (11/27/2024 11:36 AM)  Pain Rating Post Med Admin: 4 (11/27/2024  6:46 AM)  Jermaine Score: 9 (11/27/2024  7:32 AM)

## 2024-11-27 NOTE — PT/OT/SLP EVAL
Physical Therapy Evaluation    Patient Name:  Kirk Villar   MRN:  [id number]    Recommendations:     Discharge Recommendations: No Therapy Indicated   Discharge Equipment Recommendations: none   Barriers to discharge: Decreased caregiver support    Assessment:     Kirk Villar is a 59 y.o. female admitted with a medical diagnosis of Closed fracture of left shoulder.  She presents with the following impairments/functional limitations: orthopedic precautions Patient anxious about pain and hurting. Patient upset she hasn't dc'd yet. Lives alone but will have friend to assist if needed.    Rehab Prognosis: Good; patient would benefit from acute skilled PT services to address these deficits and reach maximum level of function.    Recent Surgery: Procedure(s) (LRB):  ARTHROPLASTY, SHOULDER, TOTAL, REVERSE (Right) 1 Day Post-Op    Plan:     During this hospitalization, patient to be seen 1 x/week to address the identified rehab impairments via therapeutic activities and progress toward the following goals:    Plan of Care Expires:  11/27/24    Subjective     Chief Complaint: post op pain  Patient/Family Comments/goals: Patient up set she can't find her sling and upset she hasn't d/c'd   Pain/Comfort:  Pain Rating 1: 8/10  Location - Side 1: Right  Location 1: shoulder  Pain Addressed 1: Cessation of Activity, Pre-medicate for activity    Patients cultural, spiritual, [employer]ian conflicts given the current situation: no    Living Environment:  Lives with family  Prior to admission, patients level of function was independent.  Equipment used at home: none.  DME owned (not currently used): none.  Upon discharge, patient will have assistance from friend.    Objective:     Communicated with nurse prior to session.  Patient found supine with    upon PT entry to room.    General Precautions: Standard, fall  Orthopedic Precautions:    Braces: UE Sling  Respiratory Status: Room air    Exams:  na    Functional Mobility:  Bed  Mobility:     Supine to Sit: contact guard assistance  Transfers:     Sit to Stand:  contact guard assistance with no AD      AM-PAC 6 CLICK MOBILITY  Total Score:24       Treatment & Education:  Don/doff shirt and sling  ROM: hand/elbow rom    Patient left supine with call button in reach.    GOALS:   Multidisciplinary Problems       Physical Therapy Goals       Not on file                    History:     Past Medical History:   Diagnosis Date    ACC/AHA stage C congestive heart failure due to ischemic cardiomyopathy 03/12/2024    ACE inhibitor intolerance 03/12/2024    Alcohol dependence with uncomplicated withdrawal 07/09/2024    Broken shoulder 11/02/2024    right shoulder    CAD (coronary artery disease) 07/09/2024    Chronic deep vein thrombosis (DVT) of proximal vein of lower extremity, unspecified laterality 09/03/2024    CKD stage 3b, GFR 30-44 ml/min 07/11/2024    Diabetes mellitus, type 2     Episode of recurrent major depressive disorder, unspecified depression episode severity 09/03/2024    Factor 5 Leiden mutation, heterozygous     Familial hyperlipidemia 03/12/2024    Plan to (re) initiate crestor  Did not tolerate lipitor         Past Surgical History:   Procedure Laterality Date    REVERSE TOTAL SHOULDER ARTHROPLASTY Right 11/26/2024    Procedure: ARTHROPLASTY, SHOULDER, TOTAL, REVERSE;  Surgeon: Miguel Meyer MD;  Location: [facility];  Service: Orthopedics;  Laterality: Right;       Time Tracking:     PT Received On: 11/27/24  PT Start Time: 0900     PT Stop Time: 0920  PT Total Time (min): 20 min     Billable Minutes: Evaluation 20      11/27/2024

## 2024-11-27 NOTE — ASSESSMENT & PLAN NOTE
Patient's COPD is with exacerbation noted by continued dyspnea, use of accessory muscles for breathing, and worsening of baseline hypoxia currently.  Patient is currently off COPD Pathway. Continue scheduled inhalers Steroids, Antibiotics, and Supplemental oxygen and monitor respiratory status closely.

## 2024-11-27 NOTE — SUBJECTIVE & OBJECTIVE
Interval History:  No acute events overnight    Review of Systems  Objective:     Vital Signs (Most Recent):  Temp: 97.7 °F (36.5 °C) (11/27/24 1126)  Pulse: 99 (11/27/24 1126)  Resp: 19 (11/27/24 1126)  BP: (!) 143/78 (11/27/24 1126)  SpO2: (!) 91 % (11/27/24 1126) Vital Signs (24h Range):  Temp:  [97.6 °F (36.4 °C)-98.3 °F (36.8 °C)] 97.7 °F (36.5 °C)  Pulse:  [60-99] 99  Resp:  [13-26] 19  SpO2:  [80 %-99 %] 91 %  BP: (110-165)/() 143/78     Weight: 95.3 kg (210 lb)  Body mass index is 33.89 kg/m².    Intake/Output Summary (Last 24 hours) at 11/27/2024 1132  Last data filed at 11/27/2024 0804  Gross per 24 hour   Intake 940 ml   Output 450 ml   Net 490 ml         Physical Exam  HENT:      Head: Normocephalic and atraumatic.      Nose: Nose normal.      Mouth/Throat:      Mouth: Mucous membranes are moist.      Pharynx: Oropharynx is clear.   Eyes:      Extraocular Movements: Extraocular movements intact.      Conjunctiva/sclera: Conjunctivae normal.      Pupils: Pupils are equal, round, and reactive to light.   Cardiovascular:      Rate and Rhythm: Normal rate and regular rhythm.      Pulses: Normal pulses.      Heart sounds: Normal heart sounds.   Abdominal:      General: Abdomen is flat. Bowel sounds are normal.      Palpations: Abdomen is soft.   Musculoskeletal:         General: Normal range of motion.      Cervical back: Normal range of motion and neck supple.   Skin:     General: Skin is warm and dry.   Neurological:      General: No focal deficit present.      Mental Status: She is alert and oriented to person, place, and time.   Psychiatric:         Mood and Affect: Mood normal.         Behavior: Behavior normal.             Significant Labs: All pertinent labs within the past 24 hours have been reviewed.    Significant Imaging: I have reviewed all pertinent imaging results/findings within the past 24 hours.

## 2024-11-27 NOTE — DISCHARGE SUMMARY
Department of Orthopedic Surgery  Discharge Note    Admit Date: 11/26/2024  Discharge Date and Time: 11/27/2024   Attending Physician: Miguel Meyer MD   Discharge Provider: Yina Lopez    Pre-op Diagnosis: Humeral head fracture, right, closed, initial encounter [S42.291A]  Closed fracture of left shoulder [S42.92XA]  Procedure:  Reverse right total shoulder arthroplasty  Final Diagnosis:     Disposition: Home or Self Care  Discharged Condition: good    Hospital Course:   Patient came in on 11/26/2024 and underwent reverse right total shoulder arthroplasty without complications. POD1 patient resting comfortably in bed this morning without complaint.  Okay to DC home today.  Continue wearing arm sling.  Okay for range of motion exercises of hand wrist and elbow keeping shoulder immobilized.  Remove dressing at postop day 3 and shower keeping incision dry.  Patient will go home with Raven 10/325 as needed for pain.  Continue previously prescribed anticoagulation therapy.  Follow up with the orthopedic clinic in 2 weeks.    Physical Exam:  Right shoulder with surgical dressing clean dry and intact, deltoid firing appropriately, neurovascularly intact  Surgical incision is clean,dry,intact with minimal/expected erythema and swelling    Discharge Instructions:   Discharge Procedure Orders (must include Diet, Follow-up, Activity)   Diet Adult Regular     Keep surgical extremity elevated     Ice to affected area     Lifting restrictions   Order Comments: No lifting pushing pulling with the right upper extremity okay to move hand wrist and elbow     Notify your health care provider if you experience any of the following:  redness, tenderness, or signs of infection (pain, swelling, redness, odor or green/yellow discharge around incision site)     Notify your health care provider if you experience any of the following:  severe uncontrolled pain     Notify your health care provider if you experience any of the  following:  persistent nausea and vomiting or diarrhea     Notify your health care provider if you experience any of the following:  temperature >100.4      Remove dressing in 72 hours     Shower on day dressing removed (No bath)        Medications:     Medication List        START taking these medications      HYDROcodone-acetaminophen  mg per tablet  Commonly known as: NORCO  Take 1 tablet by mouth every 6 (six) hours as needed for Pain.  Replaces: HYDROcodone-acetaminophen 7.5-325 mg per tablet            CONTINUE taking these medications      albuterol 90 mcg/actuation inhaler  Commonly known as: VENTOLIN HFA  Inhale 2 puffs into the lungs every 6 (six) hours as needed for Wheezing. Rescue     albuterol-ipratropium 2.5 mg-0.5 mg/3 mL nebulizer solution  Commonly known as: DUO-NEB  Take 3 mLs by nebulization every 6 (six) hours as needed for Wheezing. Rescue     blood sugar diagnostic Strp  Commonly known as: ACCU-CHEK GUIDE TEST STRIPS  1 strip by Misc.(Non-Drug; Combo Route) route 3 (three) times daily.     citalopram 40 MG tablet  Commonly known as: CeleXA  TAKE 1 TABLET ONE TIME DAILY     clopidogreL 75 mg tablet  Commonly known as: PLAVIX  TAKE 1 TABLET ONE TIME DAILY     cyclobenzaprine 10 MG tablet  Commonly known as: FLEXERIL  TAKE 1 TABLET TWICE DAILY AS NEEDED FOR MUSCLE SPASM(S)     dapagliflozin propanediol 5 mg Tab tablet  Commonly known as: FARXIGA  Take 1 tablet (5 mg total) by mouth once daily.     docusate sodium 100 MG capsule  Commonly known as: COLACE  Take 2 capsules (200 mg total) by mouth once daily.     ENTRESTO 24-26 mg per tablet  Generic drug: sacubitriL-valsartan  Take 1 tablet by mouth 2 (two) times daily.     ezetimibe 10 mg tablet  Commonly known as: ZETIA  Take 1 tablet (10 mg total) by mouth once daily.     furosemide 40 MG tablet  Commonly known as: LASIX  Take 1 tablet (40 mg total) by mouth once daily.     gabapentin 800 MG tablet  Commonly known as: NEURONTIN  TAKE 1  TABLET FOUR TIMES DAILY     insulin aspart U-100 100 unit/mL (3 mL) Inpn pen  Commonly known as: NovoLOG Flexpen U-100 Insulin  Inject 5-10 minutes before meals per sliding scale     insulin degludec 100 unit/mL (3 mL) insulin pen  Commonly known as: TRESIBA FLEXTOUCH U-100  Inject 20 Units into the skin every evening.     metoprolol succinate 25 MG 24 hr tablet  Commonly known as: TOPROL-XL  Take 1 tablet (25 mg total) by mouth 2 (two) times daily.     mupirocin 2 % ointment  Commonly known as: BACTROBAN  Apply topically 2 (two) times daily.     rivaroxaban 20 mg Tab  Commonly known as: XARELTO  Take 1 tablet (20 mg total) by mouth daily with dinner or evening meal.     rosuvastatin 20 MG tablet  Commonly known as: CRESTOR  Take 1 tablet (20 mg total) by mouth once daily.     traZODone 100 MG tablet  Commonly known as: DESYREL  TAKE 1 TABLET EVERY EVENING            STOP taking these medications      HYDROcodone-acetaminophen 7.5-325 mg per tablet  Commonly known as: NORCO  Replaced by: HYDROcodone-acetaminophen  mg per tablet               Where to Get Your Medications        These medications were sent to [facility]  [address] [place] [address]      Hours: Mon-Fri, 8:30a-5:00p Phone: [phone]   HYDROcodone-acetaminophen  mg per tablet           Follow up/Patient Instructions:     Follow-up Information       Yina Lopez PA Follow up in 2 week(s).    Specialty: Orthopedic Surgery  Contact information:  [address] [place] [id number]  [phone]

## 2024-12-02 ENCOUNTER — TELEPHONE (OUTPATIENT)
Dept: ORTHOPEDICS | Facility: CLINIC | Age: 59
End: 2024-12-02
Payer: MEDICARE

## 2024-12-02 DIAGNOSIS — S42.291A HUMERAL HEAD FRACTURE, RIGHT, CLOSED, INITIAL ENCOUNTER: Primary | ICD-10-CM

## 2024-12-02 RX ORDER — HYDROCODONE BITARTRATE AND ACETAMINOPHEN 7.5; 325 MG/1; MG/1
1 TABLET ORAL EVERY 6 HOURS PRN
Qty: 28 TABLET | Refills: 0 | Status: SHIPPED | OUTPATIENT
Start: 2024-12-04 | End: 2024-12-11

## 2024-12-02 NOTE — TELEPHONE ENCOUNTER
----- Message from Natalie sent at 11/29/2024 11:33 AM CST -----  Regarding: talk to a nurse  Who Called: Kirk Villar    Caller is requesting assistance/information from provider's office.    Symptoms (please be specific): needs to talk to a nurse about a bandage          Preferred Method of Contact: Phone Call  Patient's Preferred Phone Number on File: [phone]   Best Call Back Number, if different:  Additional Information:

## 2024-12-02 NOTE — TELEPHONE ENCOUNTER
Talked with patient about bandage. Let patient know she does not have to put another bandage back on incision site; she can leave it open to air. Patient also requested pain medication refill. Let her know Dr. Meyer is out today and will not be back in office until Wednesday, but I will get with JENNIFER Guzman and see if we can get a new prescription sent in for her. Patient verbalized understanding and thankful.

## 2024-12-03 ENCOUNTER — TELEPHONE (OUTPATIENT)
Dept: ORTHOPEDICS | Facility: CLINIC | Age: 59
End: 2024-12-03
Payer: MEDICARE

## 2024-12-03 NOTE — TELEPHONE ENCOUNTER
Talked with patient. Let her know the swelling is normal. She needs to keep it elevated as much as possible and keep using ice therapy to help with the swelling. Patient verbalized understanding and was thankful.

## 2024-12-03 NOTE — TELEPHONE ENCOUNTER
----- Message from Dyan sent at 12/3/2024  3:38 PM CST -----  Regarding: Talk to Nurse  Who Called: Kirk Villar    Caller is requesting assistance/information from provider's office.    Symptoms (please be specific): Patient says she is concerned about swelling in her right arm after surgery and would like to speak to a nurse.     Preferred Method of Contact: Phone Call  Patient's Preferred Phone Number on File: [phone]

## 2024-12-05 ENCOUNTER — HOSPITAL ENCOUNTER (OUTPATIENT)
Dept: RADIOLOGY | Facility: HOSPITAL | Age: 59
Discharge: HOME | End: 2024-12-05
Attending: HOSPITALIST
Payer: MEDICARE

## 2024-12-05 DIAGNOSIS — R60.1 GENERALIZED EDEMA: ICD-10-CM

## 2024-12-05 DIAGNOSIS — I73.9 CLAUDICATION: ICD-10-CM

## 2024-12-11 ENCOUNTER — HOSPITAL ENCOUNTER (OUTPATIENT)
Dept: RADIOLOGY | Facility: HOSPITAL | Age: 59
Discharge: HOME | End: 2024-12-11
Payer: MEDICARE

## 2024-12-11 ENCOUNTER — OFFICE VISIT (OUTPATIENT)
Dept: ORTHOPEDICS | Facility: CLINIC | Age: 59
End: 2024-12-11
Payer: MEDICARE

## 2024-12-11 DIAGNOSIS — Z96.611 S/P REVERSE TOTAL SHOULDER ARTHROPLASTY, RIGHT: Primary | ICD-10-CM

## 2024-12-11 DIAGNOSIS — M25.511 RIGHT SHOULDER PAIN, UNSPECIFIED CHRONICITY: Primary | ICD-10-CM

## 2024-12-11 DIAGNOSIS — Z96.611 S/P REVERSE TOTAL SHOULDER ARTHROPLASTY, RIGHT: ICD-10-CM

## 2024-12-11 DIAGNOSIS — S42.291A HUMERAL HEAD FRACTURE, RIGHT, CLOSED, INITIAL ENCOUNTER: Primary | ICD-10-CM

## 2024-12-11 DIAGNOSIS — M25.511 RIGHT SHOULDER PAIN, UNSPECIFIED CHRONICITY: ICD-10-CM

## 2024-12-11 PROCEDURE — 99212 OFFICE O/P EST SF 10 MIN: CPT | Mod: PBBFAC,25

## 2024-12-11 PROCEDURE — 99024 POSTOP FOLLOW-UP VISIT: CPT | Mod: ,,,

## 2024-12-11 PROCEDURE — 73030 X-RAY EXAM OF SHOULDER: CPT | Mod: TC,RT

## 2024-12-11 PROCEDURE — 99999 PR PBB SHADOW E&M-EST. PATIENT-LVL II: CPT | Mod: PBBFAC,,,

## 2024-12-11 PROCEDURE — 73030 X-RAY EXAM OF SHOULDER: CPT | Mod: 26,RT,, | Performed by: RADIOLOGY

## 2024-12-11 RX ORDER — HYDROCODONE BITARTRATE AND ACETAMINOPHEN 5; 325 MG/1; MG/1
1 TABLET ORAL EVERY 6 HOURS PRN
Qty: 28 TABLET | Refills: 0 | Status: SHIPPED | OUTPATIENT
Start: 2024-12-11 | End: 2024-12-18

## 2024-12-12 ENCOUNTER — OFFICE VISIT (OUTPATIENT)
Dept: FAMILY MEDICINE | Facility: CLINIC | Age: 59
End: 2024-12-12
Payer: MEDICARE

## 2024-12-12 VITALS
OXYGEN SATURATION: 94 % | TEMPERATURE: 98 F | SYSTOLIC BLOOD PRESSURE: 132 MMHG | BODY MASS INDEX: 35.52 KG/M2 | HEIGHT: 66 IN | DIASTOLIC BLOOD PRESSURE: 74 MMHG | RESPIRATION RATE: 20 BRPM | HEART RATE: 101 BPM | WEIGHT: 221 LBS

## 2024-12-12 DIAGNOSIS — R06.2 WHEEZING: ICD-10-CM

## 2024-12-12 DIAGNOSIS — J44.9 CHRONIC OBSTRUCTIVE PULMONARY DISEASE, UNSPECIFIED COPD TYPE: ICD-10-CM

## 2024-12-12 DIAGNOSIS — I50.9 CONGESTIVE HEART FAILURE, UNSPECIFIED HF CHRONICITY, UNSPECIFIED HEART FAILURE TYPE: Primary | ICD-10-CM

## 2024-12-12 PROCEDURE — 1160F RVW MEDS BY RX/DR IN RCRD: CPT | Mod: CPTII,,, | Performed by: NURSE PRACTITIONER

## 2024-12-12 PROCEDURE — 3075F SYST BP GE 130 - 139MM HG: CPT | Mod: CPTII,,, | Performed by: NURSE PRACTITIONER

## 2024-12-12 PROCEDURE — 3052F HG A1C>EQUAL 8.0%<EQUAL 9.0%: CPT | Mod: CPTII,,, | Performed by: NURSE PRACTITIONER

## 2024-12-12 PROCEDURE — 96372 THER/PROPH/DIAG INJ SC/IM: CPT | Mod: ,,, | Performed by: NURSE PRACTITIONER

## 2024-12-12 PROCEDURE — 1159F MED LIST DOCD IN RCRD: CPT | Mod: CPTII,,, | Performed by: NURSE PRACTITIONER

## 2024-12-12 PROCEDURE — 3078F DIAST BP <80 MM HG: CPT | Mod: CPTII,,, | Performed by: NURSE PRACTITIONER

## 2024-12-12 PROCEDURE — 3008F BODY MASS INDEX DOCD: CPT | Mod: CPTII,,, | Performed by: NURSE PRACTITIONER

## 2024-12-12 PROCEDURE — 99213 OFFICE O/P EST LOW 20 MIN: CPT | Mod: 25,,, | Performed by: NURSE PRACTITIONER

## 2024-12-12 PROCEDURE — 4010F ACE/ARB THERAPY RXD/TAKEN: CPT | Mod: CPTII,,, | Performed by: NURSE PRACTITIONER

## 2024-12-12 RX ORDER — DEXAMETHASONE SODIUM PHOSPHATE 4 MG/ML
8 INJECTION, SOLUTION INTRA-ARTICULAR; INTRALESIONAL; INTRAMUSCULAR; INTRAVENOUS; SOFT TISSUE
Status: COMPLETED | OUTPATIENT
Start: 2024-12-12 | End: 2024-12-12

## 2024-12-12 RX ORDER — METHYLPREDNISOLONE 4 MG/1
TABLET ORAL
Qty: 21 TABLET | Refills: 0 | Status: SHIPPED | OUTPATIENT
Start: 2024-12-12

## 2024-12-12 RX ADMIN — DEXAMETHASONE SODIUM PHOSPHATE 8 MG: 4 INJECTION, SOLUTION INTRA-ARTICULAR; INTRALESIONAL; INTRAMUSCULAR; INTRAVENOUS; SOFT TISSUE at 03:12

## 2024-12-20 ENCOUNTER — HOSPITAL ENCOUNTER (OUTPATIENT)
Dept: RADIOLOGY | Facility: HOSPITAL | Age: 59
Discharge: HOME | End: 2024-12-20
Attending: HOSPITALIST
Payer: MEDICARE

## 2024-12-20 PROCEDURE — 93925 LOWER EXTREMITY STUDY: CPT | Mod: 26,,, | Performed by: RADIOLOGY

## 2024-12-20 PROCEDURE — 93925 LOWER EXTREMITY STUDY: CPT | Mod: TC

## 2024-12-20 PROCEDURE — 93922 UPR/L XTREMITY ART 2 LEVELS: CPT | Mod: 26,,, | Performed by: RADIOLOGY

## 2024-12-23 NOTE — PROGRESS NOTES
Anabell Mendez NP   [address] [place] [address]     PATIENT NAME: Kirk Villar  : 1965  DATE: 24  MRN: [id number]      Billing Provider: Anabell Mendez NP  Level of Service: [place] OFFICE/OUTPT VISIT, EST, LEVL III, 20-29 MIN  Patient PCP Information       Provider PCP Type    Anabell Mendez NP General            Reason for Visit / Chief Complaint: Chest Congestion (Is doing breathing treatment and admits that helps her cough ) and Cough       Update PCP  Update Chief Complaint         History of Present Illness / Problem Focused Workflow     Kirk Villar presents to the clinic with Chest Congestion (Is doing breathing treatment and admits that helps her cough ) and Cough     History of Present Illness    HPI:  Patient reports increased edema, particularly in her extremities, with leg fluid retention that has decreased today. The edema gradually worsened over time but has now stabilized. She has dyspnea with productive cough and uses a nebulizer every 4-6 hours as needed. Due to severe breathing difficulties, she slept in an upright position in a recliner. Patient has anxiety related to her breathing difficulties and sometimes feels suffocated even when her oxygen levels are not critically low.    Patient is recovering from a recent fall in her kitchen where she injured her dominant arm humerus, resulting in surgery. This injury has significantly impacted her daily activities as she lives alone. Recovery is expected to take about 6 months, according to her doctor.    Patient monitors her SpO2 levels at home. Her glucose was 300 mg/dL before lunch today, and she took 10 units of insulin in response. During a recent hospitalization for a myocardial infarction, her glucose had reached 700 mg/dL.    Patient recently saw a pulmonologist but was dissatisfied with the care, feeling that her concerns about congestive heart failure were dismissed and the focus was solely on asthma. She is  considering seeking a second opinion.    MEDICATIONS:  Patient is on Furosemide, up to 4 times daily as needed for fluid retention. She is also using Albuterol via nebulizer every 4-6 hours as needed for breathing. For diabetes management, she takes 10 units of Insulin before lunch.    MEDICAL HISTORY:  Patient has a history of myocardial infarction, congestive heart failure, asthma, COPD (Chronic Obstructive Pulmonary Disease), and diabetes.    SURGICAL HISTORY:  Patient recently underwent a complete shoulder reversal due to falling and injuring her humerus.    TEST RESULTS:  Patient's glucose level prior to lunch was 300 mg/dL. During her myocardial infarction, her glucose level was approximately 700 mg/dL. A pulmonary function test was completed by a pulmonologist.    IMAGING:  Patient has a Doppler ultrasound of the lower extremities scheduled for the 20th.      ROS:  General: -fever, -chills, -fatigue, -weight gain, -weight loss  Eyes: -vision changes, -redness, -discharge  ENT: -ear pain, -nasal congestion, -sore throat  Cardiovascular: -chest pain, -palpitations, -lower extremity edema  Respiratory: +cough, +shortness of breath, +wheezing  Gastrointestinal: -abdominal pain, -nausea, -vomiting, -diarrhea, -constipation, -blood in stool  Genitourinary: -dysuria, -hematuria, -frequency  Musculoskeletal: +joint pain, -muscle pain, +limb swelling  Skin: -rash, -lesion  Neurological: -headache, -dizziness, -numbness, -tingling  Psychiatric: +anxiety, -depression, -sleep difficulty                Medical / Social / Family History     Past Medical History:   Diagnosis Date    ACC/AHA stage C congestive heart failure due to ischemic cardiomyopathy 03/12/2024    ACE inhibitor intolerance 03/12/2024    Alcohol dependence with uncomplicated withdrawal 07/09/2024    Broken shoulder 11/02/2024    right shoulder    CAD (coronary artery disease) 07/09/2024    Chronic deep vein thrombosis (DVT) of proximal vein of lower  "extremity, unspecified laterality 09/03/2024    CKD stage 3b, GFR 30-44 ml/min 07/11/2024    Diabetes mellitus, type 2     Episode of recurrent major depressive disorder, unspecified depression episode severity 09/03/2024    Factor 5 Leiden mutation, heterozygous     Familial hyperlipidemia 03/12/2024    Plan to (re) initiate crestor  Did not tolerate lipitor         Past Surgical History:   Procedure Laterality Date    REVERSE TOTAL SHOULDER ARTHROPLASTY Right 11/26/2024    Procedure: ARTHROPLASTY, SHOULDER, TOTAL, REVERSE;  Surgeon: Miguel Meyer MD;  Location: [facility];  Service: Orthopedics;  Laterality: Right;       Social History  Ms.  reports that she has been smoking cigarettes. She started smoking about 50 years ago. She has a 24.7 pack-year smoking history. She has never used smokeless tobacco. She reports current alcohol use. She reports that she does not use drugs.    Family History  Ms.'s family history includes Cancer in her mother; Heart failure in her father.    Medications and Allergies     Medications  No outpatient medications have been marked as taking for the 12/12/24 encounter (Office Visit) with Anabell Mendez NP.       Allergies  Review of patient's allergies indicates:  No Known Allergies    Physical Examination   /74 (BP Location: Left arm, Patient Position: Sitting)   Pulse 101   Temp 97.8 °F (36.6 °C)   Resp 20   Ht 5' 5.98" (1.676 m)   Wt 100.2 kg (221 lb)   SpO2 (!) 94%   BMI 35.69 kg/m²    Physical Exam    General: No acute distress. Well-developed. Well-nourished.  Eyes: EOMI. Sclerae anicteric.  HENT: Normocephalic. Atraumatic. Nares patent. Moist oral mucosa.  Cardiovascular: Regular rate. Regular rhythm. No murmurs. No rubs. No gallops. Normal S1, S2.  Respiratory: Normal respiratory effort. Clear to auscultation bilaterally. No rales. No rhonchi. Wheezing.  Musculoskeletal: No  obvious deformity.  Extremities: lower extremity edema.  Neurological: " Alert & oriented x3. No slurred speech. Normal gait.  Psychiatric: Normal mood. Normal affect. Good insight. Good judgment.  Skin: Warm. Dry. No rash.           Assessment and Plan (including Health Maintenance)      Problem List  Smart Sets  Document Outside HM   :    Assessment & Plan    IMPRESSION:  - Assessed respiratory symptoms and fluid retention, considering history of congestive heart failure and recent arm surgery  - Evaluated need for steroid treatment given ongoing breathing difficulties and previous response to steroids  - Considered current diabetes management in context of planned steroid treatment  - Reviewed home SpO2 monitoring and use of nebulizer  - Noted patient's dissatisfaction with previous pulmonologist's assessment    OBESITY (BMI 35.0-39.9) WITH COMORBIDITY:  - Monitored patient's fluid retention and edema in extremities, possibly due to reduced mobility after surgery.  - Advised patient to take Lasix up to 4 times daily as needed.    HEART FAILURE:  - Confirmed diagnosis of congestive heart failure.  - Noted patient's report of fluid accumulation, difficulty breathing and sleeping due to fluid buildup.  - Scheduled patient for a Doppler ultrasound of legs.  - Instructed patient to contact the office if any issues arise with fluid retention.    COPD:  - Confirmed diagnosis of .  - Noted improvement in breathing upon exam.  - Continued albuterol via nebulizer every 4-6 hours as needed for shortness of breath management.  - Discussed importance of monitoring SpO2 levels at home, particularly when feeling dyspneic.  - Advised patient to contact the office if SpO2 drops below 90% or if any breathing issues arise.  - Considered referral to pulmonologist for further evaluation.    ASTHMA:  - Acknowledged previous pulmonologist's diagnosis of asthma.    TREATMENT:  - Planned to administer steroid injection and prescribe Medrol Dosepak.  - Explained potential for elevated blood sugar with steroid  treatment and need for increased monitoring.  - Instructed patient to increase frequency of blood sugar checks while on steroid treatment.    DIABETES:  - Noted patient's home blood sugar monitoring and reports of high levels, with a recorded blood sugar of 300 before lunch.  - Continued patient's use of insulin to manage blood sugar, noting administration of 10 units before lunch.  - Advised patient to monitor blood sugar closely, especially with upcoming steroid treatment.    INFARCTION:  - Noted patient's history of previous myocardial infarction.    ANXIETY:  - Monitored patient's reports of anxiety related to breathing difficulties, including episodes of hyperventilation.    INSTRUCTIONS:  - Noted patient's reports of reduced mobility due to recent arm surgery.              Health Maintenance Due   Topic Date Due    Hepatitis C Screening  Never done    Cervical Cancer Screening  Never done    Diabetes Urine Screening  Never done    Foot Exam  Never done    Eye Exam  Never done    HIV Screening  Never done    TETANUS VACCINE  Never done    Mammogram  Never done    Pneumococcal Vaccines (Age 50+) (1 of 2 - PCV) Never done    Colorectal Cancer Screening  Never done    Shingles Vaccine (1 of 2) Never done    RSV Vaccine (Age 60+ and Pregnant patients) (1 - Risk 60-74 years 1-dose series) Never done    Influenza Vaccine (1) Never done    COVID-19 Vaccine (1 - 2024-25 season) Never done       Problem List Items Addressed This Visit    None  Visit Diagnoses       Congestive heart failure, unspecified HF chronicity, unspecified heart failure type    -  Primary    Chronic obstructive pulmonary disease, unspecified COPD type        Relevant Medications    methylPREDNISolone (MEDROL DOSEPACK) 4 mg tablet    dexAMETHasone injection 8 mg (Completed)    Wheezing        Relevant Medications    methylPREDNISolone (MEDROL DOSEPACK) 4 mg tablet    dexAMETHasone injection 8 mg (Completed)            Health Maintenance Topics with  due status: Not Due       Topic Last Completion Date    LDCT Lung Screen 05/01/2024    Lipid Panel 08/08/2024    High Dose Statin 11/26/2024    Hemoglobin A1c 11/27/2024       Future Appointments   Date Time Provider Department Center   12/30/2024  3:30 PM Amina Saeed FNP [facility]   1/6/2025  3:15 PM Ayse Mendoza, PT [facility]    1/22/2025  2:40 PM Miguel Meyer MD [facility]   2/26/2025  1:40 PM Ramo Diggs MD [facility]   5/1/2025  2:00 PM [facility] CT1 OB [facility]            Signature:  Anabell Mendez NP      [id number]  [place] [address]    Date of encounter: 12/12/24

## 2024-12-30 ENCOUNTER — TELEPHONE (OUTPATIENT)
Dept: CARDIOLOGY | Facility: CLINIC | Age: 59
End: 2024-12-30
Payer: MEDICARE

## 2024-12-30 NOTE — TELEPHONE ENCOUNTER
Called to let patient know I am rescheduling her appt to be with Dr. Diggs for her test results on 1/2/25 at 2:30 instead of today with TEJA Saeed per TEJA Saeed request for patient to see Dr. Diggs so he can explain results.     I am writing patient on her chart as well.  -HW

## 2025-01-08 NOTE — ASSESSMENT & PLAN NOTE
Sec to combination of CHF and COPD exacerbation.   Requiring 2L NC now (weaned down from 4L).   Management as below.  Wean O2 as tolerated, home O2 eval closer to discharge.    wheelchair

## 2025-01-14 NOTE — PROGRESS NOTES
Arthroplasty, Shoulder, Total, Reverse - Right 11/26/2024    Kirk Villar is a 59 y.o. female seen today for post-op visit.  Patient is 2 weeks status post reverse total shoulder arthroplasty by Dr. Meyer.  Patient reports that she is doing well.  No complaints today.      PAST MEDICAL HISTORY:   Past Medical History:   Diagnosis Date    ACC/AHA stage C congestive heart failure due to ischemic cardiomyopathy 03/12/2024    ACE inhibitor intolerance 03/12/2024    Alcohol dependence with uncomplicated withdrawal 07/09/2024    Broken shoulder 11/02/2024    right shoulder    CAD (coronary artery disease) 07/09/2024    Chronic deep vein thrombosis (DVT) of proximal vein of lower extremity, unspecified laterality 09/03/2024    CKD stage 3b, GFR 30-44 ml/min 07/11/2024    Diabetes mellitus, type 2     Episode of recurrent major depressive disorder, unspecified depression episode severity 09/03/2024    Factor 5 Leiden mutation, heterozygous     Familial hyperlipidemia 03/12/2024    Plan to (re) initiate crestor  Did not tolerate lipitor          PAST SURGICAL HISTORY:   Past Surgical History:   Procedure Laterality Date    REVERSE TOTAL SHOULDER ARTHROPLASTY Right 11/26/2024    Procedure: ARTHROPLASTY, SHOULDER, TOTAL, REVERSE;  Surgeon: Miguel Meyer MD;  Location: [facility];  Service: Orthopedics;  Laterality: Right;        MEDICATIONS:   Current Outpatient Medications:     albuterol (VENTOLIN HFA) 90 mcg/actuation inhaler, Inhale 2 puffs into the lungs every 6 (six) hours as needed for Wheezing. Rescue, Disp: 18 g, Rfl: 2    albuterol-ipratropium (DUO-NEB) 2.5 mg-0.5 mg/3 mL nebulizer solution, Take 3 mLs by nebulization every 6 (six) hours as needed for Wheezing. Rescue, Disp: 75 mL, Rfl: 0    blood sugar diagnostic (ACCU-CHEK GUIDE TEST STRIPS) Strp, 1 strip by Misc.(Non-Drug; Combo Route) route 3 (three) times daily., Disp: 100 strip, Rfl: 11    citalopram (CELEXA) 40 MG tablet,  TAKE 1 TABLET ONE TIME DAILY, Disp: 90 tablet, Rfl: 3    clopidogreL (PLAVIX) 75 mg tablet, TAKE 1 TABLET ONE TIME DAILY, Disp: 90 tablet, Rfl: 3    cyclobenzaprine (FLEXERIL) 10 MG tablet, TAKE 1 TABLET TWICE DAILY AS NEEDED FOR MUSCLE SPASM(S), Disp: 60 tablet, Rfl: 11    dapagliflozin propanediol (FARXIGA) 5 mg Tab tablet, Take 1 tablet (5 mg total) by mouth once daily., Disp: 90 tablet, Rfl: 1    docusate sodium (COLACE) 100 MG capsule, Take 2 capsules (200 mg total) by mouth once daily., Disp: 60 capsule, Rfl: 2    ezetimibe (ZETIA) 10 mg tablet, Take 1 tablet (10 mg total) by mouth once daily., Disp: 90 tablet, Rfl: 2    furosemide (LASIX) 40 MG tablet, Take 1 tablet (40 mg total) by mouth once daily., Disp: 90 tablet, Rfl: 3    gabapentin (NEURONTIN) 800 MG tablet, TAKE 1 TABLET FOUR TIMES DAILY, Disp: 120 tablet, Rfl: 11    insulin aspart U-100 (NOVOLOG FLEXPEN U-100 INSULIN) 100 unit/mL (3 mL) InPn pen, Inject 5-10 minutes before meals per sliding scale, Disp: 2 each, Rfl: 2    insulin degludec (TRESIBA FLEXTOUCH U-100) 100 unit/mL (3 mL) insulin pen, Inject 20 Units into the skin every evening., Disp: 6 mL, Rfl: 11    methylPREDNISolone (MEDROL DOSEPACK) 4 mg tablet, use as directed, Disp: 21 tablet, Rfl: 0    metoprolol succinate (TOPROL-XL) 25 MG 24 hr tablet, Take 1 tablet (25 mg total) by mouth 2 (two) times daily., Disp: 180 tablet, Rfl: 2    mupirocin (BACTROBAN) 2 % ointment, Apply topically 2 (two) times daily., Disp: 15 g, Rfl: 2    rivaroxaban (XARELTO) 20 mg Tab, Take 1 tablet (20 mg total) by mouth daily with dinner or evening meal., Disp: 90 tablet, Rfl: 2    rosuvastatin (CRESTOR) 20 MG tablet, Take 1 tablet (20 mg total) by mouth once daily., Disp: 90 tablet, Rfl: 1    sacubitriL-valsartan (ENTRESTO) 24-26 mg per tablet, Take 1 tablet by mouth 2 (two) times daily., Disp: 180 tablet, Rfl: 1    traZODone (DESYREL) 100 MG tablet, TAKE 1 TABLET EVERY EVENING, Disp: 90 tablet, Rfl: 3        PHYSICAL EXAM:      GENERAL: Well-developed, well-nourished female . The patient is alert, oriented and cooperative.    EXTREMITIES:  Right shoulder was skin clean dry and intact, minimal swelling and erythema around incision sites, good range of motion of hand wrist and elbow, neurovascularly intact    WOUND: Incisions are clean,dry,intact without signs of infection and healing well      RADIOGRAPHIC FINDINGS:   EXAMINATION:  XR SHOULDER COMPLETE 2 OR MORE VIEWS RIGHT     CLINICAL HISTORY:  Pain in right shoulder     COMPARISON:  11/26/2024     FINDINGS:  Patient is status post reverse right shoulder arthroplasty stable appearance of metallic arthroplasty hardware compared to prior.  Skin staples overlying the operative site.  No acute fracture.  Opacities at the right lung base.     Impression:     Stable appearance of reverse right shoulder arthroplasty.     No acute osseous abnormality.        Electronically signed by:Edgar Loaiza  Date:                                            12/12/2024  Time:                                           14:20       Patient Active Problem List    Diagnosis Date Noted    COPD exacerbation 11/26/2024    Encounter for cardiac risk counseling 11/22/2024    Claudication, class IV 11/22/2024    Closed fracture of left shoulder 11/22/2024    Chronic deep vein thrombosis (DVT) of proximal vein of lower extremity, unspecified laterality 09/03/2024    Episode of recurrent major depressive disorder, unspecified depression episode severity 09/03/2024    CKD stage 3b, GFR 30-44 ml/min 07/11/2024    Acute on chronic HFrEF (heart failure with reduced ejection fraction) 07/09/2024    Factor 5 Leiden mutation, heterozygous 07/09/2024    Alcohol dependence with uncomplicated withdrawal 07/09/2024    CAD (coronary artery disease) 07/09/2024    Heart failure with mid-range ejection fraction (HFmEF) 03/12/2024    Uncontrolled type 2 diabetes mellitus with hyperglycemia 03/12/2024    ACC/AHA  stage C congestive heart failure due to ischemic cardiomyopathy 03/12/2024    Familial hyperlipidemia 03/12/2024     IMPRESSION AND PLAN: Patient is status-post Arthroplasty, Shoulder, Total, Reverse - Right doing well.  Personally reviewed x-rays today showing reverse total shoulder arthroplasty in place.  All incisional staples removed today and replaced with Steri-Strips, discussed that these can get wet in the shower in 2-3 days, let them fall off on their own.  We will place orders to begin physical therapy with a passive range motion only advancing to active range of motion at 6 weeks postop.  Patient requesting pain medicine refill,  reviewed, [facility] sent to patient's pharmacy.  Follow up with Dr. Meyer in 4 weeks.      No follow-ups on file.       Yina Lopez PA-C      (Subject to voice recognition error, transcription service not allowed)

## 2025-01-15 ENCOUNTER — CLINICAL SUPPORT (OUTPATIENT)
Dept: REHABILITATION | Facility: HOSPITAL | Age: 60
End: 2025-01-15
Payer: MEDICARE

## 2025-01-15 DIAGNOSIS — Z96.611 S/P REVERSE TOTAL SHOULDER ARTHROPLASTY, RIGHT: Primary | ICD-10-CM

## 2025-01-15 DIAGNOSIS — M25.511 ACUTE PAIN OF RIGHT SHOULDER: ICD-10-CM

## 2025-01-15 DIAGNOSIS — M25.611 DECREASED ROM OF RIGHT SHOULDER: ICD-10-CM

## 2025-01-15 DIAGNOSIS — R26.89 DECREASED FUNCTIONAL MOBILITY: ICD-10-CM

## 2025-01-15 PROCEDURE — 97162 PT EVAL MOD COMPLEX 30 MIN: CPT

## 2025-01-15 PROCEDURE — 97110 THERAPEUTIC EXERCISES: CPT

## 2025-01-15 NOTE — PLAN OF CARE
[facility]   Physical Therapy Initial Evaluation      Name: Kirk Villar  Clinic Number: [id number]    Therapy Diagnosis:   Encounter Diagnosis   Name Primary?    S/P reverse total shoulder arthroplasty, right         Physician: Yina Lopez PA    Physician Orders: PT Eval and Treat   Medical Diagnosis from Referral: see above  Evaluation Date: 1/15/2025  Authorization Period Expiration: 1/15/2025 - evaluation only approved   Plan of Care Expiration: 4/11/2025    Date of Surgery: 11/26/2024  Visit # / Visits authorized: 1/ 1 - evaluation only approved   FOTO: 1/ 3    Precautions: Standard - 7 weeks postop    Time In: 4:02 pm  Time Out: 4:51 pm  Total Billable Time: 49 minutes    Subjective     Date of onset: 11/3/2024 - date of fall; 11/26/2024 - date of surgery    History of current condition - Kirk reports: still wearing sling because she states she had been getting mixed instructions on whether she had to keep wearing it - told her to d/c sling today    Falls: fell in her kitchen on 11/3/2024 after getting up in the middle of the night to get some water - fractured humerus with subluxation/dislocation of humeral head    Imaging: multiple xrays of right shoulder/humerus    Prior Therapy: none  Social History:  lives alone  Occupation: on disability due to cardiac issues  Prior Level of Function: independent   Current Level of Function: just started being able to do her dishes, still has difficulty with sweeping and mopping, difficulty sleeping     Pain:  Current 0/10 - does not hurt when she is not moving it, worst 7/10, best 0/10   Location: right shoulder    Description: Aching, Throbbing, Grabbing, Sharp, and Variable  Aggravating Factors: Night Time and trying to move or raise arm  Easing Factors:  muscle relaxers    Patients goals: full use of right upper extremity      Medical History:   Past Medical History:   Diagnosis Date    ACC/AHA stage C congestive heart failure  due to ischemic cardiomyopathy 03/12/2024    ACE inhibitor intolerance 03/12/2024    Alcohol dependence with uncomplicated withdrawal 07/09/2024    Broken shoulder 11/02/2024    right shoulder    CAD (coronary artery disease) 07/09/2024    Chronic deep vein thrombosis (DVT) of proximal vein of lower extremity, unspecified laterality 09/03/2024    CKD stage 3b, GFR 30-44 ml/min 07/11/2024    Diabetes mellitus, type 2     Episode of recurrent major depressive disorder, unspecified depression episode severity 09/03/2024    Factor 5 Leiden mutation, heterozygous     Familial hyperlipidemia 03/12/2024    Plan to (re) initiate crestor  Did not tolerate lipitor         Surgical History:   Kirk Villar  has a past surgical history that includes Reverse total shoulder arthroplasty (Right, 11/26/2024).    Medications:   Kirk has a current medication list which includes the following prescription(s): albuterol, albuterol-ipratropium, blood sugar diagnostic, citalopram, clopidogrel, cyclobenzaprine, dapagliflozin propanediol, docusate sodium, ezetimibe, furosemide, gabapentin, insulin aspart u-100, insulin degludec, methylprednisolone, metoprolol succinate, mupirocin, rivaroxaban, rosuvastatin, entresto, and trazodone.    Allergies:   Review of patient's allergies indicates:  No Known Allergies     Objective      Incisions: healed    Comments: increased tone in upper arm musculature    Posture: rounded shoulders Yes, forward head Yes, increased kyphotic curve Yes, decreased lordotic curve No  Clear cervical spine: Spurling's test positive    Range of motion  Motion Right - PASSIVE  Left    Shoulder flexion 80 degrees  WITHIN FUNCTIONAL LIMITS   Shoulder extension NT WITHIN FUNCTIONAL LIMITS   Shoulder abduction 35 degrees  WITHIN FUNCTIONAL LIMITS   Shoulder internal rotation 40 degrees in supine WITHIN FUNCTIONAL LIMITS   Shoulder external rotation 0 degrees  WITHIN FUNCTIONAL LIMITS   Elbow flexion WITHIN FUNCTIONAL  LIMITS WITHIN FUNCTIONAL LIMITS   Elbow extension WITHIN FUNCTIONAL LIMITS WITHIN FUNCTIONAL LIMITS       MANUAL MUSCLE TEST  Muscle Right  Left    Shoulder flexion Shoulder strength not tested today on right upper extremity  Left upper extremity strength grossly 4/5   Shoulder extension     Shoulder abduction     Shoulder internal rotation     Shoulder external rotation     Elbow flexion     Elbow extension       Functional ability:  Dressing: AMB PT LEVEL OF ASSISTANCE: min A  Driving: AMB PT LEVEL OF ASSISTANCE: independent  Overhead activity: AMB PT LEVEL OF ASSISTANCE: unable to lift right arm  Work/hobbies: AMB PT LEVEL OF ASSISTANCE: does not work - on disability      Clinical test:  Not performed secondary to postop      Palpation: upper arm very firm due to swelling and increased tone in musculature    Intake Outcome Measure for FOTO Shoulder Survey    Therapist reviewed FOTO scores for Kirk Villar on 1/15/2025.   FOTO report - see Media section or FOTO account episode details.    Intake Score: 39       Clinical Information for Insurance Authorization     Dates of Services: 01/15/2025 to 04/11/2025  Discharge Plan: Patient will be discharged from skilled physical therapy treatment once all goals have been met, patient has plateaued, or physician/insurance requests discontinuation of care. Patient will be discharged with a home exercise program.   Type of therapy: Rehabilitative  ICD-10 Diagnosis Code(s): Z96.611   Which side is symptomatic? Right  Surgical: Yes  Surgical procedure:  right reverse total shoulder replacement  Surgery date:  11/26/2024 .  Presenting symptoms/diagnoses are repetitive in nature.  Presenting symptoms are non-radiating in nature.   The rehabilitation is not related to a diagnosis of cancer.  The rehabilitation is not related to a diagnosis of lymphedema.  Patient's clinical presentation is:  Severe objective and functional deficits: consistent intense symptoms with severe  loss of range of motion, strength, or ability to perform daily tasks  CPT Codes Requested:  71806 [therapeutic exercise], 76069 [neuromuscular re-education], 46299 [manual therapy], 99277 [therapeutic activities], 41781 [unattended electrical stimulation], and 19123 [vasopneumatic device]        Treatment     Total Treatment time (time-based codes) separate from Evaluation: 15 minutes     Kirk received the treatments listed below:      Clasped hand flexion, counter (standing)  flexion and table (seated) flexion slides, wall posture holds with cervical retraction, and seated scapular retraction.    Patient Education and Home Exercises     Education provided:   - evaluation results, plan of care and home exercise program     Written Home Exercises Provided: yes. Exercises were reviewed and Kirk was able to demonstrate them prior to the end of the session.  Kirk demonstrated good  understanding of the education provided. See EMR under Patient Instructions for exercises provided during therapy sessions.    Assessment     Kirk is a 59 y.o. female referred to outpatient Physical Therapy with a medical diagnosis of s/p right reverse TSR. Patient presents with postop symptoms of pain, decreased range of motion, decreased strength, decreased functional mobility and ability to perform activiites of daily living. She was still wearing her sling. Her surgery was on 11/26/24 and she has not had any therapy to date. Instructed her to d/c sling and gave her some gentle range of motion and scapular stabilization activities to begin. She lives alone so she has had to figure out how to dress herself and get her shower. She has just been able to start washing a few dishes. Sweeping and mopping are very difficult. She did not drive herself today. Kirk has cardiac issues that have had her on disability for some years.     Patient prognosis is Good.   Patient will benefit from skilled outpatient Physical Therapy to address  the deficits stated above and in the chart below, provide patient /family education, and to maximize patientt's level of independence.     Plan of care discussed with patient: Yes  Patient's spiritual, cultural and educational needs considered and patient is agreeable to the plan of care and goals as stated below:     Anticipated Barriers for therapy: cardiac problems    Medical Necessity is demonstrated by the following  History  Co-morbidities and personal factors that may impact the plan of care [] LOW: no personal factors / co-morbidities  [] MODERATE: 1-2 personal factors / co-morbidities  [x] HIGH: 3+ personal factors / co-morbidities    Moderate / High Support Documentation:   Co-morbidities affecting plan of care:   Past Medical History:   Diagnosis Date    ACC/AHA stage C congestive heart failure due to ischemic cardiomyopathy 03/12/2024    ACE inhibitor intolerance 03/12/2024    Alcohol dependence with uncomplicated withdrawal 07/09/2024    Broken shoulder 11/02/2024    right shoulder    CAD (coronary artery disease) 07/09/2024    Chronic deep vein thrombosis (DVT) of proximal vein of lower extremity, unspecified laterality 09/03/2024    CKD stage 3b, GFR 30-44 ml/min 07/11/2024    Diabetes mellitus, type 2     Episode of recurrent major depressive disorder, unspecified depression episode severity 09/03/2024    Factor 5 Leiden mutation, heterozygous     Familial hyperlipidemia 03/12/2024    Plan to (re) initiate crestor  Did not tolerate lipitor         Personal Factors:   no deficits     Examination  Body Structures and Functions, activity limitations and participation restrictions that may impact the plan of care [] LOW: addressing 1-2 elements  [] MODERATE: 3+ elements  [x] HIGH: 4+ elements (please support below)    Moderate / High Support Documentation: right shoulder pain, decreased range of motion right shoulder, decreased strength right upper extremity, decreased ability to perform activiites of  daily living, sleep disturbance     Clinical Presentation [] LOW: stable  [x] MODERATE: Evolving  [] HIGH: Unstable     Decision Making/ Complexity Score: moderate         Short Term Goals: 6 weeks  Patient will be independent with home exercise program to facilitate carryover between visits.  Patient will have passive range of motion as follows - 120 degrees flexion, 25 degrees external rotation and 45 degrees internal rotation.  Patient will be independent with dressing and driving without modification.  Patient will be able to sleep all night in bed without waking more than once due to right shoulder pain.    Long Term Goals: 12 weeks  Patient will have active range of motion as follow - 120 degrees flexion, 45-50 degrees external rotation and functional internal rotation to L1 for reaching overhead, behind head and behind back for dressing, grooming and hygiene.  Patient will have 4-4+/5 strength right shoulder/upper extremity to perform household chores and work related tasks.  Patient will place 2# dumbbell on head height shelf without pain right shoulder.  Patient will return to performing mopping, sweeping, cooking and laundry to be functionally independent.     Plan     Plan of care Certification: 1/15/2025 to 4/11/2025.    Outpatient Physical Therapy 2 times weekly for 12 weeks to include the following interventions: 93462 [therapeutic exercise], 57597 [neuromuscular re-education], 08263 [manual therapy], 44959 [therapeutic activities], 61213 [ultrasound], 59405 [unattended electrical stimulation], and 96504 [vasopneumatic device].     ELIDIA SOLIS, PT        Physician's Signature: _________________________________________ Date: ________________

## 2025-01-17 DIAGNOSIS — Z96.611 S/P REVERSE TOTAL SHOULDER ARTHROPLASTY, RIGHT: Primary | ICD-10-CM

## 2025-01-21 PROBLEM — Z96.611 S/P REVERSE TOTAL SHOULDER ARTHROPLASTY, RIGHT: Status: ACTIVE | Noted: 2025-01-21

## 2025-01-21 PROBLEM — R26.89 DECREASED FUNCTIONAL MOBILITY: Status: ACTIVE | Noted: 2025-01-21

## 2025-01-21 PROBLEM — M25.611 DECREASED ROM OF RIGHT SHOULDER: Status: ACTIVE | Noted: 2025-01-21

## 2025-01-21 PROBLEM — M25.511 ACUTE PAIN OF RIGHT SHOULDER: Status: ACTIVE | Noted: 2025-01-21

## 2025-01-23 ENCOUNTER — TELEPHONE (OUTPATIENT)
Dept: ORTHOPEDICS | Facility: CLINIC | Age: 60
End: 2025-01-23
Payer: MEDICARE

## 2025-01-29 ENCOUNTER — CLINICAL SUPPORT (OUTPATIENT)
Dept: REHABILITATION | Facility: HOSPITAL | Age: 60
End: 2025-01-29
Payer: MEDICARE

## 2025-01-29 DIAGNOSIS — R26.89 DECREASED FUNCTIONAL MOBILITY: ICD-10-CM

## 2025-01-29 DIAGNOSIS — M25.511 ACUTE PAIN OF RIGHT SHOULDER: ICD-10-CM

## 2025-01-29 DIAGNOSIS — Z96.611 S/P REVERSE TOTAL SHOULDER ARTHROPLASTY, RIGHT: Primary | ICD-10-CM

## 2025-01-29 DIAGNOSIS — M25.611 DECREASED ROM OF RIGHT SHOULDER: ICD-10-CM

## 2025-01-29 PROCEDURE — 97110 THERAPEUTIC EXERCISES: CPT

## 2025-01-29 PROCEDURE — 97530 THERAPEUTIC ACTIVITIES: CPT

## 2025-01-29 PROCEDURE — 97140 MANUAL THERAPY 1/> REGIONS: CPT

## 2025-01-29 NOTE — PROGRESS NOTES
[facility]   Physical Therapy Treatment Note      Name: Kirk Villar  Clinic Number: [id number]    Therapy Diagnosis:   Encounter Diagnoses   Name Primary?    S/P reverse total shoulder arthroplasty, right Yes    Decreased ROM of right shoulder     Acute pain of right shoulder     Decreased functional mobility      Physician: Yina Lopez PA    Visit Date: 1/29/2025    Physician Orders: PT Eval and Treat   Medical Diagnosis from Referral: see above  Evaluation Date: 1/15/2025  Authorization Period Expiration: 3/29/2025   Plan of Care Expiration: 4/11/2025     Date of Surgery: 11/26/2024  Visit # / Visits authorized: 2/25    FOTO: 1/ 3     Precautions: Standard - 7 weeks postop    PTA Visit #: 0/5     Time In: 4:47 pm  Time Out: 5:36 pm  Total Billable Time: 44 billable minutes    Subjective     Pt reports: shoulder is still hurting a good bit  She was compliant with home exercise program.  Response to previous treatment: no complaints   Functional change: no change noted    Pain: 6/10  Location: right shoulder and upper arm    Objective      Active range of motion right shoulder: 30 degrees flexion, external rotation 20 degrees in adduction, internal rotation to ischial tuberosity     Treatment     Kirk received the treatments listed below:      therapeutic exercises to develop strength, endurance, ROM, flexibility, posture, and core stabilization for 10 minutes including:  Upper body ergometer x 5 minutes - reverse to activate scapular stabilizers  Pulleys x 5 minutes    manual therapy techniques: Joint mobilizations, Manual traction, Myofacial release, Soft tissue Mobilization, and Friction Massage were applied for 20 minutes, including:  Passive stretching into flexion, external rotation and internal rotation in supine (head elevated)  MFR to right upper arm musculature    neuromuscular re-education activities to improve: Balance, Coordination, Kinesthetic Sense,  Proprioception, and Posture for 4 minutes. The following activities were included:  Scapular retraction/extension - red 2 x 13    therapeutic activities to improve functional performance for 10  minutes, including:  Cane flexion up rail 2 x 20  Rows 1 plate 3 x 10      direct contact modalities after being cleared for contraindications:     supervised modalities after being cleared for contradictions:       Patient Education and Home Exercises       Education provided:   - review of home exercise program and current Plan of Care/rationale of treatment.    Written Home Exercises Provided: Patient instructed to cont prior HEP. Exercises were reviewed and Kirk was able to demonstrate them prior to the end of the session.  Kirk demonstrated good understanding of the education provided. See EMR under Patient Instructions for exercises provided during therapy sessions    Assessment     At Evaluation:  Krik is a 59 y.o. female referred to outpatient Physical Therapy with a medical diagnosis of s/p right reverse TSR. Patient presents with postop symptoms of pain, decreased range of motion, decreased strength, decreased functional mobility and ability to perform activiites of daily living. She was still wearing her sling. Her surgery was on 11/26/24 and she has not had any therapy to date. Instructed her to d/c sling and gave her some gentle range of motion and scapular stabilization activities to begin. She lives alone so she has had to figure out how to dress herself and get her shower. She has just been able to start washing a few dishes. Sweeping and mopping are very difficult. She did not drive herself today. Kirk has cardiac issues that have had her on disability for some years.     Current Assessment:  Kirk arrived for her first visit following evaluation. Her activity tolerance is poor due to other comorbidities - mainly cardiac issues. She is very late getting started with physical therapy for her  shoulder. Her surgery was on 11/26/24. She was able to complete the above listed activities with minimal complaints. Spent a lot of time manually stretching the shoulder to improve her range of motion. She has to be propped up as she can't lay flat due to getting SOB. Will continue current plan of care working on range of motion, strength and scapular stability.    Kirk Is progressing towards her goals.   Pt prognosis is Good.     Pt will continue to benefit from skilled outpatient physical therapy to address the deficits listed in the problem list box on initial evaluation, provide pt/family education and to maximize pt's level of independence in the home and community environment.     Pt's spiritual, cultural and educational needs considered and pt agreeable to plan of care and goals.     Anticipated barriers to physical therapy: none    Short Term Goals: 6 weeks  Patient will be independent with home exercise program to facilitate carryover between visits.  Patient will have passive range of motion as follows - 120 degrees flexion, 25 degrees external rotation and 45 degrees internal rotation.  Patient will be independent with dressing and driving without modification.  Patient will be able to sleep all night in bed without waking more than once due to right shoulder pain.     Long Term Goals: 12 weeks  Patient will have active range of motion as follow - 120 degrees flexion, 45-50 degrees external rotation and functional internal rotation to L1 for reaching overhead, behind head and behind back for dressing, grooming and hygiene.  Patient will have 4-4+/5 strength right shoulder/upper extremity to perform household chores and work related tasks.  Patient will place 2# dumbbell on head height shelf without pain right shoulder.  Patient will return to performing mopping, sweeping, cooking and laundry to be functionally independent.     Plan     Plan of care Certification: 1/15/2025 to 4/11/2025.     Outpatient  Physical Therapy 2 times weekly for 12 weeks to include the following interventions: 80192 [therapeutic exercise], 19752 [neuromuscular re-education], 33713 [manual therapy], 49735 [therapeutic activities], 06795 [ultrasound], 39364 [unattended electrical stimulation], and 03399 [vasopneumatic device].        ELIDIA SOLIS, PT   01/29/2025

## 2025-01-30 ENCOUNTER — CLINICAL SUPPORT (OUTPATIENT)
Dept: REHABILITATION | Facility: HOSPITAL | Age: 60
End: 2025-01-30
Payer: MEDICARE

## 2025-01-30 DIAGNOSIS — M25.611 DECREASED ROM OF RIGHT SHOULDER: Primary | ICD-10-CM

## 2025-01-30 PROCEDURE — 97140 MANUAL THERAPY 1/> REGIONS: CPT | Mod: CQ

## 2025-01-30 PROCEDURE — 97112 NEUROMUSCULAR REEDUCATION: CPT | Mod: CQ

## 2025-01-30 PROCEDURE — 97110 THERAPEUTIC EXERCISES: CPT | Mod: CQ

## 2025-01-30 NOTE — PROGRESS NOTES
[facility]   Physical Therapy Treatment Note      Name: Kirk Villar  Clinic Number: [id number]    Therapy Diagnosis:   No diagnosis found.    Physician: Yina Lopez PA    Visit Date: 1/30/2025  Physician Orders: PT Eval and Treat   Medical Diagnosis from Referral: see above  Evaluation Date: 1/15/2025  Authorization Period Expiration: 1/15/2025 - evaluation only approved   Plan of Care Expiration: 4/11/2025     Date of Surgery: 11/26/2024  Visit # / Visits authorized: 2/ 8 - evaluation only approved   FOTO: 1/ 3     Precautions: Standard - 7 weeks postop      PTA Visit #: 1/5     Time In: 4:55 pm  Time Out: 5:38 pm  Total Billable Time: 43 minutes    Subjective     Pt reports: shoulder feels better than yesterday, PT helps, ordered pulleys  She was compliant with home exercise program.  Response to previous treatment: no complaints   Functional change: no change noted    Pain: 1-2/10 without meds  Location: right shoulder and upper arm    Objective      Active range of motion right shoulder: 30 degrees flexion, external rotation 20 degrees in adduction, internal rotation to ischial tuberosity     Treatment     Kirk received the treatments listed below:      therapeutic exercises to develop strength, endurance, ROM, flexibility, posture, and core stabilization for 10 minutes including:  Upper body ergometer x 5 minutes - reverse to activate scapular stabilizers  Pulleys x 5 minutes    manual therapy techniques: Joint mobilizations, Manual traction, Myofacial release, Soft tissue Mobilization, and Friction Massage were applied for 18 minutes, including:  Passive stretching into flexion, external rotation and internal rotation in supine (head elevated)  MFR to right upper arm musculature    neuromuscular re-education activities to improve: Balance, Coordination, Kinesthetic Sense, Proprioception, and Posture for 10 minutes. The following activities were included:  Scapular  retraction/extension - red 30 x  Scap pnf x 20  Adduction squeeze with wrist extension x 10 with 3 sec hold    therapeutic activities to improve functional performance for 10  minutes, including:  Cane flexion up rail 2 x 20  Rows 1 plate 3 x 10      direct contact modalities after being cleared for contraindications:     supervised modalities after being cleared for contradictions:       Patient Education and Home Exercises       Education provided:   - review of home exercise program and current Plan of Care/rationale of treatment.    Written Home Exercises Provided: Patient instructed to cont prior HEP. Exercises were reviewed and Kirk was able to demonstrate them prior to the end of the session.  Kirk demonstrated good understanding of the education provided. See EMR under Patient Instructions for exercises provided during therapy sessions    Assessment     At Evaluation:  Kirk is a 59 y.o. female referred to outpatient Physical Therapy with a medical diagnosis of s/p right reverse TSR. Patient presents with postop symptoms of pain, decreased range of motion, decreased strength, decreased functional mobility and ability to perform activiites of daily living. She was still wearing her sling. Her surgery was on 11/26/24 and she has not had any therapy to date. Instructed her to d/c sling and gave her some gentle range of motion and scapular stabilization activities to begin. She lives alone so she has had to figure out how to dress herself and get her shower. She has just been able to start washing a few dishes. Sweeping and mopping are very difficult. She did not drive herself today. Kirk has cardiac issues that have had her on disability for some years.     Current Assessment:  Case conference with Ayse Mendoza PT for initial PTA visit. Kirk is still has hardness in shoulder. She allows AAROM fairly well. Demonstrates good flexion ROM with pulleys.    Kirk Is progressing towards her goals.    Pt prognosis is Good.     Pt will continue to benefit from skilled outpatient physical therapy to address the deficits listed in the problem list box on initial evaluation, provide pt/family education and to maximize pt's level of independence in the home and community environment.     Pt's spiritual, cultural and educational needs considered and pt agreeable to plan of care and goals.     Anticipated barriers to physical therapy: none    Goals: Short Term Goals: 6 weeks  Patient will be independent with home exercise program to facilitate carryover between visits.  Patient will have passive range of motion as follows - 120 degrees flexion, 25 degrees external rotation and 45 degrees internal rotation.  Patient will be independent with dressing and driving without modification.  Patient will be able to sleep all night in bed without waking more than once due to right shoulder pain.     Long Term Goals: 12 weeks  Patient will have active range of motion as follow - 120 degrees flexion, 45-50 degrees external rotation and functional internal rotation to L1 for reaching overhead, behind head and behind back for dressing, grooming and hygiene.  Patient will have 4-4+/5 strength right shoulder/upper extremity to perform household chores and work related tasks.  Patient will place 2# dumbbell on head height shelf without pain right shoulder.  Patient will return to performing mopping, sweeping, cooking and laundry to be functionally independent.     Plan     Plan of care Certification: 1/15/2025 to 4/11/2025.     Outpatient Physical Therapy 2 times weekly for 12 weeks to include the following interventions: 98845 [therapeutic exercise], 93214 [neuromuscular re-education], 59813 [manual therapy], 03295 [therapeutic activities], 12545 [ultrasound], 18394 [unattended electrical stimulation], and 09652 [vasopneumatic device].     Susan Reed, SHANTI   01/30/2025

## 2025-02-03 ENCOUNTER — OFFICE VISIT (OUTPATIENT)
Dept: CARDIOLOGY | Facility: CLINIC | Age: 60
End: 2025-02-03
Payer: MEDICARE

## 2025-02-03 ENCOUNTER — CLINICAL SUPPORT (OUTPATIENT)
Dept: REHABILITATION | Facility: HOSPITAL | Age: 60
End: 2025-02-03
Payer: MEDICARE

## 2025-02-03 VITALS
OXYGEN SATURATION: 93 % | DIASTOLIC BLOOD PRESSURE: 72 MMHG | HEART RATE: 86 BPM | BODY MASS INDEX: 33.07 KG/M2 | WEIGHT: 204.81 LBS | SYSTOLIC BLOOD PRESSURE: 132 MMHG

## 2025-02-03 DIAGNOSIS — M25.611 DECREASED ROM OF RIGHT SHOULDER: ICD-10-CM

## 2025-02-03 DIAGNOSIS — Z96.611 S/P REVERSE TOTAL SHOULDER ARTHROPLASTY, RIGHT: Primary | ICD-10-CM

## 2025-02-03 DIAGNOSIS — Z01.812 PRE-OPERATIVE LABORATORY EXAMINATION: ICD-10-CM

## 2025-02-03 DIAGNOSIS — I73.9 CLAUDICATION, CLASS IV: ICD-10-CM

## 2025-02-03 DIAGNOSIS — M25.511 ACUTE PAIN OF RIGHT SHOULDER: ICD-10-CM

## 2025-02-03 DIAGNOSIS — I20.9 ANGINA, CLASS III: ICD-10-CM

## 2025-02-03 DIAGNOSIS — I50.23 ACUTE ON CHRONIC HFREF (HEART FAILURE WITH REDUCED EJECTION FRACTION): Primary | ICD-10-CM

## 2025-02-03 DIAGNOSIS — R26.89 DECREASED FUNCTIONAL MOBILITY: ICD-10-CM

## 2025-02-03 PROBLEM — Z71.89 ENCOUNTER FOR CARDIAC RISK COUNSELING: Status: RESOLVED | Noted: 2024-11-22 | Resolved: 2025-02-03

## 2025-02-03 PROCEDURE — 99999 PR PBB SHADOW E&M-EST. PATIENT-LVL IV: CPT | Mod: PBBFAC,,, | Performed by: HOSPITALIST

## 2025-02-03 PROCEDURE — 99214 OFFICE O/P EST MOD 30 MIN: CPT | Mod: PBBFAC | Performed by: HOSPITALIST

## 2025-02-03 PROCEDURE — 3075F SYST BP GE 130 - 139MM HG: CPT | Mod: CPTII,,, | Performed by: HOSPITALIST

## 2025-02-03 PROCEDURE — 1159F MED LIST DOCD IN RCRD: CPT | Mod: CPTII,,, | Performed by: HOSPITALIST

## 2025-02-03 PROCEDURE — 99215 OFFICE O/P EST HI 40 MIN: CPT | Mod: S$PBB,,, | Performed by: HOSPITALIST

## 2025-02-03 PROCEDURE — 3078F DIAST BP <80 MM HG: CPT | Mod: CPTII,,, | Performed by: HOSPITALIST

## 2025-02-03 PROCEDURE — 3008F BODY MASS INDEX DOCD: CPT | Mod: CPTII,,, | Performed by: HOSPITALIST

## 2025-02-03 PROCEDURE — 97140 MANUAL THERAPY 1/> REGIONS: CPT | Mod: CQ

## 2025-02-03 PROCEDURE — 97110 THERAPEUTIC EXERCISES: CPT | Mod: CQ

## 2025-02-03 NOTE — H&P (VIEW-ONLY)
CARDIOVASCULAR CONSULTATION        REASON FOR CONSULT:   Kirk Villar is a 59 y.o. female who presents for   Chief Complaint   Patient presents with    Follow-up     Discuss claudication per results . Wants to discuss doing cardiac rehab after rehab for shoulder    Shortness of Breath     States fluid in lungs. Wants to discuss with Dr. Diggs (4th time in 1 year)    Edema     Bilateral leg. Better with elevation. States sometimes in arms/hands    Pain     Restless leg syndrome causing loss of sleep    Dizziness     Off balance constantly. With standing and mostly with ambulation    Bleeding/Bruising     Bruises easily. Bumping into objects causes bruising.     Fatigue     All the time.           HISTORY OF PRESENT ILLNESS:   59 y.o. female who  has a past medical history of ACC/AHA stage C congestive heart failure due to ischemic cardiomyopathy, ACE inhibitor intolerance, Alcohol dependence with uncomplicated withdrawal, Broken shoulder, CAD (coronary artery disease), Chronic deep vein thrombosis (DVT) of proximal vein of lower extremity, unspecified laterality, CKD stage 3b, GFR 30-44 ml/min, Diabetes mellitus, type 2, Episode of recurrent major depressive disorder, unspecified depression episode severity, Factor 5 Leiden mutation, heterozygous, and Familial hyperlipidemia.        Today we discussed the patient's cardiac symptoms at length specifically her lower extremity edema which has recurred since her last visit. She has a history of HF w/ reduced (mid-range) ejection fraction - see last echo below).     Today she complains of dyspnea on exertion w/ minimal activity that occurs daily, is severe and causes her to have to stop her activity. This is relatively new for the patient and she was doing well until recently. We discussed proceeding w/ LHC and cor angio given her progressive angina/anginal equivalent that has quite typical features and is worsening in both frequency and severity. This in context of  prior known obstructive CAD s/p PCI w/ SAM x3, complicatd by ISR of all 3 stents due to DAPT failure (patient was not placed on DAPT post PCI per her account) - subsequently 2 stents were successfully recanalized, however per the  at the time, the third stent was very difficult and he was unable to fix the 90% in-stent thrombosis.      She also is complaining severe bilateral claudication - has hx of PAD as well w/ known stents (x4, 2 in each leg per patient) - her most recent arterial doppler showed normal ABIs - we discussed cardiac ischemic evaluation w/ plan to perform CTA w/ runoff bilaterally vs peripheral angio w/ runoff given prior stents if claudication persists (at follow-up visit).     She also c/o of mild HF exacerbation today w/ moderate BLE, mildly worse sob at rest over her baseline severe HEAD. See a/p.    Results for orders placed during the hospital encounter of 02/26/24    Echo    Interpretation Summary    Left Ventricle: The left ventricle is normal in size. Mildly increased wall thickness. There is concentric hypertrophy. Regional wall motion abnormalities present. There is mildly reduced systolic function with a visually estimated ejection fraction of 40 - 45%. There is diastolic dysfunction.    Right Ventricle: Normal right ventricular cavity size. Systolic function is normal.    Left Atrium: Left atrium is moderately dilated.    Aortic Valve: The aortic valve is a trileaflet valve.    IVC/SVC: Normal venous pressure at 3 mmHg.          PAST MEDICAL HISTORY:     Past Medical History:   Diagnosis Date    ACC/AHA stage C congestive heart failure due to ischemic cardiomyopathy 03/12/2024    ACE inhibitor intolerance 03/12/2024    Alcohol dependence with uncomplicated withdrawal 07/09/2024    Broken shoulder 11/02/2024    right shoulder    CAD (coronary artery disease) 07/09/2024    Chronic deep vein thrombosis (DVT) of proximal vein of lower extremity, unspecified laterality 09/03/2024     CKD stage 3b, GFR 30-44 ml/min 07/11/2024    Diabetes mellitus, type 2     Episode of recurrent major depressive disorder, unspecified depression episode severity 09/03/2024    Factor 5 Leiden mutation, heterozygous     Familial hyperlipidemia 03/12/2024    Plan to (re) initiate crestor  Did not tolerate lipitor         PAST SURGICAL HISTORY:     Past Surgical History:   Procedure Laterality Date    REVERSE TOTAL SHOULDER ARTHROPLASTY Right 11/26/2024    Procedure: ARTHROPLASTY, SHOULDER, TOTAL, REVERSE;  Surgeon: Miguel Meyer MD;  Location: [facility];  Service: Orthopedics;  Laterality: Right;       ALLERGIES AND MEDICATION:   Review of patient's allergies indicates:  No Known Allergies     Medication List            Accurate as of February 3, 2025  4:33 PM. If you have any questions, ask your nurse or doctor.                CONTINUE taking these medications      albuterol 90 mcg/actuation inhaler  Commonly known as: VENTOLIN HFA  Inhale 2 puffs into the lungs every 6 (six) hours as needed for Wheezing. Rescue     albuterol-ipratropium 2.5 mg-0.5 mg/3 mL nebulizer solution  Commonly known as: DUO-NEB  Take 3 mLs by nebulization every 6 (six) hours as needed for Wheezing. Rescue     blood sugar diagnostic Strp  Commonly known as: ACCU-CHEK GUIDE TEST STRIPS  1 strip by Misc.(Non-Drug; Combo Route) route 3 (three) times daily.     citalopram 40 MG tablet  Commonly known as: CeleXA  TAKE 1 TABLET ONE TIME DAILY     clopidogreL 75 mg tablet  Commonly known as: PLAVIX  TAKE 1 TABLET ONE TIME DAILY     cyclobenzaprine 10 MG tablet  Commonly known as: FLEXERIL  TAKE 1 TABLET TWICE DAILY AS NEEDED FOR MUSCLE SPASM(S)     dapagliflozin propanediol 5 mg Tab tablet  Commonly known as: FARXIGA  Take 1 tablet (5 mg total) by mouth once daily.     docusate sodium 100 MG capsule  Commonly known as: COLACE  Take 2 capsules (200 mg total) by mouth once daily.     ENTRESTO 24-26 mg per tablet  Generic drug:  sacubitriL-valsartan  Take 1 tablet by mouth 2 (two) times daily.     ezetimibe 10 mg tablet  Commonly known as: ZETIA  Take 1 tablet (10 mg total) by mouth once daily.     furosemide 40 MG tablet  Commonly known as: LASIX  Take 1 tablet (40 mg total) by mouth once daily.     gabapentin 800 MG tablet  Commonly known as: NEURONTIN  TAKE 1 TABLET FOUR TIMES DAILY     insulin aspart U-100 100 unit/mL (3 mL) Inpn pen  Commonly known as: NovoLOG Flexpen U-100 Insulin  Inject 5-10 minutes before meals per sliding scale     insulin degludec 100 unit/mL (3 mL) insulin pen  Commonly known as: TRESIBA FLEXTOUCH U-100  Inject 20 Units into the skin every evening.     methylPREDNISolone 4 mg tablet  Commonly known as: MEDROL DOSEPACK  use as directed     metoprolol succinate 25 MG 24 hr tablet  Commonly known as: TOPROL-XL  Take 1 tablet (25 mg total) by mouth 2 (two) times daily.     mupirocin 2 % ointment  Commonly known as: BACTROBAN  Apply topically 2 (two) times daily.     rivaroxaban 20 mg Tab  Commonly known as: XARELTO  Take 1 tablet (20 mg total) by mouth daily with dinner or evening meal.     rosuvastatin 20 MG tablet  Commonly known as: CRESTOR  Take 1 tablet (20 mg total) by mouth once daily.     traZODone 100 MG tablet  Commonly known as: DESYREL  TAKE 1 TABLET EVERY EVENING              SOCIAL HISTORY:     Social History     Socioeconomic History    Marital status: Unknown   Tobacco Use    Smoking status: Every Day     Current packs/day: 0.25     Average packs/day: 0.5 packs/day for 50.1 years (24.8 ttl pk-yrs)     Types: Cigarettes     Start date: 1975    Smokeless tobacco: Never    Tobacco comments:     Patient states 4 singles a day   Substance and Sexual Activity    Alcohol use: Not Currently     Comment: occasionally    Drug use: Never    Sexual activity: Not Currently     Social Drivers of Health     Financial Resource Strain: Medium Risk (8/7/2024)    Overall Financial Resource Strain (CARDIA)      Difficulty of Paying Living Expenses: Somewhat hard   Food Insecurity: Food Insecurity Present (8/7/2024)    Hunger Vital Sign     Worried About Running Out of Food in the Last Year: Sometimes true     Ran Out of Food in the Last Year: Sometimes true   Transportation Needs: No Transportation Needs (7/9/2024)    TRANSPORTATION NEEDS     Transportation : No   Physical Activity: Inactive (8/7/2024)    Exercise Vital Sign     Days of Exercise per Week: 0 days     Minutes of Exercise per Session: 10 min   Stress: Stress Concern Present (8/7/2024)    [employer] - Occupational Stress Questionnaire     Feeling of Stress : Very much   Housing Stability: High Risk (8/7/2024)    Housing Stability Vital Sign     Unable to Pay for Housing in the Last Year: Yes     Homeless in the Last Year: No       FAMILY HISTORY:     Family History   Problem Relation Name Age of Onset    Cancer Mother      Heart failure Father         REVIEW OF SYSTEMS:       Cardiology focused 12-point ROS otherwise unremarkable except for as mentioned in HPI and A/P.   Pertinent Positives and Negatives documented herein.       PHYSICAL EXAM:     Vitals:    02/03/25 1505   BP: 132/72   Pulse: 86    Body mass index is 33.07 kg/m².  Weight: 92.9 kg (204 lb 12.8 oz)     /72 (BP Location: Left arm, Patient Position: Sitting)   Pulse 86   Wt 92.9 kg (204 lb 12.8 oz)   SpO2 (!) 93% Comment: fluid on lungs per patient  BMI 33.07 kg/m²       Gen: NAD  HEENT: NC/AT, MMM  Chest: normal wob  CV: RRR, no m/g/r  Ext: mild/trace edema of BLEs  Skin: no rash  Psych: AMAA  Neuro: [facility]NGI      DATA:     Laboratory:  CBC:  Recent Labs   Lab 11/22/24  1031 11/26/24  1302 11/27/24  0501   WBC 7.72 7.97 8.82   Hemoglobin 11.3 L 10.0 L 8.9 L   Hematocrit 37.4 L 33.2 L 29.7 L   Platelet Count 366 297 275       CHEMISTRIES:  Recent Labs   Lab 07/10/24  0359 07/11/24  0454 11/22/24  1031 11/26/24  1302 11/27/24  0501   Glucose 397 H   < > 210 H  "253 H 324 H   Sodium 135 L   < > 139 141 140   Potassium 4.5   < > 3.7 3.8 4.3   BUN 28 H   < > 12 13 16   Creatinine 1.45 H   < > 0.99 1.05 H 1.27 H   Calcium 8.9   < > 9.0 8.6 8.4   Magnesium 2.0  --   --   --   --     < > = values in this interval not displayed.       CARDIAC BIOMARKERS:      No results found for: "BNP"    COAGS:  Recent Labs   Lab 11/22/24  1031   INR 1.16       LIPIDS/LFTS:  Recent Labs   Lab 11/30/22  1400 07/08/24  2040 07/12/24  0506 08/08/24  1130 11/27/24  0501   Cholesterol 348 H  --   --  192  --    Triglycerides 449 H  --   --  201 H  --    HDL Cholesterol 45  --   --  61 H  --    LDL Calculated  --   --   --  91  --    Non-  --   --  131  --    AST 8 L   < > 10 L 19 12   ALT 20   < > 17 19 <7    < > = values in this interval not displayed.       Hemoglobin A1C   Date Value Ref Range Status   11/27/2024 8.5 (H) <=7.0 % Final     Comment:       Normal:               <5.7%  Pre-Diabetic:       5.7% to 6.4%  Diabetic:             >6.4%  Diabetic Goal:     <7%   07/09/2024 10.0 (H) 4.5 - 6.6 % Final     Comment:       Normal:               <5.7%  Pre-Diabetic:       5.7% to 6.4%  Diabetic:             >6.4%  Diabetic Goal:     <7%   11/30/2022 11.6 (H) 4.5 - 6.6 % Final     Comment:       Normal:               <5.7%  Pre-Diabetic:       5.7% to 6.4%  Diabetic:             >6.4%  Diabetic Goal:     <7%       TSH  Recent Labs   Lab 11/30/22  1400 08/08/24  1130   TSH 0.361 1.300       The 10-year ASCVD risk score (Laura WAGNER, et al., 2019) is: 17.8%    Values used to calculate the score:      Age: 59 years      Sex: Female      Is Non- : No      Diabetic: Yes      Tobacco smoker: Yes      Systolic Blood Pressure: 132 mmHg      Is BP treated: Yes      HDL Cholesterol: 61 mg/dL      Total Cholesterol: 192 mg/dL     IMAGING  No orders to display       ASSESSMENT AND PLAN     Patient Active Problem List   Diagnosis    Heart failure with mid-range ejection fraction " (HFmEF)    Uncontrolled type 2 diabetes mellitus with hyperglycemia    ACC/AHA stage C congestive heart failure due to ischemic cardiomyopathy    Familial hyperlipidemia    Acute on chronic HFrEF (heart failure with reduced ejection fraction)    Factor 5 Leiden mutation, heterozygous    Alcohol dependence with uncomplicated withdrawal    CAD (coronary artery disease)    CKD stage 3b, GFR 30-44 ml/min    Chronic deep vein thrombosis (DVT) of proximal vein of lower extremity, unspecified laterality    Episode of recurrent major depressive disorder, unspecified depression episode severity    Claudication, class IV    Closed fracture of left shoulder    COPD exacerbation    S/P reverse total shoulder arthroplasty, right    Decreased ROM of right shoulder    Acute pain of right shoulder    Decreased functional mobility    Angina, class III       Problem Noted   Angina, Class III 2/3/2025    Is now having daily severe HEAD that is new/worse since last visit - both wrt frequency and severity; patient is now having symptoms even w/ minimal exertion or at rest. Has 3 coronary stents from prior - 2 patent, 1 thought to be severely stenosed (90% per patient).   -discussed lhc and cor angio for further ischemic eval - patient amenable    Risks, benefits and alternatives of the catheterization procedure were discussed with the patient.The risks of coronary angiography include but are not limited to: bleeding, infection, death, heart attack, arrhythmia, kidney injury or failure, potential need for dialysis, allergic reactions, stroke, need for emergency surgery, hematoma, pseudoaneurysm etc.  Should stenting be indicated, the patient has agreed to dual anti-platelet therapy for 1-consecutive year with a drug-eluting stent and a minimum of 1-month with the use of a bare metal stent. Additionally, pt is aware that non-compliance is likely to result in stent clotting with heart attack, heart failure, and/or death  The risks of  moderate sedation include hypotension, respiratory depression, arrhythmias, bronchospasm, and death. Informed consent was obtained and the  patient is agreeable to proceed with the procedure. Consent was placed on the chart.       Claudication, Class IV 11/22/2024    -still c/o of severe bilateral claudication; has 4 peripheral stents per her own recollection;   -will plan on future CTA w/ BLE runoff given persistent symptoms and grossly normal arterial US of suprapopliteal vessel bilaterally; - to do post LHC and cor angio if symptoms persist     Heart Failure With Mid-Range Ejection Fraction (Hfmef) 3/12/2024    Partially recovered EF; ischemic cardiomyopathy; 25%->45% following ACS and PCI in [facility]  Plan: initiate/titrate GDMT      Uncontrolled Type 2 Diabetes Mellitus With Hyperglycemia 3/12/2024   Acc/Aha Stage C Congestive Heart Failure Due to Ischemic Cardiomyopathy 3/12/2024   Familial Hyperlipidemia 3/12/2024    Plan to (re) initiate crestor  Did not tolerate lipitor     Encounter for Cardiac Risk Counseling (Resolved) 11/22/2024    RUE injury, planned TSR Tues. Per patient. No cp or anginal complaints w/ ADLs/iADLs; we discussed given her surgery date is next Tuesday, somewhat limited (in terms of sensitivity), though her CVRI suggests she is low or intermediate risk, depending on her pre-op Hgb level (pending). Either way, feel is reasonable to proceed from risk:benefit standpoint, as there is significant mortality/morbidity/QOL etc if not repaired-  patient understands; agrees she would like to proceed, feel is reasonable, ultimately defer to  choice - happy to discuss if concern    0  >75   1  Cv dz hx  0  angina w ADLs/iADLs  0  vasc surg   0  emergent surg  ?  Hgb <12       0-1 pt: low  2-3 pt: intermed  >3 pt: high     CVRI, JACC 2019, validated, powered for discriminatory fx (30d mortality); superior c-statistic to NSQIP, RCRI, and all risk models prior to 2019         No orders  of the defined types were placed in this encounter.      1. Claudication, class IV    2. Angina, class III                This note was dictated with the help of speech recognition software.  There might be un-intended errors and/or substitutions.

## 2025-02-03 NOTE — PROGRESS NOTES
CARDIOVASCULAR CONSULTATION        REASON FOR CONSULT:   Kirk Villar is a 59 y.o. female who presents for   Chief Complaint   Patient presents with    Follow-up     Discuss claudication per results . Wants to discuss doing cardiac rehab after rehab for shoulder    Shortness of Breath     States fluid in lungs. Wants to discuss with Dr. Diggs (4th time in 1 year)    Edema     Bilateral leg. Better with elevation. States sometimes in arms/hands    Pain     Restless leg syndrome causing loss of sleep    Dizziness     Off balance constantly. With standing and mostly with ambulation    Bleeding/Bruising     Bruises easily. Bumping into objects causes bruising.     Fatigue     All the time.           HISTORY OF PRESENT ILLNESS:   59 y.o. female who  has a past medical history of ACC/AHA stage C congestive heart failure due to ischemic cardiomyopathy, ACE inhibitor intolerance, Alcohol dependence with uncomplicated withdrawal, Broken shoulder, CAD (coronary artery disease), Chronic deep vein thrombosis (DVT) of proximal vein of lower extremity, unspecified laterality, CKD stage 3b, GFR 30-44 ml/min, Diabetes mellitus, type 2, Episode of recurrent major depressive disorder, unspecified depression episode severity, Factor 5 Leiden mutation, heterozygous, and Familial hyperlipidemia.        Today we discussed the patient's cardiac symptoms at length specifically her lower extremity edema which has recurred since her last visit. She has a history of HF w/ reduced (mid-range) ejection fraction - see last echo below).     Today she complains of dyspnea on exertion w/ minimal activity that occurs daily, is severe and causes her to have to stop her activity. This is relatively new for the patient and she was doing well until recently. We discussed proceeding w/ LHC and cor angio given her progressive angina/anginal equivalent that has quite typical features and is worsening in both frequency and severity. This in context of  prior known obstructive CAD s/p PCI w/ SAM x3, complicatd by ISR of all 3 stents due to DAPT failure (patient was not placed on DAPT post PCI per her account) - subsequently 2 stents were successfully recanalized, however per the  at the time, the third stent was very difficult and he was unable to fix the 90% in-stent thrombosis.      She also is complaining severe bilateral claudication - has hx of PAD as well w/ known stents (x4, 2 in each leg per patient) - her most recent arterial doppler showed normal ABIs - we discussed cardiac ischemic evaluation w/ plan to perform CTA w/ runoff bilaterally vs peripheral angio w/ runoff given prior stents if claudication persists (at follow-up visit).     She also c/o of mild HF exacerbation today w/ moderate BLE, mildly worse sob at rest over her baseline severe HEAD. See a/p.    Results for orders placed during the hospital encounter of 02/26/24    Echo    Interpretation Summary    Left Ventricle: The left ventricle is normal in size. Mildly increased wall thickness. There is concentric hypertrophy. Regional wall motion abnormalities present. There is mildly reduced systolic function with a visually estimated ejection fraction of 40 - 45%. There is diastolic dysfunction.    Right Ventricle: Normal right ventricular cavity size. Systolic function is normal.    Left Atrium: Left atrium is moderately dilated.    Aortic Valve: The aortic valve is a trileaflet valve.    IVC/SVC: Normal venous pressure at 3 mmHg.          PAST MEDICAL HISTORY:     Past Medical History:   Diagnosis Date    ACC/AHA stage C congestive heart failure due to ischemic cardiomyopathy 03/12/2024    ACE inhibitor intolerance 03/12/2024    Alcohol dependence with uncomplicated withdrawal 07/09/2024    Broken shoulder 11/02/2024    right shoulder    CAD (coronary artery disease) 07/09/2024    Chronic deep vein thrombosis (DVT) of proximal vein of lower extremity, unspecified laterality 09/03/2024     CKD stage 3b, GFR 30-44 ml/min 07/11/2024    Diabetes mellitus, type 2     Episode of recurrent major depressive disorder, unspecified depression episode severity 09/03/2024    Factor 5 Leiden mutation, heterozygous     Familial hyperlipidemia 03/12/2024    Plan to (re) initiate crestor  Did not tolerate lipitor         PAST SURGICAL HISTORY:     Past Surgical History:   Procedure Laterality Date    REVERSE TOTAL SHOULDER ARTHROPLASTY Right 11/26/2024    Procedure: ARTHROPLASTY, SHOULDER, TOTAL, REVERSE;  Surgeon: Miguel Meyer MD;  Location: [facility];  Service: Orthopedics;  Laterality: Right;       ALLERGIES AND MEDICATION:   Review of patient's allergies indicates:  No Known Allergies     Medication List            Accurate as of February 3, 2025  4:33 PM. If you have any questions, ask your nurse or doctor.                CONTINUE taking these medications      albuterol 90 mcg/actuation inhaler  Commonly known as: VENTOLIN HFA  Inhale 2 puffs into the lungs every 6 (six) hours as needed for Wheezing. Rescue     albuterol-ipratropium 2.5 mg-0.5 mg/3 mL nebulizer solution  Commonly known as: DUO-NEB  Take 3 mLs by nebulization every 6 (six) hours as needed for Wheezing. Rescue     blood sugar diagnostic Strp  Commonly known as: ACCU-CHEK GUIDE TEST STRIPS  1 strip by Misc.(Non-Drug; Combo Route) route 3 (three) times daily.     citalopram 40 MG tablet  Commonly known as: CeleXA  TAKE 1 TABLET ONE TIME DAILY     clopidogreL 75 mg tablet  Commonly known as: PLAVIX  TAKE 1 TABLET ONE TIME DAILY     cyclobenzaprine 10 MG tablet  Commonly known as: FLEXERIL  TAKE 1 TABLET TWICE DAILY AS NEEDED FOR MUSCLE SPASM(S)     dapagliflozin propanediol 5 mg Tab tablet  Commonly known as: FARXIGA  Take 1 tablet (5 mg total) by mouth once daily.     docusate sodium 100 MG capsule  Commonly known as: COLACE  Take 2 capsules (200 mg total) by mouth once daily.     ENTRESTO 24-26 mg per tablet  Generic drug:  sacubitriL-valsartan  Take 1 tablet by mouth 2 (two) times daily.     ezetimibe 10 mg tablet  Commonly known as: ZETIA  Take 1 tablet (10 mg total) by mouth once daily.     furosemide 40 MG tablet  Commonly known as: LASIX  Take 1 tablet (40 mg total) by mouth once daily.     gabapentin 800 MG tablet  Commonly known as: NEURONTIN  TAKE 1 TABLET FOUR TIMES DAILY     insulin aspart U-100 100 unit/mL (3 mL) Inpn pen  Commonly known as: NovoLOG Flexpen U-100 Insulin  Inject 5-10 minutes before meals per sliding scale     insulin degludec 100 unit/mL (3 mL) insulin pen  Commonly known as: TRESIBA FLEXTOUCH U-100  Inject 20 Units into the skin every evening.     methylPREDNISolone 4 mg tablet  Commonly known as: MEDROL DOSEPACK  use as directed     metoprolol succinate 25 MG 24 hr tablet  Commonly known as: TOPROL-XL  Take 1 tablet (25 mg total) by mouth 2 (two) times daily.     mupirocin 2 % ointment  Commonly known as: BACTROBAN  Apply topically 2 (two) times daily.     rivaroxaban 20 mg Tab  Commonly known as: XARELTO  Take 1 tablet (20 mg total) by mouth daily with dinner or evening meal.     rosuvastatin 20 MG tablet  Commonly known as: CRESTOR  Take 1 tablet (20 mg total) by mouth once daily.     traZODone 100 MG tablet  Commonly known as: DESYREL  TAKE 1 TABLET EVERY EVENING              SOCIAL HISTORY:     Social History     Socioeconomic History    Marital status: Unknown   Tobacco Use    Smoking status: Every Day     Current packs/day: 0.25     Average packs/day: 0.5 packs/day for 50.1 years (24.8 ttl pk-yrs)     Types: Cigarettes     Start date: 1975    Smokeless tobacco: Never    Tobacco comments:     Patient states 4 singles a day   Substance and Sexual Activity    Alcohol use: Not Currently     Comment: occasionally    Drug use: Never    Sexual activity: Not Currently     Social Drivers of Health     Financial Resource Strain: Medium Risk (8/7/2024)    Overall Financial Resource Strain (CARDIA)      Difficulty of Paying Living Expenses: Somewhat hard   Food Insecurity: Food Insecurity Present (8/7/2024)    Hunger Vital Sign     Worried About Running Out of Food in the Last Year: Sometimes true     Ran Out of Food in the Last Year: Sometimes true   Transportation Needs: No Transportation Needs (7/9/2024)    TRANSPORTATION NEEDS     Transportation : No   Physical Activity: Inactive (8/7/2024)    Exercise Vital Sign     Days of Exercise per Week: 0 days     Minutes of Exercise per Session: 10 min   Stress: Stress Concern Present (8/7/2024)    [employer] - Occupational Stress Questionnaire     Feeling of Stress : Very much   Housing Stability: High Risk (8/7/2024)    Housing Stability Vital Sign     Unable to Pay for Housing in the Last Year: Yes     Homeless in the Last Year: No       FAMILY HISTORY:     Family History   Problem Relation Name Age of Onset    Cancer Mother      Heart failure Father         REVIEW OF SYSTEMS:       Cardiology focused 12-point ROS otherwise unremarkable except for as mentioned in HPI and A/P.   Pertinent Positives and Negatives documented herein.       PHYSICAL EXAM:     Vitals:    02/03/25 1505   BP: 132/72   Pulse: 86    Body mass index is 33.07 kg/m².  Weight: 92.9 kg (204 lb 12.8 oz)     /72 (BP Location: Left arm, Patient Position: Sitting)   Pulse 86   Wt 92.9 kg (204 lb 12.8 oz)   SpO2 (!) 93% Comment: fluid on lungs per patient  BMI 33.07 kg/m²       Gen: NAD  HEENT: NC/AT, MMM  Chest: normal wob  CV: RRR, no m/g/r  Ext: mild/trace edema of BLEs  Skin: no rash  Psych: AMAA  Neuro: CNGI      DATA:     Laboratory:  CBC:  Recent Labs   Lab 11/22/24  1031 11/26/24  1302 11/27/24  0501   WBC 7.72 7.97 8.82   Hemoglobin 11.3 L 10.0 L 8.9 L   Hematocrit 37.4 L 33.2 L 29.7 L   Platelet Count 366 297 275       CHEMISTRIES:  Recent Labs   Lab 07/10/24  0359 07/11/24  0454 11/22/24  1031 11/26/24  1302 11/27/24  0501   Glucose 397 H   < > 210 H  "253 H 324 H   Sodium 135 L   < > 139 141 140   Potassium 4.5   < > 3.7 3.8 4.3   BUN 28 H   < > 12 13 16   Creatinine 1.45 H   < > 0.99 1.05 H 1.27 H   Calcium 8.9   < > 9.0 8.6 8.4   Magnesium 2.0  --   --   --   --     < > = values in this interval not displayed.       CARDIAC BIOMARKERS:      No results found for: "BNP"    COAGS:  Recent Labs   Lab 11/22/24  1031   INR 1.16       LIPIDS/LFTS:  Recent Labs   Lab 11/30/22  1400 07/08/24  2040 07/12/24  0506 08/08/24  1130 11/27/24  0501   Cholesterol 348 H  --   --  192  --    Triglycerides 449 H  --   --  201 H  --    HDL Cholesterol 45  --   --  61 H  --    LDL Calculated  --   --   --  91  --    Non-  --   --  131  --    AST 8 L   < > 10 L 19 12   ALT 20   < > 17 19 <7    < > = values in this interval not displayed.       Hemoglobin A1C   Date Value Ref Range Status   11/27/2024 8.5 (H) <=7.0 % Final     Comment:       Normal:               <5.7%  Pre-Diabetic:       5.7% to 6.4%  Diabetic:             >6.4%  Diabetic Goal:     <7%   07/09/2024 10.0 (H) 4.5 - 6.6 % Final     Comment:       Normal:               <5.7%  Pre-Diabetic:       5.7% to 6.4%  Diabetic:             >6.4%  Diabetic Goal:     <7%   11/30/2022 11.6 (H) 4.5 - 6.6 % Final     Comment:       Normal:               <5.7%  Pre-Diabetic:       5.7% to 6.4%  Diabetic:             >6.4%  Diabetic Goal:     <7%       TSH  Recent Labs   Lab 11/30/22  1400 08/08/24  1130   TSH 0.361 1.300       The 10-year ASCVD risk score (Laura WAGNER, et al., 2019) is: 17.8%    Values used to calculate the score:      Age: 59 years      Sex: Female      Is Non- : No      Diabetic: Yes      Tobacco smoker: Yes      Systolic Blood Pressure: 132 mmHg      Is BP treated: Yes      HDL Cholesterol: 61 mg/dL      Total Cholesterol: 192 mg/dL     IMAGING  No orders to display       ASSESSMENT AND PLAN     Patient Active Problem List   Diagnosis    Heart failure with mid-range ejection fraction " (HFmEF)    Uncontrolled type 2 diabetes mellitus with hyperglycemia    ACC/AHA stage C congestive heart failure due to ischemic cardiomyopathy    Familial hyperlipidemia    Acute on chronic HFrEF (heart failure with reduced ejection fraction)    Factor 5 Leiden mutation, heterozygous    Alcohol dependence with uncomplicated withdrawal    CAD (coronary artery disease)    CKD stage 3b, GFR 30-44 ml/min    Chronic deep vein thrombosis (DVT) of proximal vein of lower extremity, unspecified laterality    Episode of recurrent major depressive disorder, unspecified depression episode severity    Claudication, class IV    Closed fracture of left shoulder    COPD exacerbation    S/P reverse total shoulder arthroplasty, right    Decreased ROM of right shoulder    Acute pain of right shoulder    Decreased functional mobility    Angina, class III       Problem Noted   Angina, Class III 2/3/2025    Is now having daily severe HEAD that is new/worse since last visit - both wrt frequency and severity; patient is now having symptoms even w/ minimal exertion or at rest. Has 3 coronary stents from prior - 2 patent, 1 thought to be severely stenosed (90% per patient).   -discussed lhc and cor angio for further ischemic eval - patient amenable    Risks, benefits and alternatives of the catheterization procedure were discussed with the patient.The risks of coronary angiography include but are not limited to: bleeding, infection, death, heart attack, arrhythmia, kidney injury or failure, potential need for dialysis, allergic reactions, stroke, need for emergency surgery, hematoma, pseudoaneurysm etc.  Should stenting be indicated, the patient has agreed to dual anti-platelet therapy for 1-consecutive year with a drug-eluting stent and a minimum of 1-month with the use of a bare metal stent. Additionally, pt is aware that non-compliance is likely to result in stent clotting with heart attack, heart failure, and/or death  The risks of  moderate sedation include hypotension, respiratory depression, arrhythmias, bronchospasm, and death. Informed consent was obtained and the  patient is agreeable to proceed with the procedure. Consent was placed on the chart.       Claudication, Class IV 11/22/2024    -still c/o of severe bilateral claudication; has 4 peripheral stents per her own recollection;   -will plan on future CTA w/ BLE runoff given persistent symptoms and grossly normal arterial US of suprapopliteal vessel bilaterally; - to do post LHC and cor angio if symptoms persist     Heart Failure With Mid-Range Ejection Fraction (Hfmef) 3/12/2024    Partially recovered EF; ischemic cardiomyopathy; 25%->45% following ACS and PCI in [facility]  Plan: initiate/titrate GDMT      Uncontrolled Type 2 Diabetes Mellitus With Hyperglycemia 3/12/2024   Acc/Aha Stage C Congestive Heart Failure Due to Ischemic Cardiomyopathy 3/12/2024   Familial Hyperlipidemia 3/12/2024    Plan to (re) initiate crestor  Did not tolerate lipitor     Encounter for Cardiac Risk Counseling (Resolved) 11/22/2024    RUE injury, planned TSR Tues. Per patient. No cp or anginal complaints w/ ADLs/iADLs; we discussed given her surgery date is next Tuesday, somewhat limited (in terms of sensitivity), though her CVRI suggests she is low or intermediate risk, depending on her pre-op Hgb level (pending). Either way, feel is reasonable to proceed from risk:benefit standpoint, as there is significant mortality/morbidity/QOL etc if not repaired-  patient understands; agrees she would like to proceed, feel is reasonable, ultimately defer to  choice - happy to discuss if concern    0  >75   1  Cv dz hx  0  angina w ADLs/iADLs  0  vasc surg   0  emergent surg  ?  Hgb <12       0-1 pt: low  2-3 pt: intermed  >3 pt: high     CVRI, JACC 2019, validated, powered for discriminatory fx (30d mortality); superior c-statistic to NSQIP, RCRI, and all risk models prior to 2019         No orders  of the defined types were placed in this encounter.      1. Claudication, class IV    2. Angina, class III                This note was dictated with the help of speech recognition software.  There might be un-intended errors and/or substitutions.

## 2025-02-03 NOTE — PROGRESS NOTES
[facility]   Physical Therapy Treatment Note      Name: Kirk Villar  Clinic Number: [id number]    Therapy Diagnosis:   Encounter Diagnoses   Name Primary?    S/P reverse total shoulder arthroplasty, right Yes    Decreased ROM of right shoulder     Acute pain of right shoulder     Decreased functional mobility        Physician: Yina Lopez PA    Visit Date: 2/3/2025  Physician Orders: PT Eval and Treat   Medical Diagnosis from Referral: see above  Evaluation Date: 1/15/2025  Authorization Period Expiration: 1/15/2025   Plan of Care Expiration: 4/11/2025     Date of Surgery: 11/26/2024  Visit # / Visits authorized: 4 total (1/24 as of 2/1)  FOTO: 1/ 3 = 39     Precautions: Standard - 7 weeks postop      PTA Visit #: 1/5     Time In: 4:49 pm  Time Out: 5:24 pm  Total Billable Time: 31 minutes    Subjective     Pt reports: shoulder is hurting today, popping and clicking.  She was compliant with home exercise program.  Response to previous treatment: no complaints   Functional change: no change noted    Pain: 1-2/10 without meds  Location: right shoulder and upper arm    Objective      Active range of motion right shoulder: 25 degrees flexion, external rotation 5 degrees in adduction, internal rotation to ischial tuberosity     Case conference with Ayse Mendoza PT, for initial PTA visit.     Treatment     Kirk received the treatments listed below:      therapeutic exercises to develop strength, endurance, ROM, flexibility, posture, and core stabilization for 10 minutes including:  Upper body ergometer x 5 minutes - reverse to activate scapular stabilizers  Pulleys x 5 minutes    manual therapy techniques: Joint mobilizations, Manual traction, Myofacial release, Soft tissue Mobilization, and Friction Massage were applied for 12 minutes, including:  Passive stretching into flexion, external rotation and internal rotation in supine (head elevated)  MFR to right upper arm  musculature    neuromuscular re-education activities to improve: Balance, Coordination, Kinesthetic Sense, Proprioception, and Posture for 6 minutes. The following activities were included:  Scapular retraction/extension - red x 30  Scap pnf  Adduction squeeze with wrist extension   Supine flexion with hands clasped x 20    therapeutic activities to improve functional performance for 4  minutes, including:  Cane flexion up rail  Rows 1 plate 3 x 10  Hang out stretch 5 x 10 second hold       direct contact modalities after being cleared for contraindications:     supervised modalities after being cleared for contradictions:       Patient Education and Home Exercises       Education provided:   - review of home exercise program and current Plan of Care/rationale of treatment.    Written Home Exercises Provided: Patient instructed to cont prior HEP. Exercises were reviewed and Kirk was able to demonstrate them prior to the end of the session.  Kirk demonstrated good understanding of the education provided. See EMR under Patient Instructions for exercises provided during therapy sessions    Assessment     At Evaluation:  Kirk is a 59 y.o. female referred to outpatient Physical Therapy with a medical diagnosis of s/p right reverse TSR. Patient presents with postop symptoms of pain, decreased range of motion, decreased strength, decreased functional mobility and ability to perform activiites of daily living. She was still wearing her sling. Her surgery was on 11/26/24 and she has not had any therapy to date. Instructed her to d/c sling and gave her some gentle range of motion and scapular stabilization activities to begin. She lives alone so she has had to figure out how to dress herself and get her shower. She has just been able to start washing a few dishes. Sweeping and mopping are very difficult. She did not drive herself today. Kirk has cardiac issues that have had her on disability for some years.      Current Assessment:  Kirk arrived with complaint of pain and popping. Her upper arm was tight to palpation.  She was very apprehensive during passive range of motion/myofascial release and had difficulty relaxing arm. She had less active range of motion today than last visit. She was shown how to do a hang out stretch and did get some relief there. Will continue Plan of Care and progress as tolerated.    Kirk Is progressing towards her goals.   Pt prognosis is Good.     Pt will continue to benefit from skilled outpatient physical therapy to address the deficits listed in the problem list box on initial evaluation, provide pt/family education and to maximize pt's level of independence in the home and community environment.     Pt's spiritual, cultural and educational needs considered and pt agreeable to plan of care and goals.     Anticipated barriers to physical therapy: none    Goals: Short Term Goals: 6 weeks  Patient will be independent with home exercise program to facilitate carryover between visits.  Patient will have passive range of motion as follows - 120 degrees flexion, 25 degrees external rotation and 45 degrees internal rotation.  Patient will be independent with dressing and driving without modification.  Patient will be able to sleep all night in bed without waking more than once due to right shoulder pain.     Long Term Goals: 12 weeks  Patient will have active range of motion as follow - 120 degrees flexion, 45-50 degrees external rotation and functional internal rotation to L1 for reaching overhead, behind head and behind back for dressing, grooming and hygiene.  Patient will have 4-4+/5 strength right shoulder/upper extremity to perform household chores and work related tasks.  Patient will place 2# dumbbell on head height shelf without pain right shoulder.  Patient will return to performing mopping, sweeping, cooking and laundry to be functionally independent.     Plan     Plan of care  Certification: 1/15/2025 to 4/11/2025.     Outpatient Physical Therapy 2 times weekly for 12 weeks to include the following interventions: 03354 [therapeutic exercise], 97400 [neuromuscular re-education], 57933 [manual therapy], 27727 [therapeutic activities], 68096 [ultrasound], 81102 [unattended electrical stimulation], and 33275 [vasopneumatic device].     Re Luna, PTA   02/03/2025

## 2025-02-04 ENCOUNTER — TELEPHONE (OUTPATIENT)
Dept: CARDIOLOGY | Facility: CLINIC | Age: 60
End: 2025-02-04
Payer: MEDICARE

## 2025-02-04 RX ORDER — SODIUM CHLORIDE 9 MG/ML
INJECTION, SOLUTION INTRAVENOUS ONCE
Status: CANCELLED | OUTPATIENT
Start: 2025-03-04

## 2025-02-04 RX ORDER — SODIUM CHLORIDE 0.9 % (FLUSH) 0.9 %
10 SYRINGE (ML) INJECTION
Status: CANCELLED | OUTPATIENT
Start: 2025-03-04

## 2025-02-04 NOTE — TELEPHONE ENCOUNTER
Called to inform patient her cath date will be 3/4/25 and to make sure she reads over the instructions given at appointment on 2/3/25.  JOSÉ full  -HW

## 2025-02-04 NOTE — TELEPHONE ENCOUNTER
Called patient and left VM informing her Dr. Diggs agreed to have cath lab give her something to calm her prior to procedure the day of. Entresto was increased and sent to her pharmacy. He will be doing access through her groin/femoral so he will take images to see if there is any treatment needed peripherally, however, he will not be doing any corrections that day if there is stents needed.    Left callback [phone]  -      Returned call to patient and she had some questions about the procedure scheduled in cath lab.  -could she have something to calm her before procedure due to her having severe anxiety?  -did he still want to increase her Entresto?  -Will he be doing procedure for LHC and peripheral? Groin or radial access?    I informed her she will need to have labs done prior to procedure (1st floor of clinic building). She can come one day she does physical therapy for her shoulder.    She is aware I am going to address questions with Dr. Diggs and I will call her back either this evening or in the morning with answers.    -      ----- Message from Kathy sent at 2/4/2025 10:32 AM [facility] -----  Regarding: Missed call    Who Called: Kirk Villar    Patient is returning phone call    Who Left Message for Patient: Nurse Karolina  Does the patient know what this is regarding?:Yes      Preferred Method of Contact: Phone Call  Patient's Preferred Phone Number on File: [phone]     Additional Information: I informed pt. Of the Cath lab date and for her to read her instructions prior the procedure. She stated she still needs to speak to the nurse about when to stop taking her blood thinners and her Stents were put in [place], [place] at [facility]

## 2025-02-05 RX ORDER — SACUBITRIL AND VALSARTAN 49; 51 MG/1; MG/1
1 TABLET, FILM COATED ORAL 2 TIMES DAILY
Qty: 180 TABLET | Refills: 1 | Status: SHIPPED | OUTPATIENT
Start: 2025-02-05 | End: 2025-02-14 | Stop reason: SDUPTHER

## 2025-02-10 ENCOUNTER — CLINICAL SUPPORT (OUTPATIENT)
Dept: REHABILITATION | Facility: HOSPITAL | Age: 60
End: 2025-02-10
Payer: MEDICARE

## 2025-02-10 DIAGNOSIS — M25.611 DECREASED ROM OF RIGHT SHOULDER: ICD-10-CM

## 2025-02-10 DIAGNOSIS — Z96.611 S/P REVERSE TOTAL SHOULDER ARTHROPLASTY, RIGHT: Primary | ICD-10-CM

## 2025-02-10 DIAGNOSIS — R26.89 DECREASED FUNCTIONAL MOBILITY: ICD-10-CM

## 2025-02-10 DIAGNOSIS — M25.511 ACUTE PAIN OF RIGHT SHOULDER: ICD-10-CM

## 2025-02-10 PROCEDURE — 97140 MANUAL THERAPY 1/> REGIONS: CPT

## 2025-02-10 PROCEDURE — 97110 THERAPEUTIC EXERCISES: CPT

## 2025-02-10 PROCEDURE — 97112 NEUROMUSCULAR REEDUCATION: CPT

## 2025-02-10 NOTE — PROGRESS NOTES
[facility]   Physical Therapy Treatment Note      Name: Kirk Villar  Clinic Number: [id number]    Therapy Diagnosis:   Encounter Diagnoses   Name Primary?    S/P reverse total shoulder arthroplasty, right Yes    Decreased ROM of right shoulder     Acute pain of right shoulder     Decreased functional mobility        Physician: Yina Lopez PA    Visit Date: 2/10/2025  Physician Orders: PT Eval and Treat   Medical Diagnosis from Referral: see above  Evaluation Date: 1/15/2025  Authorization Period Expiration: 1/15/2025   Plan of Care Expiration: 4/11/2025     Date of Surgery: 11/26/2024  Visit # / Visits authorized: 5 total (1/24 as of 2/1)  FOTO: 1/ 3 = 39     Precautions: Standard - 7 weeks postop      PTA Visit #: 0/5     Time In: 4:45 pm  Time Out: 5:23 pm  Total Billable Time: 38 minutes    Subjective     Pt reports: shoulder is hurting today, popping and clicking.  She was compliant with home exercise program.  Response to previous treatment: no complaints   Functional change: no change noted    Pain: 1-2/10 without meds  Location: right shoulder and upper arm    Objective      Active range of motion right shoulder: 25 degrees flexion, external rotation 5 degrees in adduction, internal rotation to ischial tuberosity      Treatment     Kirk received the treatments listed below:      therapeutic exercises to develop strength, endurance, ROM, flexibility, posture, and core stabilization for 18 minutes including:  Upper body ergometer x 5 minutes - reverse to activate scapular stabilizers (not today)  Pulleys x 5 minutes  Supine cane flexion 4 x 5  Supine cane press 4 x 5  Ball flexion rolls 10 x 10sh  Bicep curls 3# 3 x 10    manual therapy techniques: Joint mobilizations, Manual traction, Myofacial release, Soft tissue Mobilization, and Friction Massage were applied for 12 minutes, including:  Passive stretching into flexion, external rotation and internal rotation in  supine (head elevated)  MFR to right upper arm musculature    neuromuscular re-education activities to improve: Balance, Coordination, Kinesthetic Sense, Proprioception, and Posture for 8 minutes. The following activities were included:  Scapular retraction/extension - red 3 x 10 (seated)  Rows w/ theraband - red 3 x 10 (seated)    Scap pnf  Adduction squeeze with wrist extension   Supine flexion with hands clasped x 20    therapeutic activities to improve functional performance for 0 minutes, including:  Cane flexion up rail  Rows 1 plate 3 x 10  Hang out stretch 5 x 10 second hold       direct contact modalities after being cleared for contraindications:     supervised modalities after being cleared for contradictions:       Patient Education and Home Exercises       Education provided:   - review of home exercise program and current Plan of Care/rationale of treatment.    Written Home Exercises Provided: Patient instructed to cont prior HEP. Exercises were reviewed and Kirk was able to demonstrate them prior to the end of the session.  Kirk demonstrated good understanding of the education provided. See EMR under Patient Instructions for exercises provided during therapy sessions    Assessment     At Evaluation:  Kirk is a 59 y.o. female referred to outpatient Physical Therapy with a medical diagnosis of s/p right reverse TSR. Patient presents with postop symptoms of pain, decreased range of motion, decreased strength, decreased functional mobility and ability to perform activiites of daily living. She was still wearing her sling. Her surgery was on 11/26/24 and she has not had any therapy to date. Instructed her to d/c sling and gave her some gentle range of motion and scapular stabilization activities to begin. She lives alone so she has had to figure out how to dress herself and get her shower. She has just been able to start washing a few dishes. Sweeping and mopping are very difficult. She did not  drive herself today. Kirk has cardiac issues that have had her on disability for some years.     Current Assessment:  Kirk arrived stating she has not been doing well. Saw her cardiologist last week and they think she has another blockage. She is scheduled for a heart cath but they could not get her in until 3/4/25. States MD told her to go to the ER if her SOB got too bad. Kept intensity of session down. Had her do what she could seated or reclined on the mat. Also tried to limit her walking by keeping her activities localized to one small area of the gym. Will continue Plan of Care and progress as tolerated. Will modify activities as needed so as to not exacerbate her cardiopulmonary issues.    Kirk Is progressing towards her goals.   Pt prognosis is Good.     Pt will continue to benefit from skilled outpatient physical therapy to address the deficits listed in the problem list box on initial evaluation, provide pt/family education and to maximize pt's level of independence in the home and community environment.     Pt's spiritual, cultural and educational needs considered and pt agreeable to plan of care and goals.     Anticipated barriers to physical therapy: none    Goals: Short Term Goals: 6 weeks  Patient will be independent with home exercise program to facilitate carryover between visits.  Patient will have passive range of motion as follows - 120 degrees flexion, 25 degrees external rotation and 45 degrees internal rotation.  Patient will be independent with dressing and driving without modification.  Patient will be able to sleep all night in bed without waking more than once due to right shoulder pain.     Long Term Goals: 12 weeks  Patient will have active range of motion as follow - 120 degrees flexion, 45-50 degrees external rotation and functional internal rotation to L1 for reaching overhead, behind head and behind back for dressing, grooming and hygiene.  Patient will have 4-4+/5 strength  right shoulder/upper extremity to perform household chores and work related tasks.  Patient will place 2# dumbbell on head height shelf without pain right shoulder.  Patient will return to performing mopping, sweeping, cooking and laundry to be functionally independent.     Plan     Plan of care Certification: 1/15/2025 to 4/11/2025.     Outpatient Physical Therapy 2 times weekly for 12 weeks to include the following interventions: 28281 [therapeutic exercise], 23587 [neuromuscular re-education], 46420 [manual therapy], 52359 [therapeutic activities], 99487 [ultrasound], 08899 [unattended electrical stimulation], and 42148 [vasopneumatic device].     ELIDIA SOLIS, PT   02/10/2025

## 2025-02-12 ENCOUNTER — CLINICAL SUPPORT (OUTPATIENT)
Dept: REHABILITATION | Facility: HOSPITAL | Age: 60
End: 2025-02-12
Payer: MEDICARE

## 2025-02-12 DIAGNOSIS — M25.511 ACUTE PAIN OF RIGHT SHOULDER: ICD-10-CM

## 2025-02-12 DIAGNOSIS — Z96.611 S/P REVERSE TOTAL SHOULDER ARTHROPLASTY, RIGHT: Primary | ICD-10-CM

## 2025-02-12 DIAGNOSIS — R26.89 DECREASED FUNCTIONAL MOBILITY: ICD-10-CM

## 2025-02-12 DIAGNOSIS — M25.611 DECREASED ROM OF RIGHT SHOULDER: ICD-10-CM

## 2025-02-12 PROCEDURE — 97110 THERAPEUTIC EXERCISES: CPT

## 2025-02-12 PROCEDURE — 97140 MANUAL THERAPY 1/> REGIONS: CPT

## 2025-02-12 PROCEDURE — 97112 NEUROMUSCULAR REEDUCATION: CPT

## 2025-02-12 NOTE — PROGRESS NOTES
[facility]   Physical Therapy Treatment Note      Name: Kirk Villar  Clinic Number: [id number]    Therapy Diagnosis:   No diagnosis found.      Physician: Yina Lopez PA    Visit Date: 2/12/2025  Physician Orders: PT Eval and Treat   Medical Diagnosis from Referral: see above  Evaluation Date: 1/15/2025  Authorization Period Expiration: 1/15/2025   Plan of Care Expiration: 4/11/2025     Date of Surgery: 11/26/2024  Visit # / Visits authorized: 6 total (3/24 as of 2/1)  FOTO: 1/ 3 = 39     Precautions: Standard - 7 weeks postop      PTA Visit #: 0/5     Time In: 4:45 pm  Time Out: 5:29 pm  Total Billable Time: 44 minutes    Subjective     Pt reports: feels better from her other health issues but shoulder is hurting today  She was compliant with home exercise program.  Response to previous treatment: no complaints   Functional change: no change noted    Pain: 1-2/10 without meds  Location: right shoulder and upper arm    Objective      Passive range of motion right shoulder: 100 degrees flexion, external rotation 40 degrees in supine at 45 degrees scaption, internal rotation to 48 degrees in supine at 45 degrees scaption    Treatment     Kirk received the treatments listed below:      therapeutic exercises to develop strength, endurance, ROM, flexibility, posture, and core stabilization for 20 minutes including:  Upper body ergometer x 5 minutes - reverse to activate scapular stabilizers/ forward for range of motion   Pulleys x 5 minutes  Supine cane flexion 4 x 5  Supine cane press 4 x 5  Ball flexion rolls 10 x 10sh    Bicep curls 3# 3 x 10 - not today    manual therapy techniques: Joint mobilizations, Manual traction, Myofacial release, Soft tissue Mobilization, and Friction Massage were applied for 14 minutes, including:  Passive stretching into flexion, external rotation and internal rotation in supine (head elevated)  MFR to right upper arm  musculature    neuromuscular re-education activities to improve: Balance, Coordination, Kinesthetic Sense, Proprioception, and Posture for 10 minutes. The following activities were included:  Scapular retraction/extension - red 3 x 10 (seated)  Rows w/ theraband - red 3 x 10 (seated)    Scap pnf  Adduction squeeze with wrist extension   Supine flexion with hands clasped x 20    therapeutic activities to improve functional performance for 0 minutes, including:  Cane flexion up rail  Rows 1 plate 3 x 10  Hang out stretch 5 x 10 second hold       direct contact modalities after being cleared for contraindications:     supervised modalities after being cleared for contradictions:       Patient Education and Home Exercises       Education provided:   - review of home exercise program and current Plan of Care/rationale of treatment.    Written Home Exercises Provided: Patient instructed to cont prior HEP. Exercises were reviewed and Kirk was able to demonstrate them prior to the end of the session.  Kirk demonstrated good understanding of the education provided. See EMR under Patient Instructions for exercises provided during therapy sessions    Assessment     At Evaluation:  Kirk is a 59 y.o. female referred to outpatient Physical Therapy with a medical diagnosis of s/p right reverse TSR. Patient presents with postop symptoms of pain, decreased range of motion, decreased strength, decreased functional mobility and ability to perform activiites of daily living. She was still wearing her sling. Her surgery was on 11/26/24 and she has not had any therapy to date. Instructed her to d/c sling and gave her some gentle range of motion and scapular stabilization activities to begin. She lives alone so she has had to figure out how to dress herself and get her shower. She has just been able to start washing a few dishes. Sweeping and mopping are very difficult. She did not drive herself today. Kirk has cardiac issues  that have had her on disability for some years.     Current Assessment:  Kirk arrived stating she was feeling better from her other health issues but the shoulder was hurting this afternoon. She was able to start with the arm bike today. Her passive range of motion in the right shoulder is improving but active range of motion is still very difficult. She was complaining of some pain in the right elbow so we did not do the bicep curls today. She still requires cueing to hold head up while performing rows and scapular retraction/extension. Supine cane flexion and cane press remain very challenging. Will continue Plan of Care and progress as tolerated. Will modify activities as needed so as to not exacerbate her cardiopulmonary issues.    Kirk Is progressing towards her goals.   Pt prognosis is Good.     Pt will continue to benefit from skilled outpatient physical therapy to address the deficits listed in the problem list box on initial evaluation, provide pt/family education and to maximize pt's level of independence in the home and community environment.     Pt's spiritual, cultural and educational needs considered and pt agreeable to plan of care and goals.     Anticipated barriers to physical therapy: none    Goals: Short Term Goals: 6 weeks  Patient will be independent with home exercise program to facilitate carryover between visits.  Patient will have passive range of motion as follows - 120 degrees flexion, 25 degrees external rotation and 45 degrees internal rotation.  Patient will be independent with dressing and driving without modification.  Patient will be able to sleep all night in bed without waking more than once due to right shoulder pain.     Long Term Goals: 12 weeks  Patient will have active range of motion as follow - 120 degrees flexion, 45-50 degrees external rotation and functional internal rotation to L1 for reaching overhead, behind head and behind back for dressing, grooming and  hygiene.  Patient will have 4-4+/5 strength right shoulder/upper extremity to perform household chores and work related tasks.  Patient will place 2# dumbbell on head height shelf without pain right shoulder.  Patient will return to performing mopping, sweeping, cooking and laundry to be functionally independent.     Plan     Plan of care Certification: 1/15/2025 to 4/11/2025.     Outpatient Physical Therapy 2 times weekly for 12 weeks to include the following interventions: 96096 [therapeutic exercise], 21445 [neuromuscular re-education], 42116 [manual therapy], 43627 [therapeutic activities], 68024 [ultrasound], 19798 [unattended electrical stimulation], and 75385 [vasopneumatic device].     ELIDIA SOLIS, PT   02/12/2025

## 2025-02-14 ENCOUNTER — OFFICE VISIT (OUTPATIENT)
Dept: FAMILY MEDICINE | Facility: CLINIC | Age: 60
End: 2025-02-14
Payer: MEDICARE

## 2025-02-14 VITALS
HEIGHT: 66 IN | DIASTOLIC BLOOD PRESSURE: 83 MMHG | HEART RATE: 88 BPM | SYSTOLIC BLOOD PRESSURE: 143 MMHG | TEMPERATURE: 98 F | OXYGEN SATURATION: 95 % | WEIGHT: 193 LBS | RESPIRATION RATE: 20 BRPM | BODY MASS INDEX: 31.02 KG/M2

## 2025-02-14 DIAGNOSIS — I10 PRIMARY HYPERTENSION: ICD-10-CM

## 2025-02-14 DIAGNOSIS — E78.2 MIXED HYPERLIPIDEMIA: ICD-10-CM

## 2025-02-14 DIAGNOSIS — Z79.4 TYPE 2 DIABETES MELLITUS WITH HYPERGLYCEMIA, WITH LONG-TERM CURRENT USE OF INSULIN: ICD-10-CM

## 2025-02-14 DIAGNOSIS — J44.9 CHRONIC OBSTRUCTIVE PULMONARY DISEASE, UNSPECIFIED COPD TYPE: ICD-10-CM

## 2025-02-14 DIAGNOSIS — E11.65 TYPE 2 DIABETES MELLITUS WITH HYPERGLYCEMIA, WITH LONG-TERM CURRENT USE OF INSULIN: ICD-10-CM

## 2025-02-14 DIAGNOSIS — F41.1 GENERALIZED ANXIETY DISORDER: ICD-10-CM

## 2025-02-14 PROCEDURE — 3079F DIAST BP 80-89 MM HG: CPT | Mod: CPTII,,, | Performed by: NURSE PRACTITIONER

## 2025-02-14 PROCEDURE — 4010F ACE/ARB THERAPY RXD/TAKEN: CPT | Mod: CPTII,,, | Performed by: NURSE PRACTITIONER

## 2025-02-14 PROCEDURE — 3008F BODY MASS INDEX DOCD: CPT | Mod: CPTII,,, | Performed by: NURSE PRACTITIONER

## 2025-02-14 PROCEDURE — 99213 OFFICE O/P EST LOW 20 MIN: CPT | Mod: ,,, | Performed by: NURSE PRACTITIONER

## 2025-02-14 PROCEDURE — 1159F MED LIST DOCD IN RCRD: CPT | Mod: CPTII,,, | Performed by: NURSE PRACTITIONER

## 2025-02-14 PROCEDURE — 3077F SYST BP >= 140 MM HG: CPT | Mod: CPTII,,, | Performed by: NURSE PRACTITIONER

## 2025-02-14 PROCEDURE — 1160F RVW MEDS BY RX/DR IN RCRD: CPT | Mod: CPTII,,, | Performed by: NURSE PRACTITIONER

## 2025-02-14 RX ORDER — CYCLOBENZAPRINE HCL 10 MG
10 TABLET ORAL 3 TIMES DAILY PRN
Qty: 60 TABLET | Refills: 11 | Status: SHIPPED | OUTPATIENT
Start: 2025-02-14

## 2025-02-14 RX ORDER — GABAPENTIN 800 MG/1
800 TABLET ORAL 4 TIMES DAILY PRN
Qty: 270 TABLET | Refills: 0 | Status: SHIPPED | OUTPATIENT
Start: 2025-02-14

## 2025-02-14 RX ORDER — TRAZODONE HYDROCHLORIDE 100 MG/1
100 TABLET ORAL NIGHTLY
Qty: 90 TABLET | Refills: 3 | Status: SHIPPED | OUTPATIENT
Start: 2025-02-14

## 2025-02-14 RX ORDER — ALBUTEROL SULFATE 90 UG/1
2 INHALANT RESPIRATORY (INHALATION) EVERY 6 HOURS PRN
Qty: 18 G | Refills: 2 | Status: SHIPPED | OUTPATIENT
Start: 2025-02-14 | End: 2026-02-14

## 2025-02-14 RX ORDER — CLOPIDOGREL BISULFATE 75 MG/1
75 TABLET ORAL DAILY
Qty: 90 TABLET | Refills: 3 | Status: SHIPPED | OUTPATIENT
Start: 2025-02-14

## 2025-02-14 RX ORDER — EZETIMIBE 10 MG/1
10 TABLET ORAL DAILY
Qty: 90 TABLET | Refills: 2 | Status: SHIPPED | OUTPATIENT
Start: 2025-02-14

## 2025-02-14 RX ORDER — DAPAGLIFLOZIN 5 MG/1
5 TABLET, FILM COATED ORAL DAILY
Qty: 90 TABLET | Refills: 1 | Status: SHIPPED | OUTPATIENT
Start: 2025-02-14

## 2025-02-14 RX ORDER — METOPROLOL SUCCINATE 25 MG/1
25 TABLET, EXTENDED RELEASE ORAL 2 TIMES DAILY
Qty: 180 TABLET | Refills: 2 | Status: SHIPPED | OUTPATIENT
Start: 2025-02-14

## 2025-02-14 RX ORDER — IPRATROPIUM BROMIDE AND ALBUTEROL SULFATE 2.5; .5 MG/3ML; MG/3ML
3 SOLUTION RESPIRATORY (INHALATION) EVERY 6 HOURS PRN
Qty: 75 ML | Refills: 0 | Status: SHIPPED | OUTPATIENT
Start: 2025-02-14 | End: 2026-02-14

## 2025-02-14 RX ORDER — SACUBITRIL AND VALSARTAN 49; 51 MG/1; MG/1
1 TABLET, FILM COATED ORAL 2 TIMES DAILY
Qty: 180 TABLET | Refills: 1 | Status: SHIPPED | OUTPATIENT
Start: 2025-02-14 | End: 2025-08-13

## 2025-02-14 RX ORDER — CITALOPRAM 40 MG/1
40 TABLET, FILM COATED ORAL DAILY
Qty: 90 TABLET | Refills: 3 | Status: SHIPPED | OUTPATIENT
Start: 2025-02-14

## 2025-02-14 RX ORDER — ROSUVASTATIN CALCIUM 20 MG/1
20 TABLET, COATED ORAL DAILY
Qty: 90 TABLET | Refills: 1 | Status: SHIPPED | OUTPATIENT
Start: 2025-02-14 | End: 2026-02-14

## 2025-02-14 RX ORDER — GABAPENTIN 800 MG/1
800 TABLET ORAL 4 TIMES DAILY PRN
COMMUNITY
End: 2025-02-14 | Stop reason: SDUPTHER

## 2025-02-21 ENCOUNTER — PATIENT MESSAGE (OUTPATIENT)
Dept: FAMILY MEDICINE | Facility: CLINIC | Age: 60
End: 2025-02-21
Payer: MEDICARE

## 2025-02-24 ENCOUNTER — CLINICAL SUPPORT (OUTPATIENT)
Dept: REHABILITATION | Facility: HOSPITAL | Age: 60
End: 2025-02-24
Payer: MEDICARE

## 2025-02-24 DIAGNOSIS — M25.511 ACUTE PAIN OF RIGHT SHOULDER: ICD-10-CM

## 2025-02-24 DIAGNOSIS — M25.611 DECREASED ROM OF RIGHT SHOULDER: ICD-10-CM

## 2025-02-24 DIAGNOSIS — R26.89 DECREASED FUNCTIONAL MOBILITY: ICD-10-CM

## 2025-02-24 DIAGNOSIS — Z96.611 S/P REVERSE TOTAL SHOULDER ARTHROPLASTY, RIGHT: Primary | ICD-10-CM

## 2025-02-24 PROCEDURE — 97140 MANUAL THERAPY 1/> REGIONS: CPT | Mod: CQ

## 2025-02-24 PROCEDURE — 97110 THERAPEUTIC EXERCISES: CPT | Mod: CQ

## 2025-02-24 PROCEDURE — 97112 NEUROMUSCULAR REEDUCATION: CPT | Mod: CQ

## 2025-02-24 NOTE — PROGRESS NOTES
[facility]   Physical Therapy Treatment Note      Name: Kirk Villar  Clinic Number: [id number]    Therapy Diagnosis:   Encounter Diagnoses   Name Primary?    S/P reverse total shoulder arthroplasty, right Yes    Decreased ROM of right shoulder     Acute pain of right shoulder     Decreased functional mobility          Physician: Yina Lopez PA    Visit Date: 2/24/2025  Physician Orders: PT Eval and Treat   Medical Diagnosis from Referral: see above  Evaluation Date: 1/15/2025  Authorization Period Expiration: 1/15/2025   Plan of Care Expiration: 4/11/2025     Date of Surgery: 11/26/2024  Visit # / Visits authorized: 7 total (3/24 as of 2/1)  FOTO: 1/ 3 = 39    2/3 = 56     Precautions: Standard - 7 weeks postop      PTA Visit #: 1/5     Time In: 4:48 pm  Time Out: 5:26 pm  Total Billable Time: 38 minutes    Subjective     Pt reports: her shoulder feels ok, she still gets short of breath easily - scheduled for heart cath next week (Tuesday). She still gets some muscle spasms in her arm, down to her elbow.  She was compliant with home exercise program.  Response to previous treatment: no complaints   Functional change: no change noted    Pain: 1-2/10 without meds  Location: right shoulder and upper arm    Objective      Passive range of motion right shoulder: 100 degrees flexion, external rotation 40 degrees in supine at 45 degrees scaption, internal rotation to 48 degrees in supine at 45 degrees scaption    Treatment     Kirk received the treatments listed below:      therapeutic exercises to develop strength, endurance, ROM, flexibility, posture, and core stabilization for 18 minutes including:  Upper body ergometer x 3 minutes - reverse to activate scapular stabilizers/ forward for range of motion   Pulleys x 3 minutes  Supine cane flexion 4 x 5  Supine cane press 4 x 5  Ball flexion rolls 10 x 10 second hold     Bicep curls 3# 3 x 10 - not today    manual therapy  techniques: Joint mobilizations, Manual traction, Myofacial release, Soft tissue Mobilization, and Friction Massage were applied for 12 minutes, including:  Passive stretching into flexion, external rotation and internal rotation in supine (head elevated)  MFR to right upper arm musculature    neuromuscular re-education activities to improve: Balance, Coordination, Kinesthetic Sense, Proprioception, and Posture for 8 minutes. The following activities were included:  Scapular retraction/extension - red 3 x 10 (seated)  Rows w/ theraband - red 3 x 10 (seated)    Scap pnf  Adduction squeeze with wrist extension   Supine flexion with hands clasped x 20    therapeutic activities to improve functional performance for 0 minutes, including:  Cane flexion up rail  Rows 1 plate 3 x 10  Hang out stretch 5 x 10 second hold       direct contact modalities after being cleared for contraindications:     supervised modalities after being cleared for contradictions:       Patient Education and Home Exercises       Education provided:   - review of home exercise program and current Plan of Care/rationale of treatment.    Written Home Exercises Provided: Patient instructed to cont prior HEP. Exercises were reviewed and Kirk was able to demonstrate them prior to the end of the session.  Kirk demonstrated good understanding of the education provided. See EMR under Patient Instructions for exercises provided during therapy sessions    Assessment     At Evaluation:  Kirk is a 59 y.o. female referred to outpatient Physical Therapy with a medical diagnosis of s/p right reverse TSR. Patient presents with postop symptoms of pain, decreased range of motion, decreased strength, decreased functional mobility and ability to perform activiites of daily living. She was still wearing her sling. Her surgery was on 11/26/24 and she has not had any therapy to date. Instructed her to d/c sling and gave her some gentle range of motion and  scapular stabilization activities to begin. She lives alone so she has had to figure out how to dress herself and get her shower. She has just been able to start washing a few dishes. Sweeping and mopping are very difficult. She did not drive herself today. Kirk has cardiac issues that have had her on disability for some years.     Current Assessment:  Kirk remains tight throughout right shoulder. She needs frequent cues to hold her head up with exercises. Her FOTO score has improved from 39 to 56. She hikes with any active flexion. Supine cane exercises were challenging and a little painful today. She is scheduled to see Dr Meyer on Wednesday prior to PT appt. Will continue Plan of Care and progress as tolerated. Will modify activities as needed so as to not exacerbate her cardiopulmonary issues.    Kirk Is progressing towards her goals.   Pt prognosis is Good.     Pt will continue to benefit from skilled outpatient physical therapy to address the deficits listed in the problem list box on initial evaluation, provide pt/family education and to maximize pt's level of independence in the home and community environment.     Pt's spiritual, cultural and educational needs considered and pt agreeable to plan of care and goals.     Anticipated barriers to physical therapy: none    Goals: Short Term Goals: 6 weeks  Patient will be independent with home exercise program to facilitate carryover between visits.  Patient will have passive range of motion as follows - 120 degrees flexion, 25 degrees external rotation and 45 degrees internal rotation.  Patient will be independent with dressing and driving without modification.  Patient will be able to sleep all night in bed without waking more than once due to right shoulder pain.     Long Term Goals: 12 weeks  Patient will have active range of motion as follow - 120 degrees flexion, 45-50 degrees external rotation and functional internal rotation to L1 for reaching  overhead, behind head and behind back for dressing, grooming and hygiene.  Patient will have 4-4+/5 strength right shoulder/upper extremity to perform household chores and work related tasks.  Patient will place 2# dumbbell on head height shelf without pain right shoulder.  Patient will return to performing mopping, sweeping, cooking and laundry to be functionally independent.     Plan     Plan of care Certification: 1/15/2025 to 4/11/2025.     Outpatient Physical Therapy 2 times weekly for 12 weeks to include the following interventions: 43170 [therapeutic exercise], 18319 [neuromuscular re-education], 90381 [manual therapy], 27073 [therapeutic activities], 05796 [ultrasound], 14938 [unattended electrical stimulation], and 77973 [vasopneumatic device].     Re Luna, PTA   02/24/2025

## 2025-02-25 DIAGNOSIS — Z96.611 S/P REVERSE TOTAL SHOULDER ARTHROPLASTY, RIGHT: Primary | ICD-10-CM

## 2025-02-26 NOTE — PROGRESS NOTES
Anabell Mendez NP   [address] [place] [address]     PATIENT NAME: Kirk Villar  : 1965  DATE: 25  MRN: [id number]      Billing Provider: Anabell Mendez NP  Level of Service: LA OFFICE/OUTPT VISIT, EST, LEVL III, 20-29 MIN  Patient PCP Information       Provider PCP Type    Anabell Mendez NP General            Reason for Visit / Chief Complaint: Diabetes (Discuss meds. Change pharmacy)       Update PCP  Update Chief Complaint         History of Present Illness / Problem Focused Workflow     Kirk Villar presents to the clinic with Diabetes (Discuss meds. Change pharmacy)     History of Present Illness    HPI:  Patient reports ongoing issues with walking due to burning pain in calves and shins, severe enough to prevent ambulation. She is scheduled for a heart catheterization in March with Dr. Vazquez, who is concerned about her condition.    Patient reports fluid retention in her lungs, for which she takes 160 mg of Lasix daily early in the morning to manage symptoms and avoid nighttime urination.    Regarding diabetes management, her most recent A1C was 8.5, the lowest it has been in a while, down from previous levels over 10. She reports feeling better and losing some weight.    Patient expresses concerns about memory issues and tremors, which she believes may be related to her current medication regimen. She also mentions pain on the top of her feet when lying down, which she speculates might be related to blood flow issues.    Patient is currently taking medication for neuropathy symptom management, making it difficult to assess any improvement in her condition as her diabetes has become more controlled.    Patient denies needing test strips or any recent changes in neuropathy symptoms due to continuous medication use.    MEDICATIONS:  Patient is on Albuterol, Clopidogrel (Plavix), and Flexeril. She has 2 bottles remaining of Farxiga. She takes Lasix 160 mg daily, early in the  morning for fluid on lungs. Patient is on the highest possible dose of Gabapentin, taken 2.5-3 times daily, up to 4 times when symptoms are severe at night, for neuropathy. Side effects of Gabapentin include memory issues and tremors. She has discontinued Xarelto. Patient previously discontinued Ozempic due to severe nausea and vomiting, even at the lowest dose of 0.5 mg.    MEDICAL HISTORY:  Patient has a history of diabetes, neuropathy, and fluid on lungs.    SURGICAL HISTORY:  Patient has a heart catheterization scheduled for March. The indication for this procedure is concern about her inability to walk and burning sensation in calves and shins.    TEST RESULTS:  Patient's A1C in November was 8.5%, noted as the lowest in a while and better than the previous result of 11%. Her most recent A1C was 8.1%. She completed a CBC and BMP on Wednesday at [facility].    IMAGING:  Patient has a heart catheterization scheduled for March, with results pending.      ROS:  General: -fever, -chills, -fatigue, -weight gain, +weight loss  Eyes: -vision changes, -redness, -discharge  ENT: -ear pain, -nasal congestion, -sore throat  Cardiovascular: -chest pain, -palpitations, -lower extremity edema  Respiratory: -cough, -shortness of breath  Gastrointestinal: -abdominal pain, -nausea, -vomiting, -diarrhea, -constipation, -blood in stool  Genitourinary: -dysuria, -hematuria, -frequency  Musculoskeletal: -joint pain, +muscle pain  Skin: -rash, -lesion  Neurological: -headache, -dizziness, -numbness, -tingling, +tremors  Psychiatric: -anxiety, -depression, -sleep difficulty                Medical / Social / Family History     Past Medical History:   Diagnosis Date    ACC/AHA stage C congestive heart failure due to ischemic cardiomyopathy 03/12/2024    ACE inhibitor intolerance 03/12/2024    Alcohol dependence with uncomplicated withdrawal 07/09/2024    Broken shoulder 11/02/2024    right shoulder    CAD (coronary artery disease)  07/09/2024    Chronic deep vein thrombosis (DVT) of proximal vein of lower extremity, unspecified laterality 09/03/2024    CKD stage 3b, GFR 30-44 ml/min 07/11/2024    Diabetes mellitus, type 2     Episode of recurrent major depressive disorder, unspecified depression episode severity 09/03/2024    Factor 5 Leiden mutation, heterozygous     Familial hyperlipidemia 03/12/2024    Plan to (re) initiate crestor  Did not tolerate lipitor         Past Surgical History:   Procedure Laterality Date    REVERSE TOTAL SHOULDER ARTHROPLASTY Right 11/26/2024    Procedure: ARTHROPLASTY, SHOULDER, TOTAL, REVERSE;  Surgeon: Miguel Meyer MD;  Location: [facility];  Service: Orthopedics;  Laterality: Right;       Social History  Ms.  reports that she has been smoking cigarettes. She started smoking about 50 years ago. She has a 24.8 pack-year smoking history. She has never used smokeless tobacco. She reports that she does not currently use alcohol. She reports that she does not use drugs.    Family History  Ms.'s family history includes Cancer in her mother; Heart failure in her father.    Medications and Allergies     Medications  Outpatient Medications Marked as Taking for the 2/14/25 encounter (Office Visit) with Anabell Mendez NP   Medication Sig Dispense Refill    docusate sodium (COLACE) 100 MG capsule Take 2 capsules (200 mg total) by mouth once daily. 60 capsule 2    furosemide (LASIX) 40 MG tablet Take 1 tablet (40 mg total) by mouth once daily. 90 tablet 3    rivaroxaban (XARELTO) 20 mg Tab Take 1 tablet (20 mg total) by mouth daily with dinner or evening meal. 90 tablet 2    [DISCONTINUED] albuterol (VENTOLIN HFA) 90 mcg/actuation inhaler Inhale 2 puffs into the lungs every 6 (six) hours as needed for Wheezing. Rescue 18 g 2    [DISCONTINUED] albuterol-ipratropium (DUO-NEB) 2.5 mg-0.5 mg/3 mL nebulizer solution Take 3 mLs by nebulization every 6 (six) hours as needed for Wheezing. Rescue 75 mL 0     "[DISCONTINUED] citalopram (CELEXA) 40 MG tablet TAKE 1 TABLET ONE TIME DAILY 90 tablet 3    [DISCONTINUED] clopidogreL (PLAVIX) 75 mg tablet TAKE 1 TABLET ONE TIME DAILY 90 tablet 3    [DISCONTINUED] cyclobenzaprine (FLEXERIL) 10 MG tablet TAKE 1 TABLET TWICE DAILY AS NEEDED FOR MUSCLE SPASM(S) 60 tablet 11    [DISCONTINUED] dapagliflozin propanediol (FARXIGA) 5 mg Tab tablet Take 1 tablet (5 mg total) by mouth once daily. 90 tablet 1    [DISCONTINUED] ezetimibe (ZETIA) 10 mg tablet Take 1 tablet (10 mg total) by mouth once daily. 90 tablet 2    [DISCONTINUED] metoprolol succinate (TOPROL-XL) 25 MG 24 hr tablet Take 1 tablet (25 mg total) by mouth 2 (two) times daily. 180 tablet 2    [DISCONTINUED] rosuvastatin (CRESTOR) 20 MG tablet Take 1 tablet (20 mg total) by mouth once daily. 90 tablet 1    [DISCONTINUED] sacubitriL-valsartan (ENTRESTO) 49-51 mg per tablet Take 1 tablet by mouth 2 (two) times daily. 180 tablet 1    [DISCONTINUED] traZODone (DESYREL) 100 MG tablet TAKE 1 TABLET EVERY EVENING 90 tablet 3       Allergies  Review of patient's allergies indicates:  No Known Allergies    Physical Examination   BP (!) 143/83 (BP Location: Left arm, Patient Position: Sitting)   Pulse 88   Temp 98.1 °F (36.7 °C) (Oral)   Resp 20   Ht 5' 6" (1.676 m)   Wt 87.5 kg (193 lb)   SpO2 95%   BMI 31.15 kg/m²    Physical Exam    General: No acute distress. Well-developed. Well-nourished.  Eyes: EOMI. Sclerae anicteric.  HENT: Normocephalic. Atraumatic. Nares patent. Moist oral mucosa.  Cardiovascular: Regular rate. Regular rhythm. No murmurs. No rubs. No gallops. Normal S1, S2.  Respiratory: Normal respiratory effort. Clear to auscultation bilaterally. No rales. No rhonchi. No wheezing.  Musculoskeletal: No  obvious deformity.  Extremities: No lower extremity edema.  Neurological: Alert & oriented x3. No slurred speech. Normal gait.  Psychiatric: Normal mood. Normal affect. Good insight. Good judgment.  Skin: Warm. Dry. " No rash.           Assessment and Plan (including Health Maintenance)      Problem List  Smart Sets  Document Outside HM   :    Assessment & Plan    IMPRESSION:  - Noted improvement in patient's A1C, down to 8.5 from previous levels over 10  - Considering alternative GLP-1 agonist (e.g. Mounjaro) due to intolerance to Ozempic  - Discussed potential weaning off elevated-dose gabapentin due to concerns about memory issues and tremors  - Considered alternative neuropathy treatments (e.g. Cymbalta, Lyrica) pending gabapentin reduction  - Acknowledged link between diabetes control and neuropathy symptoms, though noted permanent changes may persist  - Recognized patient's improved mobility (arm raise, driving ability)    DIABETES:  - Explained A1C as a 3-month average of glucose levels.  - Noted improvement in patient's A1C from 11 to 8.5, the lowest it has been in a while.  - Assessed current A1C at 8.5, which is an improvement from previous readings over 10.  - Recommend waiting another month before rechecking A1C due to it being a three-month average.  - Considered prescribing a GLP-1 class medication like Mounjaro, and will submit for insurance approval.  - Scheduled follow up in 1 month for A1C check.  - Discussed the relationship between diabetes control and neuropathy symptoms.  - Provided overview of GLP-1 agonist class mechanism of action.  - Considered prescribing a GLP-1 class medication like Mounjaro for diabetes management, in addition to current insulin use.    FLUID MANAGEMENT:  - Continued current treatment of 160 mg of Lasix daily for fluid management.  - Continued Lasix 160 mg daily, to be taken early in the morning for fluid management.  - Noted patient's report of fluid on the lungs.    CARDIOVASCULAR MANAGEMENT:  - Refilled clopidogrel (Plavix) for cardiovascular management.    VASCULAR CONCERNS:  - Scheduled the patient for a heart catheterization in March with Dr. Vazquez due to concerns about walking  difficulties and burning sensations in calves and shins.  - Noted patient's report of burning sensations in calves and shins, affecting ability to walk.  - Assessed that the foot pain could be vascular-related.  - Scheduled a heart catheterization to evaluate vascular concerns.  - Noted patient's report of burning sensations in calves and shins, and pain on top of feet when lying down.    RESPIRATORY MANAGEMENT:  - Refilled albuterol.    NEUROPATHY:  - Informed about potential side effects of gabapentin, including memory issues and tremors.  - Advised the patient to consider researching alternative neuropathy medications (e.g., Cymbalta, Lyrica) to compare side effects.  - Discussed potential gradual reduction of gabapentin dosage from current 2.5-3 times daily regimen.  - Suggested gradually weaning off high-dose gabapentin (4 tablets daily) and considering alternatives like Cymbalta or Lyrica.    WEIGHT MANAGEMENT:  - Noted patient's report of weight loss.    MEDICATIONS/SUPPLEMENTS:  - Refilled Flexeril.    FOLLOW UP:  - Follow up in a few months.  - Contact the office if needed before next appointment.              Health Maintenance Due   Topic Date Due    Hepatitis C Screening  Never done    Cervical Cancer Screening  Never done    Diabetes Urine Screening  Never done    Foot Exam  Never done    Diabetic Eye Exam  Never done    HIV Screening  Never done    TETANUS VACCINE  Never done    Mammogram  Never done    Pneumococcal Vaccines (Age 50+) (1 of 2 - PCV) Never done    Colorectal Cancer Screening  Never done    Shingles Vaccine (1 of 2) Never done    RSV Vaccine (Age 60+ and Pregnant patients) (1 - Risk 60-74 years 1-dose series) Never done    Influenza Vaccine (1) Never done    COVID-19 Vaccine (1 - 2024-25 season) Never done    Hemoglobin A1c  02/27/2025       Problem List Items Addressed This Visit    None  Visit Diagnoses         Chronic obstructive pulmonary disease, unspecified COPD type         Relevant Medications    albuterol (VENTOLIN HFA) 90 mcg/actuation inhaler    albuterol-ipratropium (DUO-NEB) 2.5 mg-0.5 mg/3 mL nebulizer solution      Generalized anxiety disorder        Relevant Medications    citalopram (CELEXA) 40 MG tablet      Type 2 diabetes mellitus with hyperglycemia, with long-term current use of insulin        Relevant Medications    dapagliflozin propanediol (FARXIGA) 5 mg Tab tablet      Mixed hyperlipidemia        Relevant Medications    ezetimibe (ZETIA) 10 mg tablet    rosuvastatin (CRESTOR) 20 MG tablet      Primary hypertension        Relevant Medications    metoprolol succinate (TOPROL-XL) 25 MG 24 hr tablet            Health Maintenance Topics with due status: Not Due       Topic Last Completion Date    LDCT Lung Screen 05/01/2024    Lipid Panel 08/08/2024    High Dose Statin 02/14/2025       Future Appointments   Date Time Provider Department Center   2/26/2025  4:45 PM Re Luna PTA [facility]    3/3/2025  4:45 PM Ayse Mendoza, PT [facility]    3/5/2025  4:45 PM Re Luna PTA [facility]    3/10/2025  4:45 [facility]    3/12/2025  4:45 PM Re Luna PTA [facility]    3/17/2025  4:45 PM Re Luna PTA [facility]    3/19/2025  3:30 PM Amina Saeed, MICHAELP [facility]   3/19/2025  4:45 PM [facility]    5/1/2025  2:00 PM [facility]            Signature:  Anabell Mendez, TEJA      [address] [place] [address]    Date of encounter: 2/14/25

## 2025-03-03 ENCOUNTER — CLINICAL SUPPORT (OUTPATIENT)
Dept: REHABILITATION | Facility: HOSPITAL | Age: 60
End: 2025-03-03
Payer: MEDICARE

## 2025-03-03 DIAGNOSIS — M25.611 DECREASED ROM OF RIGHT SHOULDER: ICD-10-CM

## 2025-03-03 DIAGNOSIS — Z96.611 S/P REVERSE TOTAL SHOULDER ARTHROPLASTY, RIGHT: Primary | ICD-10-CM

## 2025-03-03 DIAGNOSIS — R26.89 DECREASED FUNCTIONAL MOBILITY: ICD-10-CM

## 2025-03-03 DIAGNOSIS — M25.511 ACUTE PAIN OF RIGHT SHOULDER: ICD-10-CM

## 2025-03-03 PROCEDURE — 97112 NEUROMUSCULAR REEDUCATION: CPT

## 2025-03-03 PROCEDURE — 97530 THERAPEUTIC ACTIVITIES: CPT

## 2025-03-03 PROCEDURE — 97140 MANUAL THERAPY 1/> REGIONS: CPT

## 2025-03-03 NOTE — PROGRESS NOTES
[facility]   Physical Therapy Treatment Note      Name: Kirk Villar  Clinic Number: [id number]    Therapy Diagnosis:   Encounter Diagnoses   Name Primary?    S/P reverse total shoulder arthroplasty, right Yes    Decreased ROM of right shoulder     Acute pain of right shoulder     Decreased functional mobility          Physician: Yina Lopez PA    Visit Date: 3/3/2025  Physician Orders: PT Eval and Treat   Medical Diagnosis from Referral: see above  Evaluation Date: 1/15/2025  Authorization Period Expiration: 1/15/2025   Plan of Care Expiration: 4/11/2025     Date of Surgery: 11/26/2024  Visit # / Visits authorized: 8 total (5/24 as of 2/1)  FOTO: 1/ 3 = 39    2/3 = 56     Precautions: Standard - 7 weeks postop      PTA Visit #: 0/5     Time In: 4:51 pm  Time Out: 5:40 pm  Total Billable Time: 49 minutes    Subjective     Pt reports: her shoulder is about the same - having her heart cath tomorrow  She was compliant with home exercise program.  Response to previous treatment: no complaints   Functional change: no change noted    Pain: 1-2/10 without meds  Location: right shoulder and upper arm    Objective    Updated 3/3/25  Passive range of motion right shoulder: 120 degrees flexion w/ pulleys, 45 degrees active with hiking, external rotation 40 degrees in supine at 45 degrees scaption, 25 degrees in adduction, internal rotation to 48 degrees in supine at 45 degrees scaption, functional internal rotation to glute med    Treatment     Kirk received the treatments listed below:      therapeutic exercises to develop strength, endurance, ROM, flexibility, posture, and core stabilization for 18 minutes including:  Upper body ergometer x 5 minutes - reverse to activate scapular stabilizers/ forward for range of motion   Pulleys x 3 minutes w/ 10 second holds  Supine cane press into flexion 4 x 5  Instructed patient to add finger crawl up wall, seated hands clasped flexion, and  internal rotation stretch with towel to home exercise program     (Not today)  Ball flexion rolls 10 x 10 second hold   Bicep curls 3# 3 x 10 - not today    manual therapy techniques: Joint mobilizations, Manual traction, Myofacial release, Soft tissue Mobilization, and Friction Massage were applied for 15 minutes, including:  Passive stretching into flexion, external rotation and internal rotation in supine (head elevated)  MFR to right upper arm musculature    neuromuscular re-education activities to improve: Balance, Coordination, Kinesthetic Sense, Proprioception, and Posture for 8 minutes. The following activities were included:  Scapular retraction/extension - red 3 x 10 (seated)  Rows w/ theraband - red 3 x 10 (seated)    Scap pnf  Adduction squeeze with wrist extension   Supine flexion with hands clasped x 20    therapeutic activities to improve functional performance for 8 minutes, including:  Cane flexion up rail x 20  Rows 2 plates 3 x 10    Hang out stretch 5 x 10 second hold       direct contact modalities after being cleared for contraindications:     supervised modalities after being cleared for contradictions:       Patient Education and Home Exercises       Education provided:   - review of home exercise program and current Plan of Care/rationale of treatment.    Written Home Exercises Provided: Patient instructed to cont prior HEP. Exercises were reviewed and Kirk was able to demonstrate them prior to the end of the session.  Kirk demonstrated good understanding of the education provided. See EMR under Patient Instructions for exercises provided during therapy sessions    Assessment     At Evaluation:  Kirk is a 59 y.o. female referred to outpatient Physical Therapy with a medical diagnosis of s/p right reverse TSR. Patient presents with postop symptoms of pain, decreased range of motion, decreased strength, decreased functional mobility and ability to perform activiites of daily living.  She was still wearing her sling. Her surgery was on 11/26/24 and she has not had any therapy to date. Instructed her to d/c sling and gave her some gentle range of motion and scapular stabilization activities to begin. She lives alone so she has had to figure out how to dress herself and get her shower. She has just been able to start washing a few dishes. Sweeping and mopping are very difficult. She did not drive herself today. Kirk has cardiac issues that have had her on disability for some years.     Current Assessment:  Kirk remains tight throughout right shoulder. She needs frequent cues to hold her head up with exercises. Her FOTO score has improved from 39 to 56. She hikes with any active flexion. Although her active range of motion is not good it has improved from evaluation. She was able to get to 120 degrees flexion with pulleys but only has about 45 degrees of active flexion with immediate upper trap substitution.  She canceled her follow up with Dr Meyer last week. She also had a heart cath yesterday so she is not certain if she will be able to come to her next physical therapy appt or not. Will continue Plan of Care and progress as tolerated. Will modify activities as needed so as to not exacerbate her cardiopulmonary issues.    Kirk Is progressing towards her goals.   Pt prognosis is Good.     Pt will continue to benefit from skilled outpatient physical therapy to address the deficits listed in the problem list box on initial evaluation, provide pt/family education and to maximize pt's level of independence in the home and community environment.     Pt's spiritual, cultural and educational needs considered and pt agreeable to plan of care and goals.     Anticipated barriers to physical therapy: none    Goals: Short Term Goals: 6 weeks  Patient will be independent with home exercise program to facilitate carryover between visits.  Patient will have passive range of motion as follows - 120  degrees flexion, 25 degrees external rotation and 45 degrees internal rotation.  Patient will be independent with dressing and driving without modification.  Patient will be able to sleep all night in bed without waking more than once due to right shoulder pain.     Long Term Goals: 12 weeks  Patient will have active range of motion as follow - 120 degrees flexion, 45-50 degrees external rotation and functional internal rotation to L1 for reaching overhead, behind head and behind back for dressing, grooming and hygiene.  Patient will have 4-4+/5 strength right shoulder/upper extremity to perform household chores and work related tasks.  Patient will place 2# dumbbell on head height shelf without pain right shoulder.  Patient will return to performing mopping, sweeping, cooking and laundry to be functionally independent.     Plan     Plan of care Certification: 1/15/2025 to 4/11/2025.     Outpatient Physical Therapy 2 times weekly for 12 weeks to include the following interventions: 02817 [therapeutic exercise], 98220 [neuromuscular re-education], 83548 [manual therapy], 17061 [therapeutic activities], 05658 [ultrasound], 65521 [unattended electrical stimulation], and 75474 [vasopneumatic device].     ELIDIA SOLIS, PT   03/03/2025

## 2025-03-04 ENCOUNTER — HOSPITAL ENCOUNTER (OUTPATIENT)
Facility: HOSPITAL | Age: 60
Discharge: HOME | End: 2025-03-04
Attending: HOSPITALIST | Admitting: HOSPITALIST
Payer: MEDICARE

## 2025-03-04 VITALS
OXYGEN SATURATION: 98 % | SYSTOLIC BLOOD PRESSURE: 149 MMHG | DIASTOLIC BLOOD PRESSURE: 78 MMHG | RESPIRATION RATE: 14 BRPM | HEART RATE: 75 BPM | TEMPERATURE: 98 F

## 2025-03-04 DIAGNOSIS — Z01.810 PRE-OPERATIVE CARDIOVASCULAR EXAMINATION: ICD-10-CM

## 2025-03-04 DIAGNOSIS — I20.9 ANGINA, CLASS III: ICD-10-CM

## 2025-03-04 DIAGNOSIS — I73.9 PERIPHERAL VASCULAR DISEASE, UNSPECIFIED: Primary | ICD-10-CM

## 2025-03-04 DIAGNOSIS — I73.9 CLAUDICATION, CLASS IV: ICD-10-CM

## 2025-03-04 LAB — GLUCOSE SERPL-MCNC: 287 MG/DL (ref 70–105)

## 2025-03-04 PROCEDURE — 25500020 PHARM REV CODE 255: Performed by: HOSPITALIST

## 2025-03-04 PROCEDURE — 99152 MOD SED SAME PHYS/QHP 5/>YRS: CPT | Performed by: HOSPITALIST

## 2025-03-04 PROCEDURE — 99153 MOD SED SAME PHYS/QHP EA: CPT | Performed by: HOSPITALIST

## 2025-03-04 PROCEDURE — C1894 INTRO/SHEATH, NON-LASER: HCPCS | Performed by: HOSPITALIST

## 2025-03-04 PROCEDURE — 93005 ELECTROCARDIOGRAM TRACING: CPT

## 2025-03-04 PROCEDURE — 93458 L HRT ARTERY/VENTRICLE ANGIO: CPT | Mod: 26,,, | Performed by: HOSPITALIST

## 2025-03-04 PROCEDURE — 27201423 OPTIME MED/SURG SUP & DEVICES STERILE SUPPLY: Performed by: HOSPITALIST

## 2025-03-04 PROCEDURE — 82962 GLUCOSE BLOOD TEST: CPT

## 2025-03-04 PROCEDURE — 63600175 PHARM REV CODE 636 W HCPCS: Performed by: HOSPITALIST

## 2025-03-04 PROCEDURE — C1760 CLOSURE DEV, VASC: HCPCS | Performed by: HOSPITALIST

## 2025-03-04 PROCEDURE — 25000003 PHARM REV CODE 250: Performed by: HOSPITALIST

## 2025-03-04 PROCEDURE — 93458 L HRT ARTERY/VENTRICLE ANGIO: CPT | Performed by: HOSPITALIST

## 2025-03-04 PROCEDURE — 99152 MOD SED SAME PHYS/QHP 5/>YRS: CPT | Mod: ,,, | Performed by: HOSPITALIST

## 2025-03-04 DEVICE — ANGIO-SEAL VIP VASCULAR CLOSURE DEVICE
Type: IMPLANTABLE DEVICE | Site: GROIN | Status: FUNCTIONAL
Brand: ANGIO-SEAL

## 2025-03-04 RX ORDER — IOPAMIDOL 755 MG/ML
INJECTION, SOLUTION INTRAVASCULAR
Status: DISCONTINUED | OUTPATIENT
Start: 2025-03-04 | End: 2025-03-04 | Stop reason: HOSPADM

## 2025-03-04 RX ORDER — ACETAMINOPHEN 325 MG/1
650 TABLET ORAL EVERY 4 HOURS PRN
Status: DISCONTINUED | OUTPATIENT
Start: 2025-03-04 | End: 2025-03-04 | Stop reason: HOSPADM

## 2025-03-04 RX ORDER — SODIUM CHLORIDE 9 MG/ML
INJECTION, SOLUTION INTRAVENOUS
Status: SHIPPED | OUTPATIENT
Start: 2025-03-04

## 2025-03-04 RX ORDER — MIDAZOLAM HYDROCHLORIDE 1 MG/ML
INJECTION INTRAMUSCULAR; INTRAVENOUS
Status: DISCONTINUED | OUTPATIENT
Start: 2025-03-04 | End: 2025-03-04 | Stop reason: HOSPADM

## 2025-03-04 RX ORDER — FENTANYL CITRATE 50 UG/ML
INJECTION, SOLUTION INTRAMUSCULAR; INTRAVENOUS
Status: DISCONTINUED | OUTPATIENT
Start: 2025-03-04 | End: 2025-03-04 | Stop reason: HOSPADM

## 2025-03-04 RX ORDER — SODIUM CHLORIDE 9 MG/ML
INJECTION, SOLUTION INTRAVENOUS ONCE
Status: DISCONTINUED | OUTPATIENT
Start: 2025-03-04 | End: 2025-03-04 | Stop reason: HOSPADM

## 2025-03-04 RX ORDER — SODIUM CHLORIDE 9 MG/ML
INJECTION, SOLUTION INTRAVENOUS CONTINUOUS
Status: DISCONTINUED | OUTPATIENT
Start: 2025-03-04 | End: 2025-03-04 | Stop reason: HOSPADM

## 2025-03-04 RX ORDER — LIDOCAINE HYDROCHLORIDE 10 MG/ML
INJECTION, SOLUTION EPIDURAL; INFILTRATION; INTRACAUDAL; PERINEURAL
Status: DISCONTINUED | OUTPATIENT
Start: 2025-03-04 | End: 2025-03-04 | Stop reason: HOSPADM

## 2025-03-04 RX ORDER — SODIUM CHLORIDE 0.9 % (FLUSH) 0.9 %
10 SYRINGE (ML) INJECTION
Status: DISCONTINUED | OUTPATIENT
Start: 2025-03-04 | End: 2025-03-04 | Stop reason: HOSPADM

## 2025-03-04 RX ORDER — ONDANSETRON 4 MG/1
8 TABLET, ORALLY DISINTEGRATING ORAL EVERY 8 HOURS PRN
Status: DISCONTINUED | OUTPATIENT
Start: 2025-03-04 | End: 2025-03-04 | Stop reason: HOSPADM

## 2025-03-04 NOTE — NURSING
Pt is unwilling to stay for full length of the bed rest time. Full length is 3 hrs with 2 hrs lay flat time. Dr. Diggs aware. Made pt aware that not staying the full length will increase the likelihood of bleeding and pt still insisted on leaving at the 2 hr mary beth. Pt was clean, dry, and intact, without hematoma on discharge.

## 2025-03-04 NOTE — Clinical Note
An angiography was performed of the right common femoral artery via hand injection with 10 mL of contrast

## 2025-03-04 NOTE — DISCHARGE SUMMARY
[facility]  Discharge Note  Short Stay    Procedure(s) (LRB):  Left heart cath (Left)      OUTCOME: Patient tolerated treatment/procedure well without complication and is now ready for discharge.    DISPOSITION: Home or Self Care    FINAL DIAGNOSIS:  non-obstructive coronary artery disease;  2/2 100% ISR OM2 stent - OM2 fills retrograde from fair collateral supply. Otherwise RCA, LCx stents widely patent.     FOLLOWUP: In clinic     DISCHARGE INSTRUCTIONS:  please follow-up in cardiology clinic at your scheduled appointments. Please do not lift anything more than 10 lbs for next 7-10 days.     TIME SPENT ON DISCHARGE: 20 minutes

## 2025-03-04 NOTE — Clinical Note
An angiography of the  right femoral artery was performed to evaluate for the placement of a closure device. 10CC hand injection of contrast through sheath

## 2025-03-04 NOTE — DISCHARGE INSTRUCTIONS
Keep dressing to right groin in place for 24 hours. After 24 hours remove and gently clean site with mild soap and water then pat dry. Cover with band-aid. If bleeding from site apply pressure for 10-15 minutes. If bleeding continues or if there is excessive bruising or swelling to site go to closest ER for evaluation.  Do not submerge site in water for 72 hours.  No heavy lifting or other strenuous activities for 3-5 days.  No driving for 24 hours.  Drink plenty of water over next 48 hours to help flush kidneys.  No smoking!  Keep all follow up appointments.  Take all medications as ordered. Notify your doctor immediately if you cannot afford your medications!  Thank you for allowing us at [facility] to care for you today!

## 2025-03-17 ENCOUNTER — TELEPHONE (OUTPATIENT)
Dept: REHABILITATION | Facility: HOSPITAL | Age: 60
End: 2025-03-17
Payer: MEDICARE

## 2025-03-17 NOTE — TELEPHONE ENCOUNTER
Patient was called today at 3:35 pm to see if she would like to schedule some more physical therapy appts as she canceled her last one. There was no answer and the voice mailbox was full so was unable to leave a message.

## 2025-03-25 DIAGNOSIS — E11.65 TYPE 2 DIABETES MELLITUS WITH HYPERGLYCEMIA, WITH LONG-TERM CURRENT USE OF INSULIN: ICD-10-CM

## 2025-03-25 DIAGNOSIS — Z79.4 TYPE 2 DIABETES MELLITUS WITH HYPERGLYCEMIA, WITH LONG-TERM CURRENT USE OF INSULIN: ICD-10-CM

## 2025-03-25 DIAGNOSIS — E78.2 MIXED HYPERLIPIDEMIA: ICD-10-CM

## 2025-03-25 DIAGNOSIS — J44.9 CHRONIC OBSTRUCTIVE PULMONARY DISEASE, UNSPECIFIED COPD TYPE: ICD-10-CM

## 2025-03-26 RX ORDER — DAPAGLIFLOZIN 5 MG/1
5 TABLET, FILM COATED ORAL
Qty: 100 TABLET | Refills: 2 | Status: SHIPPED | OUTPATIENT
Start: 2025-03-26

## 2025-03-26 RX ORDER — GABAPENTIN 800 MG/1
TABLET ORAL
Qty: 270 TABLET | Refills: 4 | Status: SHIPPED | OUTPATIENT
Start: 2025-03-26

## 2025-03-26 RX ORDER — ALBUTEROL SULFATE 90 UG/1
INHALANT RESPIRATORY (INHALATION)
Qty: 54 G | Refills: 3 | Status: SHIPPED | OUTPATIENT
Start: 2025-03-26

## 2025-03-26 RX ORDER — IPRATROPIUM BROMIDE AND ALBUTEROL SULFATE 2.5; .5 MG/3ML; MG/3ML
SOLUTION RESPIRATORY (INHALATION)
Qty: 90 ML | Refills: 2 | Status: SHIPPED | OUTPATIENT
Start: 2025-03-26

## 2025-03-26 RX ORDER — ROSUVASTATIN CALCIUM 20 MG/1
20 TABLET, COATED ORAL
Qty: 100 TABLET | Refills: 2 | Status: SHIPPED | OUTPATIENT
Start: 2025-03-26

## 2025-03-26 RX ORDER — SACUBITRIL AND VALSARTAN 49; 51 MG/1; MG/1
1 TABLET, FILM COATED ORAL 2 TIMES DAILY
Qty: 200 TABLET | Refills: 2 | Status: SHIPPED | OUTPATIENT
Start: 2025-03-26

## 2025-04-01 ENCOUNTER — OFFICE VISIT (OUTPATIENT)
Dept: CARDIOLOGY | Facility: CLINIC | Age: 60
End: 2025-04-01
Payer: MEDICARE

## 2025-04-01 ENCOUNTER — HOSPITAL ENCOUNTER (OUTPATIENT)
Dept: RADIOLOGY | Facility: HOSPITAL | Age: 60
Discharge: HOME | End: 2025-04-01
Attending: HOSPITALIST
Payer: MEDICARE

## 2025-04-01 VITALS
OXYGEN SATURATION: 99 % | SYSTOLIC BLOOD PRESSURE: 130 MMHG | WEIGHT: 197 LBS | DIASTOLIC BLOOD PRESSURE: 72 MMHG | BODY MASS INDEX: 31.8 KG/M2

## 2025-04-01 DIAGNOSIS — Z01.812 PRE-OPERATIVE LABORATORY EXAMINATION: ICD-10-CM

## 2025-04-01 DIAGNOSIS — I73.9 PERIPHERAL VASCULAR DISEASE, UNSPECIFIED: ICD-10-CM

## 2025-04-01 DIAGNOSIS — R60.9 EDEMA, UNSPECIFIED TYPE: ICD-10-CM

## 2025-04-01 DIAGNOSIS — I73.9 CLAUDICATION, CLASS IV: ICD-10-CM

## 2025-04-01 DIAGNOSIS — I73.9 CLAUDICATION: Primary | ICD-10-CM

## 2025-04-01 LAB — CREAT SERPL-MCNC: 1.3 MG/DL (ref 0.6–1.3)

## 2025-04-01 PROCEDURE — 25500020 PHARM REV CODE 255: Performed by: HOSPITALIST

## 2025-04-01 PROCEDURE — 99999 PR PBB SHADOW E&M-EST. PATIENT-LVL III: CPT | Mod: PBBFAC,,, | Performed by: HOSPITALIST

## 2025-04-01 PROCEDURE — 99215 OFFICE O/P EST HI 40 MIN: CPT | Mod: S$PBB,,, | Performed by: HOSPITALIST

## 2025-04-01 PROCEDURE — 3008F BODY MASS INDEX DOCD: CPT | Mod: CPTII,,, | Performed by: HOSPITALIST

## 2025-04-01 PROCEDURE — 75635 CT ANGIO ABDOMINAL ARTERIES: CPT | Mod: TC

## 2025-04-01 PROCEDURE — 75635 CT ANGIO ABDOMINAL ARTERIES: CPT | Mod: 26,,, | Performed by: RADIOLOGY

## 2025-04-01 PROCEDURE — 3075F SYST BP GE 130 - 139MM HG: CPT | Mod: CPTII,,, | Performed by: HOSPITALIST

## 2025-04-01 PROCEDURE — 99213 OFFICE O/P EST LOW 20 MIN: CPT | Mod: PBBFAC,25 | Performed by: HOSPITALIST

## 2025-04-01 PROCEDURE — 3078F DIAST BP <80 MM HG: CPT | Mod: CPTII,,, | Performed by: HOSPITALIST

## 2025-04-01 PROCEDURE — 4010F ACE/ARB THERAPY RXD/TAKEN: CPT | Mod: CPTII,,, | Performed by: HOSPITALIST

## 2025-04-01 PROCEDURE — 82565 ASSAY OF CREATININE: CPT

## 2025-04-01 RX ORDER — SODIUM CHLORIDE 0.9 % (FLUSH) 0.9 %
10 SYRINGE (ML) INJECTION
OUTPATIENT
Start: 2025-04-28

## 2025-04-01 RX ORDER — SODIUM CHLORIDE 9 MG/ML
INJECTION, SOLUTION INTRAVENOUS ONCE
OUTPATIENT
Start: 2025-04-28

## 2025-04-01 RX ORDER — IOPAMIDOL 755 MG/ML
100 INJECTION, SOLUTION INTRAVASCULAR
Status: COMPLETED | OUTPATIENT
Start: 2025-04-01 | End: 2025-04-01

## 2025-04-01 RX ADMIN — IOPAMIDOL 125 ML: 755 INJECTION, SOLUTION INTRAVENOUS at 10:04

## 2025-04-01 NOTE — PROGRESS NOTES
CARDIOVASCULAR CONSULTATION        REASON FOR CONSULT:   Kirk Villar is a 59 y.o. female who presents for   Chief Complaint   Patient presents with    Results     CT scan    Edema     Bilateral lower extremity if not laying down          HISTORY OF PRESENT ILLNESS:   59 y.o. female who  has a past medical history of ACC/AHA stage C congestive heart failure due to ischemic cardiomyopathy, ACE inhibitor intolerance, Alcohol dependence with uncomplicated withdrawal, Broken shoulder, CAD (coronary artery disease), Chronic deep vein thrombosis (DVT) of proximal vein of lower extremity, unspecified laterality, CKD stage 3b, GFR 30-44 ml/min, Diabetes mellitus, type 2, Episode of recurrent major depressive disorder, unspecified depression episode severity, Factor 5 Leiden mutation, heterozygous, and Familial hyperlipidemia.    Today we discussed the patient's stable CAD and severe claudication symptoms. We reviewed her CTA w/ runoff in all projections/all series/all recons (together). She is s/p LHC and cor angio (see below) that showed prior RCA stent patent, prior LCx stent patent. Prior 90% ISR OM2 stent now 100% 'd though OM2 fills retrograde with fair to good collateral supply.    From my last note - and from our discussion last visit,  the patient is complaining severe bilateral claudication - has hx of PAD as well w/ known stents (x4, 2 in each leg per patient) - her most recent arterial doppler showed normal ABIs - we discussed cardiac ischemic evaluation w/ plan to perform CTA w/ runoff bilaterally vs peripheral angio w/ runoff given prior stents if claudication persists (at follow-up visit).       Mainly, we discussed the severity of the patients symptoms and the (increasing in frequency and severity - now occurs daily, all day, whenever ambulating)- functionally-limiting aspect of her PAD.    See a/p.       Results for orders placed during the hospital encounter of 03/04/25    Cardiac  catheterization    Conclusion    The pre-procedure left ventricular end diastolic pressure was 24.    The estimated blood loss was <50 mL.    There was non-obstructive coronary artery disease.    Prior RCA stent patent, prior LCx stent patent. Prior 90% ISR OM2 stent now 100% 'd though OM2 feels retrograde with fair to good collateral supply.    The procedure log was documented by Documenter: Niurka Rodriguez, SHAREE; Aníbal Paiz RN and verified by Ramo Diggs MD.    Date: 3/4/2025  Time: 9:41 AM    PAST MEDICAL HISTORY:     Past Medical History:   Diagnosis Date    ACC/AHA stage C congestive heart failure due to ischemic cardiomyopathy 03/12/2024    ACE inhibitor intolerance 03/12/2024    Alcohol dependence with uncomplicated withdrawal 07/09/2024    Broken shoulder 11/02/2024    right shoulder    CAD (coronary artery disease) 07/09/2024    Chronic deep vein thrombosis (DVT) of proximal vein of lower extremity, unspecified laterality 09/03/2024    CKD stage 3b, GFR 30-44 ml/min 07/11/2024    Diabetes mellitus, type 2     Episode of recurrent major depressive disorder, unspecified depression episode severity 09/03/2024    Factor 5 Leiden mutation, heterozygous     Familial hyperlipidemia 03/12/2024    Plan to (re) initiate crestor  Did not tolerate lipitor         PAST SURGICAL HISTORY:     Past Surgical History:   Procedure Laterality Date    LEFT HEART CATHETERIZATION Left 3/4/2025    Procedure: Left heart cath;  Surgeon: Ramo Diggs MD;  Location: [facility] CATH LAB;  Service: Cardiology;  Laterality: Left;    REVERSE TOTAL SHOULDER ARTHROPLASTY Right 11/26/2024    Procedure: ARTHROPLASTY, SHOULDER, TOTAL, REVERSE;  Surgeon: Miguel Meyer MD;  Location: [facility] ORTHO OR;  Service: Orthopedics;  Laterality: Right;       ALLERGIES AND MEDICATION:   Review of patient's allergies indicates:  No Known Allergies     Medication List            Accurate as of April 1, 2025  4:06 PM. If you have  any questions, ask your nurse or doctor.                CONTINUE taking these medications      albuterol 90 mcg/actuation inhaler  Commonly known as: PROVENTIL/VENTOLIN HFA  USE 2 INHALATIONS BY MOUTH EVERY 6 HOURS AS NEEDED FOR WHEEZING  FOR RESCUE     albuterol-ipratropium 2.5 mg-0.5 mg/3 mL nebulizer solution  Commonly known as: DUO-NEB  TAKE 1 VIAL VIA NEBULIZER EVERY  6 HOURS AS NEEDED FOR WHEEZING.  FOR RESCUE     blood sugar diagnostic Strp  Commonly known as: ACCU-CHEK GUIDE TEST STRIPS  1 strip by Misc.(Non-Drug; Combo Route) route 3 (three) times daily.     citalopram 40 MG tablet  Commonly known as: CeleXA  Take 1 tablet (40 mg total) by mouth once daily.     clopidogreL 75 mg tablet  Commonly known as: PLAVIX  Take 1 tablet (75 mg total) by mouth once daily.     cyclobenzaprine 10 MG tablet  Commonly known as: FLEXERIL  Take 1 tablet (10 mg total) by mouth 3 (three) times daily as needed for Muscle spasms.     docusate sodium 100 MG capsule  Commonly known as: COLACE  Take 2 capsules (200 mg total) by mouth once daily.     ENTRESTO 49-51 mg per tablet  Generic drug: sacubitriL-valsartan  TAKE 1 TABLET BY MOUTH TWICE  DAILY     ezetimibe 10 mg tablet  Commonly known as: ZETIA  Take 1 tablet (10 mg total) by mouth once daily.     FARXIGA 5 mg Tab tablet  Generic drug: dapagliflozin propanediol  TAKE 1 TABLET BY MOUTH ONCE  DAILY     furosemide 40 MG tablet  Commonly known as: LASIX  Take 1 tablet (40 mg total) by mouth once daily.     gabapentin 800 MG tablet  Commonly known as: NEURONTIN  TAKE 1 TABLET BY MOUTH 4 TIMES  DAILY AS NEEDED FOR NEUROPATHY     metoprolol succinate 25 MG 24 hr tablet  Commonly known as: TOPROL-XL  Take 1 tablet (25 mg total) by mouth 2 (two) times daily.     rivaroxaban 20 mg Tab  Commonly known as: XARELTO  Take 1 tablet (20 mg total) by mouth daily with dinner or evening meal.     rosuvastatin 20 MG tablet  Commonly known as: CRESTOR  TAKE 1 TABLET BY MOUTH ONCE  DAILY    "  traZODone 100 MG tablet  Commonly known as: DESYREL  Take 1 tablet (100 mg total) by mouth every evening.              SOCIAL HISTORY:   Social History[1]    FAMILY HISTORY:     Family History   Problem Relation Name Age of Onset    Cancer Mother      Heart failure Father         REVIEW OF SYSTEMS:       Cardiology focused 12-point ROS otherwise unremarkable except for as mentioned in HPI and A/P.   Pertinent Positives and Negatives documented herein.       PHYSICAL EXAM:     Vitals:    04/01/25 1510   BP: 130/72   Pulse: (P) 85    Body mass index is 31.8 kg/m².  Weight: 89.4 kg (197 lb)         Gen: NAD  HEENT: NC/AT, MMM  Chest: normal wob  CV: RRR, no m/g/r  Ext: mild/trace edema of BLEs  Skin: no rash  Psych: AMAA  Neuro: [facility]I      DATA:     Laboratory:  CBC:  Recent Labs   Lab 11/26/24  1302 11/27/24  0501 02/12/25  1600   WBC 7.97 8.82 8.76   Hemoglobin 10.0 L 8.9 L 11.8 L   Hematocrit 33.2 L 29.7 L 40.2   Platelet Count 297 275 325       CHEMISTRIES:  Recent Labs   Lab 07/10/24  0359 07/11/24  0454 11/26/24  1302 11/27/24  0501 02/12/25  1600   Glucose 397 H   < > 253 H 324 H 293 H   Sodium 135 L   < > 141 140 137   Potassium 4.5   < > 3.8 4.3 4.8   BUN 28 H   < > 13 16 19   Creatinine 1.45 H   < > 1.05 H 1.27 H 1.35 H   Calcium 8.9   < > 8.6 8.4 9.6   Magnesium 2.0  --   --   --   --     < > = values in this interval not displayed.       CARDIAC BIOMARKERS:      No results found for: "BNP"    COAGS:  Recent Labs   Lab 11/22/24  1031   INR 1.16       LIPIDS/LFTS:  Recent Labs   Lab 11/30/22  1400 07/08/24  2040 07/12/24  0506 08/08/24  1130 11/27/24  0501   Cholesterol 348 H  --   --  192  --    Triglycerides 449 H  --   --  201 H  --    HDL Cholesterol 45  --   --  61 H  --    LDL Calculated  --   --   --  91  --    Non-  --   --  131  --    AST 8 L   < > 10 L 19 12   ALT 20   < > 17 19 <7    < > = values in this interval not displayed.       Hemoglobin A1C   Date Value Ref Range Status   11/27/2024 " 8.5 (H) <=7.0 % Final     Comment:       Normal:               <5.7%  Pre-Diabetic:       5.7% to 6.4%  Diabetic:             >6.4%  Diabetic Goal:     <7%   07/09/2024 10.0 (H) 4.5 - 6.6 % Final     Comment:       Normal:               <5.7%  Pre-Diabetic:       5.7% to 6.4%  Diabetic:             >6.4%  Diabetic Goal:     <7%   11/30/2022 11.6 (H) 4.5 - 6.6 % Final     Comment:       Normal:               <5.7%  Pre-Diabetic:       5.7% to 6.4%  Diabetic:             >6.4%  Diabetic Goal:     <7%       TSH  Recent Labs   Lab 11/30/22  1400 08/08/24  1130   TSH 0.361 1.300       The 10-year ASCVD risk score (Laura WAGNER, et al., 2019) is: 17.3%    Values used to calculate the score:      Age: 59 years      Sex: Female      Is Non- : No      Diabetic: Yes      Tobacco smoker: Yes      Systolic Blood Pressure: 130 mmHg      Is BP treated: Yes      HDL Cholesterol: 61 mg/dL      Total Cholesterol: 192 mg/dL     IMAGING  No orders to display       ASSESSMENT AND PLAN     Problem List[2]  Problem Noted   Claudication, Class IV 11/22/2024    Plan R CFA access for planned LLE PAD intervention. There is overtly a mid LSFA lesion and high suspicion for L JACOB and/or CFA lesion(s). Do not plan to treat infrapopliteal disease, but did request authorization should single vessel runoff etc be present on invasive angiography. There is suggestion of AT and PER disease bilaterally that appears long/diffuse. That said, plan to treat inflow disease first and foremost and only proceed w/ below knee intervention if limb threatening. See notes below:    Claudication per patient has not only persisted, but worsened to the point that she is having difficulty ambulating (overtly, as in, the pain in her thighs/calves is so severe she cannot walk a block without stopping due to pain. She states both legs 'hurt' but, prior to discussing CTA w/ runoff, she noted her LLE was definitely 'worse.' This does correlate w/ her  CTA w/ runoff, which showed bilateral patent SFA stents (1 in each SFA). suggestion of L JACOB/EIA disease that is not plainly extra-luminal; axial thins of the mid L JACOB, L CFA, and LSFA distal to the stent (all) show 20-80mm of >50% reduction in cross sectional area; feel R CFA access, w/ plan for up and over approach to L JACOB in absence of R JACOB/EIA disease (less likely given less plaque burden, but there is a R JACOB lesion that is difficult to quantify on CTA, but cannot rule out RLE inflow disease which we would fix on the 'way in' or 'way out' depending on severity and anatomy  (these findings are not all reported on CT as is difficult to define extrinsic vs intrinsic remodeling due to artifact from medial calcific disease and radiology, as is often stated when discussed, is not reading for procedural planning). These findings were subsequently corroborated on coronal, sagittal, MIP, 3D recons. All images, all series, all projections, all recons personally reviewed by myself both in office and with the patient.     Heart Failure With Mid-Range Ejection Fraction (Hfmef) 3/12/2024    Partially recovered EF; ischemic cardiomyopathy; 25%->45% following ACS and PCI in [facility]  Plan: initiate/titrate GDMT      Uncontrolled Type 2 Diabetes Mellitus With Hyperglycemia 3/12/2024   Acc/Aha Stage C Congestive Heart Failure Due to Ischemic Cardiomyopathy 3/12/2024   Familial Hyperlipidemia 3/12/2024    Plan to (re) initiate crestor  Did not tolerate lipitor     Angina, Class III (Resolved) 2/3/2025    Is now having daily severe HEAD that is new/worse since last visit - both wrt frequency and severity; patient is now having symptoms even w/ minimal exertion or at rest. Has 3 coronary stents from prior - 2 patent, 1 thought to be severely stenosed (90% per patient).   -discussed lhc and cor angio for further ischemic eval - patient amenable    Risks, benefits and alternatives of the catheterization procedure were discussed  with the patient.The risks of coronary angiography include but are not limited to: bleeding, infection, death, heart attack, arrhythmia, kidney injury or failure, potential need for dialysis, allergic reactions, stroke, need for emergency surgery, hematoma, pseudoaneurysm etc.  Should stenting be indicated, the patient has agreed to dual anti-platelet therapy for 1-consecutive year with a drug-eluting stent and a minimum of 1-month with the use of a bare metal stent. Additionally, pt is aware that non-compliance is likely to result in stent clotting with heart attack, heart failure, and/or death  The risks of moderate sedation include hypotension, respiratory depression, arrhythmias, bronchospasm, and death. Informed consent was obtained and the  patient is agreeable to proceed with the procedure. Consent was placed on the chart.       Encounter for Cardiac Risk Counseling (Resolved) 11/22/2024    RUE injury, planned TSR Tues. Per patient. No cp or anginal complaints w/ ADLs/iADLs; we discussed given her surgery date is next Tuesday, somewhat limited (in terms of sensitivity), though her CVRI suggests she is low or intermediate risk, depending on her pre-op Hgb level (pending). Either way, feel is reasonable to proceed from risk:benefit standpoint, as there is significant mortality/morbidity/QOL etc if not repaired-  patient understands; agrees she would like to proceed, feel is reasonable, ultimately defer to  choice - happy to discuss if concern    0  >75   1  Cv dz hx  0  angina w ADLs/iADLs  0  vasc surg   0  emergent surg  ?  Hgb <12       0-1 pt: low  2-3 pt: intermed  >3 pt: high     CVRI, JACC 2019, validated, powered for discriminatory fx (30d mortality); superior c-statistic to NSQIP, RCRI, and all risk models prior to 2019           No orders of the defined types were placed in this encounter.      There are no diagnoses linked to this encounter.              This note was dictated with the  help of speech recognition software.  There might be un-intended errors and/or substitutions.                       [1]   Social History  Socioeconomic History    Marital status: Unknown   Tobacco Use    Smoking status: Every Day     Current packs/day: 0.50     Average packs/day: 0.5 packs/day for 50.2 years (24.9 ttl pk-yrs)     Types: Cigarettes     Start date: 1975     Last attempt to quit: 12/1/2024    Smokeless tobacco: Never    Tobacco comments:     Patient states 4 singles a day   Substance and Sexual Activity    Alcohol use: Not Currently     Comment: occasionally    Drug use: Never    Sexual activity: Not Currently     Social Drivers of Health     Financial Resource Strain: Low Risk  (2/13/2025)    Overall Financial Resource Strain (CARDIA)     Difficulty of Paying Living Expenses: Not very hard   Food Insecurity: Food Insecurity Present (2/13/2025)    Hunger Vital Sign     Worried About Running Out of Food in the Last Year: Sometimes true     Ran Out of Food in the Last Year: Sometimes true   Transportation Needs: No Transportation Needs (2/13/2025)    PRAPARE - Transportation     Lack of Transportation (Medical): No     Lack of Transportation (Non-Medical): No   Physical Activity: Inactive (2/13/2025)    Exercise Vital Sign     Days of Exercise per Week: 0 days     Minutes of Exercise per Session: 0 min   Stress: No Stress Concern Present (2/13/2025)    [employer] [place] [employer] - Occupational Stress Questionnaire     Feeling of Stress : Only a little   Housing Stability: High Risk (2/13/2025)    Housing Stability Vital Sign     Unable to Pay for Housing in the Last Year: Yes     Number of Times Moved in the Last Year: 0     Homeless in the Last Year: No   [2]   Patient Active Problem List  Diagnosis    Heart failure with mid-range ejection fraction (HFmEF)    Uncontrolled type 2 diabetes mellitus with hyperglycemia    ACC/AHA stage C congestive heart failure due to ischemic cardiomyopathy     Familial hyperlipidemia    Acute on chronic HFrEF (heart failure with reduced ejection fraction)    Factor 5 Leiden mutation, heterozygous    Alcohol dependence with uncomplicated withdrawal    CAD (coronary artery disease)    CKD stage 3b, GFR 30-44 ml/min    Chronic deep vein thrombosis (DVT) of proximal vein of lower extremity, unspecified laterality    Episode of recurrent major depressive disorder, unspecified depression episode severity    Claudication, class IV    Closed fracture of left shoulder    COPD exacerbation    S/P reverse total shoulder arthroplasty, right    Decreased ROM of right shoulder    Acute pain of right shoulder    Decreased functional mobility

## 2025-04-02 ENCOUNTER — TELEPHONE (OUTPATIENT)
Dept: CARDIOLOGY | Facility: CLINIC | Age: 60
End: 2025-04-02
Payer: MEDICARE

## 2025-04-02 NOTE — TELEPHONE ENCOUNTER
VM is full. Attempted to contact patient to go over cath instructions that was given at appt 4/1/25.  -HW

## 2025-04-12 DIAGNOSIS — J44.9 CHRONIC OBSTRUCTIVE PULMONARY DISEASE, UNSPECIFIED COPD TYPE: ICD-10-CM

## 2025-04-14 RX ORDER — IPRATROPIUM BROMIDE AND ALBUTEROL SULFATE 2.5; .5 MG/3ML; MG/3ML
SOLUTION RESPIRATORY (INHALATION)
Qty: 270 ML | Refills: 2 | Status: SHIPPED | OUTPATIENT
Start: 2025-04-14 | End: 2025-04-17

## 2025-04-16 DIAGNOSIS — J44.9 CHRONIC OBSTRUCTIVE PULMONARY DISEASE, UNSPECIFIED COPD TYPE: ICD-10-CM

## 2025-04-17 RX ORDER — IPRATROPIUM BROMIDE AND ALBUTEROL SULFATE 2.5; .5 MG/3ML; MG/3ML
SOLUTION RESPIRATORY (INHALATION)
Qty: 810 ML | Refills: 4 | Status: SHIPPED | OUTPATIENT
Start: 2025-04-17

## 2025-04-28 ENCOUNTER — TELEPHONE (OUTPATIENT)
Dept: CARDIOLOGY | Facility: CLINIC | Age: 60
End: 2025-04-28
Payer: MEDICARE

## 2025-04-28 NOTE — TELEPHONE ENCOUNTER
----- Message from Chetna sent at 4/25/2025 12:32 PM CDT -----  Regarding: PROC APPT  Pt was for appt in cath lab on Mon and cannot make it due to sickness. She stated she spoke to cath lab and told them. She wants to res this appt. Who Called: Kirk Mazariegos's Preferred Phone Number on File: [phone] Best Call Back Number, if different:Additional Information:

## 2025-04-28 NOTE — TELEPHONE ENCOUNTER
Returned call to patient and rescheduled her peripheral cath for 5/9/25. She is aware cath lab will call on 5/8/25 with time for procedure.  -HW

## 2025-05-08 ENCOUNTER — TELEPHONE (OUTPATIENT)
Dept: CARDIOLOGY | Facility: CLINIC | Age: 60
End: 2025-05-08
Payer: MEDICARE

## 2025-05-08 NOTE — TELEPHONE ENCOUNTER
I talked to one of the nurses that usually helps Dr. Diggs and she is going to get it rescheduled and talk to the patient about choosing another day.

## 2025-05-08 NOTE — TELEPHONE ENCOUNTER
Pt. Called r/s peripheral 5-9-25 to 5-29-25 advised ok to take usual medications morning of procedure hold diabetic meds if taking continue plavix and xarelto per Dr. Diggs. Pt. Voiced understanding.

## 2025-05-27 ENCOUNTER — OFFICE VISIT (OUTPATIENT)
Dept: FAMILY MEDICINE | Facility: CLINIC | Age: 60
End: 2025-05-27
Payer: MEDICARE

## 2025-05-27 VITALS
RESPIRATION RATE: 16 BRPM | HEIGHT: 66 IN | HEART RATE: 78 BPM | WEIGHT: 208.88 LBS | TEMPERATURE: 98 F | DIASTOLIC BLOOD PRESSURE: 80 MMHG | OXYGEN SATURATION: 97 % | BODY MASS INDEX: 33.57 KG/M2 | SYSTOLIC BLOOD PRESSURE: 140 MMHG

## 2025-05-27 DIAGNOSIS — Z79.4 TYPE 2 DIABETES MELLITUS WITH HYPERGLYCEMIA, WITH LONG-TERM CURRENT USE OF INSULIN: ICD-10-CM

## 2025-05-27 DIAGNOSIS — J30.2 SEASONAL ALLERGIES: ICD-10-CM

## 2025-05-27 DIAGNOSIS — E78.2 MIXED HYPERLIPIDEMIA: ICD-10-CM

## 2025-05-27 DIAGNOSIS — R60.0 BILATERAL LOWER EXTREMITY EDEMA: ICD-10-CM

## 2025-05-27 DIAGNOSIS — Z11.59 ENCOUNTER FOR HEPATITIS C SCREENING TEST FOR LOW RISK PATIENT: ICD-10-CM

## 2025-05-27 DIAGNOSIS — E11.65 TYPE 2 DIABETES MELLITUS WITH HYPERGLYCEMIA, WITH LONG-TERM CURRENT USE OF INSULIN: ICD-10-CM

## 2025-05-27 DIAGNOSIS — Z11.4 SCREENING FOR HIV WITHOUT PRESENCE OF RISK FACTORS: ICD-10-CM

## 2025-05-27 DIAGNOSIS — Z00.00 ROUTINE CHECK-UP: Primary | ICD-10-CM

## 2025-05-27 DIAGNOSIS — I10 PRIMARY HYPERTENSION: ICD-10-CM

## 2025-05-27 LAB
ALBUMIN SERPL BCP-MCNC: 3.5 G/DL (ref 3.4–4.8)
ALBUMIN/GLOB SERPL: 0.9 {RATIO}
ALP SERPL-CCNC: 65 U/L (ref 40–150)
ALT SERPL W P-5'-P-CCNC: 16 U/L
ANION GAP SERPL CALCULATED.3IONS-SCNC: 14 MMOL/L (ref 7–16)
AST SERPL W P-5'-P-CCNC: 34 U/L (ref 11–45)
BASOPHILS # BLD AUTO: 0.09 K/UL (ref 0–0.2)
BASOPHILS NFR BLD AUTO: 1.3 % (ref 0–1)
BILIRUB SERPL-MCNC: 0.5 MG/DL
BUN SERPL-MCNC: 37 MG/DL (ref 10–20)
BUN/CREAT SERPL: 27 (ref 6–20)
CALCIUM SERPL-MCNC: 9.8 MG/DL (ref 8.4–10.2)
CHLORIDE SERPL-SCNC: 99 MMOL/L (ref 98–107)
CHOLEST SERPL-MCNC: 196 MG/DL
CHOLEST/HDLC SERPL: 3.4 {RATIO}
CO2 SERPL-SCNC: 30 MMOL/L (ref 23–31)
CREAT SERPL-MCNC: 1.36 MG/DL (ref 0.55–1.02)
CREAT UR-MCNC: 43 MG/DL (ref 15–325)
DIFFERENTIAL METHOD BLD: ABNORMAL
EGFR (NO RACE VARIABLE) (RUSH/TITUS): 44 ML/MIN/1.73M2
EOSINOPHIL # BLD AUTO: 0.22 K/UL (ref 0–0.5)
EOSINOPHIL NFR BLD AUTO: 3.3 % (ref 1–4)
ERYTHROCYTE [DISTWIDTH] IN BLOOD BY AUTOMATED COUNT: 15.4 % (ref 11.5–14.5)
EST. AVERAGE GLUCOSE BLD GHB EST-MCNC: 217 MG/DL
GLOBULIN SER-MCNC: 3.8 G/DL (ref 2–4)
GLUCOSE SERPL-MCNC: 155 MG/DL (ref 82–115)
HBA1C MFR BLD HPLC: 9.2 %
HCT VFR BLD AUTO: 34.1 % (ref 38–47)
HCV AB SER QL: NORMAL
HDLC SERPL-MCNC: 57 MG/DL (ref 35–60)
HGB BLD-MCNC: 11 G/DL (ref 12–16)
HIV 1+O+2 AB SERPL QL: NORMAL
IMM GRANULOCYTES # BLD AUTO: 0.02 K/UL (ref 0–0.04)
IMM GRANULOCYTES NFR BLD: 0.3 % (ref 0–0.4)
LDLC SERPL CALC-MCNC: 78 MG/DL
LDLC/HDLC SERPL: 1.4 {RATIO}
LYMPHOCYTES # BLD AUTO: 1.62 K/UL (ref 1–4.8)
LYMPHOCYTES NFR BLD AUTO: 24.3 % (ref 27–41)
MCH RBC QN AUTO: 29.6 PG (ref 27–31)
MCHC RBC AUTO-ENTMCNC: 32.3 G/DL (ref 32–36)
MCV RBC AUTO: 91.9 FL (ref 80–96)
MICROALBUMIN UR-MCNC: 69.4 MG/DL
MICROALBUMIN/CREAT RATIO PNL UR: 1614 MG/G (ref 0–30)
MONOCYTES # BLD AUTO: 0.57 K/UL (ref 0–0.8)
MONOCYTES NFR BLD AUTO: 8.5 % (ref 2–6)
MPC BLD CALC-MCNC: 10.7 FL (ref 9.4–12.4)
NEUTROPHILS # BLD AUTO: 4.15 K/UL (ref 1.8–7.7)
NEUTROPHILS NFR BLD AUTO: 62.3 % (ref 53–65)
NONHDLC SERPL-MCNC: 139 MG/DL
NRBC # BLD AUTO: 0 X10E3/UL
NRBC, AUTO (.00): 0 %
PLATELET # BLD AUTO: 259 K/UL (ref 150–400)
POTASSIUM SERPL-SCNC: 4.4 MMOL/L (ref 3.5–5.1)
PROT SERPL-MCNC: 7.3 G/DL (ref 5.8–7.6)
RBC # BLD AUTO: 3.71 M/UL (ref 4.2–5.4)
SODIUM SERPL-SCNC: 139 MMOL/L (ref 136–145)
TRIGL SERPL-MCNC: 306 MG/DL (ref 37–140)
TSH SERPL DL<=0.005 MIU/L-ACNC: 0.6 UIU/ML (ref 0.35–4.94)
VLDLC SERPL-MCNC: 61 MG/DL
WBC # BLD AUTO: 6.67 K/UL (ref 4.5–11)

## 2025-05-27 PROCEDURE — 80053 COMPREHEN METABOLIC PANEL: CPT | Mod: ,,, | Performed by: CLINICAL MEDICAL LABORATORY

## 2025-05-27 PROCEDURE — 3079F DIAST BP 80-89 MM HG: CPT | Mod: CPTII,,, | Performed by: NURSE PRACTITIONER

## 2025-05-27 PROCEDURE — 87389 HIV-1 AG W/HIV-1&-2 AB AG IA: CPT | Mod: ,,, | Performed by: CLINICAL MEDICAL LABORATORY

## 2025-05-27 PROCEDURE — 85025 COMPLETE CBC W/AUTO DIFF WBC: CPT | Mod: ,,, | Performed by: CLINICAL MEDICAL LABORATORY

## 2025-05-27 PROCEDURE — 99213 OFFICE O/P EST LOW 20 MIN: CPT | Mod: ,,, | Performed by: NURSE PRACTITIONER

## 2025-05-27 PROCEDURE — 3066F NEPHROPATHY DOC TX: CPT | Mod: CPTII,,, | Performed by: NURSE PRACTITIONER

## 2025-05-27 PROCEDURE — 3008F BODY MASS INDEX DOCD: CPT | Mod: CPTII,,, | Performed by: NURSE PRACTITIONER

## 2025-05-27 PROCEDURE — 3077F SYST BP >= 140 MM HG: CPT | Mod: CPTII,,, | Performed by: NURSE PRACTITIONER

## 2025-05-27 PROCEDURE — 86803 HEPATITIS C AB TEST: CPT | Mod: ,,, | Performed by: CLINICAL MEDICAL LABORATORY

## 2025-05-27 PROCEDURE — 84443 ASSAY THYROID STIM HORMONE: CPT | Mod: ,,, | Performed by: CLINICAL MEDICAL LABORATORY

## 2025-05-27 PROCEDURE — 82043 UR ALBUMIN QUANTITATIVE: CPT | Mod: ,,, | Performed by: CLINICAL MEDICAL LABORATORY

## 2025-05-27 PROCEDURE — 82570 ASSAY OF URINE CREATININE: CPT | Mod: ,,, | Performed by: CLINICAL MEDICAL LABORATORY

## 2025-05-27 PROCEDURE — 83036 HEMOGLOBIN GLYCOSYLATED A1C: CPT | Mod: ,,, | Performed by: CLINICAL MEDICAL LABORATORY

## 2025-05-27 PROCEDURE — 4010F ACE/ARB THERAPY RXD/TAKEN: CPT | Mod: CPTII,,, | Performed by: NURSE PRACTITIONER

## 2025-05-27 PROCEDURE — 3062F POS MACROALBUMINURIA REV: CPT | Mod: CPTII,,, | Performed by: NURSE PRACTITIONER

## 2025-05-27 PROCEDURE — 3046F HEMOGLOBIN A1C LEVEL >9.0%: CPT | Mod: CPTII,,, | Performed by: NURSE PRACTITIONER

## 2025-05-27 PROCEDURE — 80061 LIPID PANEL: CPT | Mod: ,,, | Performed by: CLINICAL MEDICAL LABORATORY

## 2025-05-27 RX ORDER — FEXOFENADINE HCL 180 MG/1
180 TABLET ORAL DAILY
Qty: 90 TABLET | Refills: 3 | Status: SHIPPED | OUTPATIENT
Start: 2025-05-27

## 2025-05-29 ENCOUNTER — HOSPITAL ENCOUNTER (OUTPATIENT)
Facility: HOSPITAL | Age: 60
Discharge: HOME | End: 2025-05-29
Attending: HOSPITALIST | Admitting: HOSPITALIST
Payer: MEDICARE

## 2025-05-29 VITALS
HEART RATE: 73 BPM | TEMPERATURE: 98 F | BODY MASS INDEX: 31.66 KG/M2 | RESPIRATION RATE: 16 BRPM | OXYGEN SATURATION: 96 % | DIASTOLIC BLOOD PRESSURE: 70 MMHG | SYSTOLIC BLOOD PRESSURE: 147 MMHG | WEIGHT: 197 LBS | HEIGHT: 66 IN

## 2025-05-29 DIAGNOSIS — Z01.810 PRE-OPERATIVE CARDIOVASCULAR EXAMINATION: ICD-10-CM

## 2025-05-29 DIAGNOSIS — I73.9 CLAUDICATION: ICD-10-CM

## 2025-05-29 LAB — GLUCOSE SERPL-MCNC: 365 MG/DL (ref 70–105)

## 2025-05-29 PROCEDURE — 93005 ELECTROCARDIOGRAM TRACING: CPT

## 2025-05-29 PROCEDURE — 82962 GLUCOSE BLOOD TEST: CPT

## 2025-05-29 PROCEDURE — 93010 ELECTROCARDIOGRAM REPORT: CPT | Mod: ,,, | Performed by: HOSPITALIST

## 2025-05-29 PROCEDURE — 99499 UNLISTED E&M SERVICE: CPT | Mod: ,,, | Performed by: HOSPITALIST

## 2025-05-29 RX ORDER — SODIUM CHLORIDE 0.9 % (FLUSH) 0.9 %
10 SYRINGE (ML) INJECTION
Status: DISCONTINUED | OUTPATIENT
Start: 2025-05-29 | End: 2025-05-29 | Stop reason: HOSPADM

## 2025-05-29 RX ORDER — SODIUM CHLORIDE 9 MG/ML
INJECTION, SOLUTION INTRAVENOUS ONCE
Status: DISCONTINUED | OUTPATIENT
Start: 2025-05-29 | End: 2025-05-29 | Stop reason: HOSPADM

## 2025-05-30 ENCOUNTER — RESULTS FOLLOW-UP (OUTPATIENT)
Dept: FAMILY MEDICINE | Facility: CLINIC | Age: 60
End: 2025-05-30

## 2025-05-30 RX ORDER — ROSUVASTATIN CALCIUM 40 MG/1
40 TABLET, COATED ORAL NIGHTLY
Qty: 90 TABLET | Refills: 3 | Status: SHIPPED | OUTPATIENT
Start: 2025-05-30

## 2025-06-02 DIAGNOSIS — N64.9 DISORDER OF BREAST, UNSPECIFIED: ICD-10-CM

## 2025-06-02 DIAGNOSIS — N63.0 MASS OF BREAST, UNSPECIFIED LATERALITY: Primary | ICD-10-CM

## 2025-06-02 LAB
OHS QRS DURATION: 84 MS
OHS QTC CALCULATION: 445 MS

## 2025-06-10 ENCOUNTER — OFFICE VISIT (OUTPATIENT)
Dept: CARDIOLOGY | Facility: CLINIC | Age: 60
End: 2025-06-10
Payer: MEDICARE

## 2025-06-10 VITALS
OXYGEN SATURATION: 99 % | DIASTOLIC BLOOD PRESSURE: 46 MMHG | BODY MASS INDEX: 34.17 KG/M2 | HEART RATE: 65 BPM | SYSTOLIC BLOOD PRESSURE: 109 MMHG | WEIGHT: 212.63 LBS | HEIGHT: 66 IN

## 2025-06-10 DIAGNOSIS — I25.10 CORONARY ARTERY DISEASE, UNSPECIFIED VESSEL OR LESION TYPE, UNSPECIFIED WHETHER ANGINA PRESENT, UNSPECIFIED WHETHER NATIVE OR TRANSPLANTED HEART: ICD-10-CM

## 2025-06-10 DIAGNOSIS — I73.9 PERIPHERAL ARTERIAL DISEASE WITH HISTORY OF REVASCULARIZATION: Primary | ICD-10-CM

## 2025-06-10 DIAGNOSIS — I50.22 HEART FAILURE WITH MID-RANGE EJECTION FRACTION (HFMEF): ICD-10-CM

## 2025-06-10 DIAGNOSIS — Z98.890 PERIPHERAL ARTERIAL DISEASE WITH HISTORY OF REVASCULARIZATION: Primary | ICD-10-CM

## 2025-06-10 DIAGNOSIS — R26.89 DECREASED FUNCTIONAL MOBILITY: ICD-10-CM

## 2025-06-10 DIAGNOSIS — I73.9 CLAUDICATION: Primary | ICD-10-CM

## 2025-06-10 PROCEDURE — 99215 OFFICE O/P EST HI 40 MIN: CPT | Mod: S$PBB,,, | Performed by: HOSPITALIST

## 2025-06-10 PROCEDURE — 3066F NEPHROPATHY DOC TX: CPT | Mod: CPTII,,, | Performed by: HOSPITALIST

## 2025-06-10 PROCEDURE — 3078F DIAST BP <80 MM HG: CPT | Mod: CPTII,,, | Performed by: HOSPITALIST

## 2025-06-10 PROCEDURE — 3008F BODY MASS INDEX DOCD: CPT | Mod: CPTII,,, | Performed by: HOSPITALIST

## 2025-06-10 PROCEDURE — 99999 PR PBB SHADOW E&M-EST. PATIENT-LVL V: CPT | Mod: PBBFAC,,, | Performed by: HOSPITALIST

## 2025-06-10 PROCEDURE — 3062F POS MACROALBUMINURIA REV: CPT | Mod: CPTII,,, | Performed by: HOSPITALIST

## 2025-06-10 PROCEDURE — 3074F SYST BP LT 130 MM HG: CPT | Mod: CPTII,,, | Performed by: HOSPITALIST

## 2025-06-10 PROCEDURE — 4010F ACE/ARB THERAPY RXD/TAKEN: CPT | Mod: CPTII,,, | Performed by: HOSPITALIST

## 2025-06-10 PROCEDURE — 3046F HEMOGLOBIN A1C LEVEL >9.0%: CPT | Mod: CPTII,,, | Performed by: HOSPITALIST

## 2025-06-10 PROCEDURE — 99215 OFFICE O/P EST HI 40 MIN: CPT | Mod: PBBFAC | Performed by: HOSPITALIST

## 2025-06-10 RX ORDER — SODIUM CHLORIDE 0.9 % (FLUSH) 0.9 %
2 SYRINGE (ML) INJECTION
OUTPATIENT
Start: 2025-06-18

## 2025-06-10 NOTE — H&P (VIEW-ONLY)
"CARDIOVASCULAR CONSULTATION        REASON FOR CONSULT:   Kirk Villar is a 59 y.o. female who presents for   Chief Complaint   Patient presents with    Shortness of Breath     With exertion     Edema     Bilateral lower ext.           HISTORY OF PRESENT ILLNESS, REVIEW OF SYSTEMS:   59 y.o. female who  has a past medical history of ACC/AHA stage C congestive heart failure due to ischemic cardiomyopathy, ACE inhibitor intolerance, Alcohol dependence with uncomplicated withdrawal, Broken shoulder, CAD (coronary artery disease), Chronic deep vein thrombosis (DVT) of proximal vein of lower extremity, unspecified laterality, CKD stage 3b, GFR 30-44 ml/min, Diabetes mellitus, type 2, Episode of recurrent major depressive disorder, unspecified depression episode severity, Factor 5 Leiden mutation, heterozygous, and Familial hyperlipidemia.    Today we discussed the patient's cardiac symptoms at length.     History of Present Illness    CHIEF COMPLAINT:  Patient presents today for follow up of severe claudication symptoms.    PERIPHERAL VASCULAR DISEASE:  She reports bilateral leg pain with walking, LLE more severely affected than right. Pain is exacerbated by decreased mobility. She has significant difficulty ambulating, requiring 4-5 stops to rest when walking across the street due to severe lower extremity pain. She also experiences dyspnea with exertion, which she notes is a chronic symptom without recent changes.    CARDIAC HISTORY:  She has a history of MI following stent placement due to not being prescribed Clopidogrel and ASA post-procedure. She underwent cardiac catheterization via radial approach in [place], [place] 2 years ago.    ANXIETY:  She has a history of anxiety related to medical procedures, which she describes as "severe" and attributes partially to past experiences with poor communication from healthcare providers. She tends to overthink in medical situations, which exacerbates her symptoms. " She is not currently taking any anti-anxiety medication but expresses interest in medication management before upcoming medical procedures.      ROS:  General: -fever, -chills, -fatigue, -weight gain, -weight loss  Eyes: -vision changes, -redness, -discharge  ENT: -ear pain, -nasal congestion, -sore throat  Cardiovascular: -chest pain, -palpitations, -lower extremity edema  Respiratory: -cough, -shortness of breath, +exertional dyspnea  Gastrointestinal: -abdominal pain, -nausea, -vomiting, -diarrhea, -constipation, -blood in stool  Genitourinary: -dysuria, -hematuria, -frequency  Musculoskeletal: -joint pain, -muscle pain, +lower extremity pain with movement, +limb pain  Skin: -rash, -lesion  Neurological: -headache, -dizziness, -numbness, -tingling  Psychiatric: +anxiety, -depression, -sleep difficulty   Cardiology focused 12-point ROS otherwise unremarkable except for as mentioned above in HPI/transcript and below in assessment/plan.   Pertinent Positives and Negatives documented herein.         PAST MEDICAL HISTORY:     Past Medical History:   Diagnosis Date    ACC/AHA stage C congestive heart failure due to ischemic cardiomyopathy 03/12/2024    ACE inhibitor intolerance 03/12/2024    Alcohol dependence with uncomplicated withdrawal 07/09/2024    Broken shoulder 11/02/2024    right shoulder    CAD (coronary artery disease) 07/09/2024    Chronic deep vein thrombosis (DVT) of proximal vein of lower extremity, unspecified laterality 09/03/2024    CKD stage 3b, GFR 30-44 ml/min 07/11/2024    Diabetes mellitus, type 2     Episode of recurrent major depressive disorder, unspecified depression episode severity 09/03/2024    Factor 5 Leiden mutation, heterozygous     Familial hyperlipidemia 03/12/2024    Plan to (re) initiate crestor  Did not tolerate lipitor         PAST SURGICAL HISTORY:     Past Surgical History:   Procedure Laterality Date    LEFT HEART CATHETERIZATION Left 3/4/2025    Procedure: Left heart cath;   Surgeon: Ramo Diggs MD;  Location: [facility] CATH LAB;  Service: Cardiology;  Laterality: Left;    REVERSE TOTAL SHOULDER ARTHROPLASTY Right 11/26/2024    Procedure: ARTHROPLASTY, SHOULDER, TOTAL, REVERSE;  Surgeon: Miguel Meyer MD;  Location: [facility] ORTHO OR;  Service: Orthopedics;  Laterality: Right;       ALLERGIES AND MEDICATION:   Review of patient's allergies indicates:  No Known Allergies     Medication List            Accurate as of Lisa 10, 2025 11:59 PM. If you have any questions, ask your nurse or doctor.                CONTINUE taking these medications      albuterol 90 mcg/actuation inhaler  Commonly known as: PROVENTIL/VENTOLIN HFA  USE 2 INHALATIONS BY MOUTH EVERY 6 HOURS AS NEEDED FOR WHEEZING  FOR RESCUE     albuterol-ipratropium 2.5 mg-0.5 mg/3 mL nebulizer solution  Commonly known as: DUO-NEB  USE 1 VIAL VIA NEBULIZER EVERY 6 HOURS AS NEEDED FOR WHEEZING  (FOR RESCUE)     blood sugar diagnostic Strp  Commonly known as: ACCU-CHEK GUIDE TEST STRIPS  1 strip by Misc.(Non-Drug; Combo Route) route 3 (three) times daily.     citalopram 40 MG tablet  Commonly known as: CeleXA  Take 1 tablet (40 mg total) by mouth once daily.     clopidogreL 75 mg tablet  Commonly known as: PLAVIX  Take 1 tablet (75 mg total) by mouth once daily.     cyclobenzaprine 10 MG tablet  Commonly known as: FLEXERIL  Take 1 tablet (10 mg total) by mouth 3 (three) times daily as needed for Muscle spasms.     docusate sodium 100 MG capsule  Commonly known as: COLACE  Take 2 capsules (200 mg total) by mouth once daily.     ENTRESTO 49-51 mg per tablet  Generic drug: sacubitriL-valsartan  TAKE 1 TABLET BY MOUTH TWICE  DAILY     ezetimibe 10 mg tablet  Commonly known as: ZETIA  Take 1 tablet (10 mg total) by mouth once daily.     FARXIGA 5 mg Tab tablet  Generic drug: dapagliflozin propanediol  TAKE 1 TABLET BY MOUTH ONCE  DAILY     fexofenadine 180 MG tablet  Commonly known as: ALLEGRA  Take 1 tablet (180 mg total)  "by mouth once daily.     furosemide 40 MG tablet  Commonly known as: LASIX  Take 1 tablet (40 mg total) by mouth once daily.     gabapentin 800 MG tablet  Commonly known as: NEURONTIN  TAKE 1 TABLET BY MOUTH 4 TIMES  DAILY AS NEEDED FOR NEUROPATHY     metoprolol succinate 25 MG 24 hr tablet  Commonly known as: TOPROL-XL  Take 1 tablet (25 mg total) by mouth 2 (two) times daily.     rivaroxaban 20 mg Tab  Commonly known as: XARELTO  Take 1 tablet (20 mg total) by mouth daily with dinner or evening meal.     rosuvastatin 40 MG Tab  Commonly known as: CRESTOR  Take 1 tablet (40 mg total) by mouth every evening.     traZODone 100 MG tablet  Commonly known as: DESYREL  Take 1 tablet (100 mg total) by mouth every evening.              SOCIAL HISTORY:   Social History[1]    FAMILY HISTORY:     Family History   Problem Relation Name Age of Onset    Cancer Mother      Heart failure Father                    PHYSICAL EXAM:     Vitals:    06/10/25 1007   BP: (!) 109/46   Pulse: 65    Body mass index is 34.31 kg/m².  Weight: 96.4 kg (212 lb 9.6 oz)   Height: 5' 6" (167.6 cm)     Gen: NAD  HEENT: NC/AT, MMM  Chest: normal wob  CV: RRR, no m/g/r  Ext: mild/trace edema of BLEs; very weak distal pulses  Skin: no rash  Psych: AMAA  Neuro: [facility]NGI      DATA:     Laboratory:  CBC:  Recent Labs   Lab 11/27/24  0501 02/12/25  1600 05/27/25  1608   WBC 8.82 8.76 6.67   Hemoglobin 8.9 L 11.8 L 11.0 L   Hematocrit 29.7 L 40.2 34.1 L   Platelet Count 275 325 259       CHEMISTRIES:  Recent Labs   Lab 07/10/24  0359 07/11/24  0454 11/27/24  0501 02/12/25  1600 05/27/25  1608   Glucose 397 H   < > 324 H 293 H 155 H   Sodium 135 L   < > 140 137 139   Potassium 4.5   < > 4.3 4.8 4.4   BUN 28 H   < > 16 19 37 H   Creatinine 1.45 H   < > 1.27 H 1.35 H 1.36 H   Calcium 8.9   < > 8.4 9.6 9.8   Magnesium 2.0  --   --   --   --     < > = values in this interval not displayed.       CARDIAC BIOMARKERS:      No results found for: "BNP"    COAGS:  Recent " Labs   Lab 11/22/24  1031   INR 1.16       LIPIDS/LFTS:  Recent Labs   Lab 11/30/22  1400 07/08/24  2040 08/08/24  1130 11/27/24  0501 05/27/25  1608   Cholesterol 348 H  --  192  --  196   Triglycerides 449 H  --  201 H  --  306 H   HDL Cholesterol 45  --  61 H  --  57   LDL Calculated  --   --  91  --  78   Non-  --  131  --  139   AST 8 L   < > 19 12 34   ALT 20   < > 19 <7 16    < > = values in this interval not displayed.       Hemoglobin A1C   Date Value Ref Range Status   05/27/2025 9.2 (H) <=7.0 % Final     Comment:       Normal:               <5.7%  Pre-Diabetic:       5.7% to 6.4%  Diabetic:             >6.4%  Diabetic Goal:     <7%   11/27/2024 8.5 (H) <=7.0 % Final     Comment:       Normal:               <5.7%  Pre-Diabetic:       5.7% to 6.4%  Diabetic:             >6.4%  Diabetic Goal:     <7%   07/09/2024 10.0 (H) 4.5 - 6.6 % Final     Comment:       Normal:               <5.7%  Pre-Diabetic:       5.7% to 6.4%  Diabetic:             >6.4%  Diabetic Goal:     <7%       TSH  Recent Labs   Lab 11/30/22  1400 08/08/24  1130 05/27/25  1608   TSH 0.361 1.300 0.597       The 10-year ASCVD risk score (Laura DK, et al., 2019) is: 13.1%    Values used to calculate the score:      Age: 59 years      Sex: Female      Is Non- : No      Diabetic: Yes      Tobacco smoker: Yes      Systolic Blood Pressure: 109 mmHg      Is BP treated: Yes      HDL Cholesterol: 57 mg/dL      Total Cholesterol: 196 mg/dL     IMAGING  No orders to display       ASSESSMENT AND PLAN   Narrative:  Assessment & Plan    IMPRESSION:  - Right anterior tibial artery occluded.  - Several stenosis likely present on both sides.  - Will attempt to go up and over to LLE first, then potentially do antegrade stick on RLE if time permits.  - Limiting factors for procedure: sedation safety, contrast dye usage, and radiation exposure.  - Discussed limitations of performing interventions on both legs in a single  session.  - Explained role of medical device representatives in bringing necessary equipment for procedure.    PERIPHERAL ARTERY DISEASE:  - Advised continuing walking, even through pain, to improve claudication symptoms.  - Ordered peripheral artery intervention procedure, prioritizing LLE.    ANXIETY:  - Started anti-anxiety medication to be administered before the procedure.    FOLLOW-UP:  - Follow up for procedure on next available day, preferably as first case of day, with early arrival for pre-procedure anti-anxiety medication administration.         #Lung Ca Screening - system generated - need to discuss with patient prior to ordering - will discuss on day of procedure.     Problem Noted   Peripheral Arterial Disease With History of Revascularization 11/22/2024    Plan R CFA access for planned LLE PAD intervention. There is overtly a mid LSFA lesion and high suspicion for L JACOB and/or CFA lesion(s). Do not plan to treat infrapopliteal disease, but did request authorization should single vessel runoff etc be present on invasive angiography. There is suggestion of AT and PER disease bilaterally that appears long/diffuse. That said, plan to treat inflow disease first and foremost and only proceed w/ below knee intervention if limb threatening. See notes below:    Claudication per patient has not only persisted, but worsened to the point that she is having difficulty ambulating (overtly, as in, the pain in her thighs/calves is so severe she cannot walk a block without stopping due to pain. She states both legs 'hurt' but, prior to discussing CTA w/ runoff, she noted her LLE was definitely 'worse.' This does correlate w/ her CTA w/ runoff, which showed bilateral patent SFA stents (1 in each SFA). suggestion of L JACOB/EIA disease that is not plainly extra-luminal; axial thins of the mid L JACOB, L CFA, and LSFA distal to the stent (all) show 20-80mm of >50% reduction in cross sectional area; feel R CFA access, w/ plan  for up and over approach to L JACOB in absence of R JACOB/EIA disease (less likely given less plaque burden, but there is a R JACOB lesion that is difficult to quantify on CTA, but cannot rule out RLE inflow disease which we would fix on the 'way in' or 'way out' depending on severity and anatomy  (these findings are not all reported on CT as is difficult to define extrinsic vs intrinsic remodeling due to artifact from medial calcific disease and radiology, as is often stated when discussed, is not reading for procedural planning). These findings were subsequently corroborated on coronal, sagittal, MIP, 3D recons. All images, all series, all projections, all recons personally reviewed by myself both in office and with the patient.     Heart Failure With Mid-Range Ejection Fraction (Hfmef) 3/12/2024    Partially recovered EF; ischemic cardiomyopathy; 25%->45% following ACS and PCI in [facility]  Plan: initiate/titrate GDMT      Uncontrolled Type 2 Diabetes Mellitus With Hyperglycemia 3/12/2024   Acc/Aha Stage C Congestive Heart Failure Due to Ischemic Cardiomyopathy 3/12/2024   Familial Hyperlipidemia 3/12/2024    Plan to (re) initiate crestor  Did not tolerate lipitor     Angina, Class III (Resolved) 2/3/2025    Is now having daily severe HEAD that is new/worse since last visit - both wrt frequency and severity; patient is now having symptoms even w/ minimal exertion or at rest. Has 3 coronary stents from prior - 2 patent, 1 thought to be severely stenosed (90% per patient).   -discussed lhc and cor angio for further ischemic eval - patient amenable    Risks, benefits and alternatives of the catheterization procedure were discussed with the patient.The risks of coronary angiography include but are not limited to: bleeding, infection, death, heart attack, arrhythmia, kidney injury or failure, potential need for dialysis, allergic reactions, stroke, need for emergency surgery, hematoma, pseudoaneurysm etc.  Should stenting  be indicated, the patient has agreed to dual anti-platelet therapy for 1-consecutive year with a drug-eluting stent and a minimum of 1-month with the use of a bare metal stent. Additionally, pt is aware that non-compliance is likely to result in stent clotting with heart attack, heart failure, and/or death  The risks of moderate sedation include hypotension, respiratory depression, arrhythmias, bronchospasm, and death. Informed consent was obtained and the  patient is agreeable to proceed with the procedure. Consent was placed on the chart.       Encounter for Cardiac Risk Counseling (Resolved) 11/22/2024    RUE injury, planned TSR Tues. Per patient. No cp or anginal complaints w/ ADLs/iADLs; we discussed given her surgery date is next Tuesday, somewhat limited (in terms of sensitivity), though her CVRI suggests she is low or intermediate risk, depending on her pre-op Hgb level (pending). Either way, feel is reasonable to proceed from risk:benefit standpoint, as there is significant mortality/morbidity/QOL etc if not repaired-  patient understands; agrees she would like to proceed, feel is reasonable, ultimately defer to  choice - happy to discuss if concern    0  >75   1  Cv dz hx  0  angina w ADLs/iADLs  0  vasc surg   0  emergent surg  ?  Hgb <12       0-1 pt: low  2-3 pt: intermed  >3 pt: high     CVRI, JACC 2019, validated, powered for discriminatory fx (30d mortality); superior c-statistic to NSQIP, RCRI, and all risk models prior to 2019                 This note was dictated with the help of speech recognition software.  There might be un-intended errors and/or substitutions.      This note was generated with the assistance of ambient listening technology. Verbal consent was obtained by the patient and accompanying visitor(s) for the recording of patient appointment to facilitate this note. I attest to having reviewed and edited the generated note for accuracy, though some syntax or spelling  errors may persist. Please contact the author of this note for any clarification.                 [1]   Social History  Socioeconomic History    Marital status: Unknown   Tobacco Use    Smoking status: Every Day     Current packs/day: 0.50     Average packs/day: 0.5 packs/day for 50.5 years (25.0 ttl pk-yrs)     Types: Cigarettes     Start date: 1975     Last attempt to quit: 12/1/2024    Smokeless tobacco: Never    Tobacco comments:     Patient states 4 singles a day   Substance and Sexual Activity    Alcohol use: Not Currently     Comment: occasionally    Drug use: Never    Sexual activity: Not Currently     Social Drivers of Health     Financial Resource Strain: Low Risk  (2/13/2025)    Overall Financial Resource Strain (CARDIA)     Difficulty of Paying Living Expenses: Not very hard   Food Insecurity: Food Insecurity Present (2/13/2025)    Hunger Vital Sign     Worried About Running Out of Food in the Last Year: Sometimes true     Ran Out of Food in the Last Year: Sometimes true   Transportation Needs: No Transportation Needs (2/13/2025)    PRAPARE - Transportation     Lack of Transportation (Medical): No     Lack of Transportation (Non-Medical): No   Physical Activity: Inactive (2/13/2025)    Exercise Vital Sign     Days of Exercise per Week: 0 days     Minutes of Exercise per Session: 0 min   Stress: No Stress Concern Present (2/13/2025)    [place] [employer] - Occupational Stress Questionnaire     Feeling of Stress : Only a little   Housing Stability: High Risk (2/13/2025)    Housing Stability Vital Sign     Unable to Pay for Housing in the Last Year: Yes     Number of Times Moved in the Last Year: 0     Homeless in the Last Year: No

## 2025-06-10 NOTE — PROGRESS NOTES
"CARDIOVASCULAR CONSULTATION        REASON FOR CONSULT:   Kirk Villar is a 59 y.o. female who presents for   Chief Complaint   Patient presents with    Shortness of Breath     With exertion     Edema     Bilateral lower ext.           HISTORY OF PRESENT ILLNESS, REVIEW OF SYSTEMS:   59 y.o. female who  has a past medical history of ACC/AHA stage C congestive heart failure due to ischemic cardiomyopathy, ACE inhibitor intolerance, Alcohol dependence with uncomplicated withdrawal, Broken shoulder, CAD (coronary artery disease), Chronic deep vein thrombosis (DVT) of proximal vein of lower extremity, unspecified laterality, CKD stage 3b, GFR 30-44 ml/min, Diabetes mellitus, type 2, Episode of recurrent major depressive disorder, unspecified depression episode severity, Factor 5 Leiden mutation, heterozygous, and Familial hyperlipidemia.    Today we discussed the patient's cardiac symptoms at length.     History of Present Illness    CHIEF COMPLAINT:  Patient presents today for follow up of severe claudication symptoms.    PERIPHERAL VASCULAR DISEASE:  She reports bilateral leg pain with walking, LLE more severely affected than right. Pain is exacerbated by decreased mobility. She has significant difficulty ambulating, requiring 4-5 stops to rest when walking across the street due to severe lower extremity pain. She also experiences dyspnea with exertion, which she notes is a chronic symptom without recent changes.    CARDIAC HISTORY:  She has a history of MI following stent placement due to not being prescribed Clopidogrel and ASA post-procedure. She underwent cardiac catheterization via radial approach in [place], [place] 2 years ago.    ANXIETY:  She has a history of anxiety related to medical procedures, which she describes as "severe" and attributes partially to past experiences with poor communication from healthcare providers. She tends to overthink in medical situations, which exacerbates her symptoms. " She is not currently taking any anti-anxiety medication but expresses interest in medication management before upcoming medical procedures.      ROS:  General: -fever, -chills, -fatigue, -weight gain, -weight loss  Eyes: -vision changes, -redness, -discharge  ENT: -ear pain, -nasal congestion, -sore throat  Cardiovascular: -chest pain, -palpitations, -lower extremity edema  Respiratory: -cough, -shortness of breath, +exertional dyspnea  Gastrointestinal: -abdominal pain, -nausea, -vomiting, -diarrhea, -constipation, -blood in stool  Genitourinary: -dysuria, -hematuria, -frequency  Musculoskeletal: -joint pain, -muscle pain, +lower extremity pain with movement, +limb pain  Skin: -rash, -lesion  Neurological: -headache, -dizziness, -numbness, -tingling  Psychiatric: +anxiety, -depression, -sleep difficulty   Cardiology focused 12-point ROS otherwise unremarkable except for as mentioned above in HPI/transcript and below in assessment/plan.   Pertinent Positives and Negatives documented herein.         PAST MEDICAL HISTORY:     Past Medical History:   Diagnosis Date    ACC/AHA stage C congestive heart failure due to ischemic cardiomyopathy 03/12/2024    ACE inhibitor intolerance 03/12/2024    Alcohol dependence with uncomplicated withdrawal 07/09/2024    Broken shoulder 11/02/2024    right shoulder    CAD (coronary artery disease) 07/09/2024    Chronic deep vein thrombosis (DVT) of proximal vein of lower extremity, unspecified laterality 09/03/2024    CKD stage 3b, GFR 30-44 ml/min 07/11/2024    Diabetes mellitus, type 2     Episode of recurrent major depressive disorder, unspecified depression episode severity 09/03/2024    Factor 5 Leiden mutation, heterozygous     Familial hyperlipidemia 03/12/2024    Plan to (re) initiate crestor  Did not tolerate lipitor         PAST SURGICAL HISTORY:     Past Surgical History:   Procedure Laterality Date    LEFT HEART CATHETERIZATION Left 3/4/2025    Procedure: Left heart cath;   Surgeon: Ramo Diggs MD;  Location: [facility] CATH LAB;  Service: Cardiology;  Laterality: Left;    REVERSE TOTAL SHOULDER ARTHROPLASTY Right 11/26/2024    Procedure: ARTHROPLASTY, SHOULDER, TOTAL, REVERSE;  Surgeon: Miguel Meyer MD;  Location: [facility] ORTHO OR;  Service: Orthopedics;  Laterality: Right;       ALLERGIES AND MEDICATION:   Review of patient's allergies indicates:  No Known Allergies     Medication List            Accurate as of Lisa 10, 2025 11:59 PM. If you have any questions, ask your nurse or doctor.                CONTINUE taking these medications      albuterol 90 mcg/actuation inhaler  Commonly known as: PROVENTIL/VENTOLIN HFA  USE 2 INHALATIONS BY MOUTH EVERY 6 HOURS AS NEEDED FOR WHEEZING  FOR RESCUE     albuterol-ipratropium 2.5 mg-0.5 mg/3 mL nebulizer solution  Commonly known as: DUO-NEB  USE 1 VIAL VIA NEBULIZER EVERY 6 HOURS AS NEEDED FOR WHEEZING  (FOR RESCUE)     blood sugar diagnostic Strp  Commonly known as: ACCU-CHEK GUIDE TEST STRIPS  1 strip by Misc.(Non-Drug; Combo Route) route 3 (three) times daily.     citalopram 40 MG tablet  Commonly known as: CeleXA  Take 1 tablet (40 mg total) by mouth once daily.     clopidogreL 75 mg tablet  Commonly known as: PLAVIX  Take 1 tablet (75 mg total) by mouth once daily.     cyclobenzaprine 10 MG tablet  Commonly known as: FLEXERIL  Take 1 tablet (10 mg total) by mouth 3 (three) times daily as needed for Muscle spasms.     docusate sodium 100 MG capsule  Commonly known as: COLACE  Take 2 capsules (200 mg total) by mouth once daily.     ENTRESTO 49-51 mg per tablet  Generic drug: sacubitriL-valsartan  TAKE 1 TABLET BY MOUTH TWICE  DAILY     ezetimibe 10 mg tablet  Commonly known as: ZETIA  Take 1 tablet (10 mg total) by mouth once daily.     FARXIGA 5 mg Tab tablet  Generic drug: dapagliflozin propanediol  TAKE 1 TABLET BY MOUTH ONCE  DAILY     fexofenadine 180 MG tablet  Commonly known as: ALLEGRA  Take 1 tablet (180 mg total)  "by mouth once daily.     furosemide 40 MG tablet  Commonly known as: LASIX  Take 1 tablet (40 mg total) by mouth once daily.     gabapentin 800 MG tablet  Commonly known as: NEURONTIN  TAKE 1 TABLET BY MOUTH 4 TIMES  DAILY AS NEEDED FOR NEUROPATHY     metoprolol succinate 25 MG 24 hr tablet  Commonly known as: TOPROL-XL  Take 1 tablet (25 mg total) by mouth 2 (two) times daily.     rivaroxaban 20 mg Tab  Commonly known as: XARELTO  Take 1 tablet (20 mg total) by mouth daily with dinner or evening meal.     rosuvastatin 40 MG Tab  Commonly known as: CRESTOR  Take 1 tablet (40 mg total) by mouth every evening.     traZODone 100 MG tablet  Commonly known as: DESYREL  Take 1 tablet (100 mg total) by mouth every evening.              SOCIAL HISTORY:   Social History[1]    FAMILY HISTORY:     Family History   Problem Relation Name Age of Onset    Cancer Mother      Heart failure Father                    PHYSICAL EXAM:     Vitals:    06/10/25 1007   BP: (!) 109/46   Pulse: 65    Body mass index is 34.31 kg/m².  Weight: 96.4 kg (212 lb 9.6 oz)   Height: 5' 6" (167.6 cm)     Gen: NAD  HEENT: NC/AT, MMM  Chest: normal wob  CV: RRR, no m/g/r  Ext: mild/trace edema of BLEs; very weak distal pulses  Skin: no rash  Psych: AMAA  Neuro: CNGI      DATA:     Laboratory:  CBC:  Recent Labs   Lab 11/27/24  0501 02/12/25  1600 05/27/25  1608   WBC 8.82 8.76 6.67   Hemoglobin 8.9 L 11.8 L 11.0 L   Hematocrit 29.7 L 40.2 34.1 L   Platelet Count 275 325 259       CHEMISTRIES:  Recent Labs   Lab 07/10/24  0359 07/11/24  0454 11/27/24  0501 02/12/25  1600 05/27/25  1608   Glucose 397 H   < > 324 H 293 H 155 H   Sodium 135 L   < > 140 137 139   Potassium 4.5   < > 4.3 4.8 4.4   BUN 28 H   < > 16 19 37 H   Creatinine 1.45 H   < > 1.27 H 1.35 H 1.36 H   Calcium 8.9   < > 8.4 9.6 9.8   Magnesium 2.0  --   --   --   --     < > = values in this interval not displayed.       CARDIAC BIOMARKERS:      No results found for: "BNP"    COAGS:  Recent " Labs   Lab 11/22/24  1031   INR 1.16       LIPIDS/LFTS:  Recent Labs   Lab 11/30/22  1400 07/08/24  2040 08/08/24  1130 11/27/24  0501 05/27/25  1608   Cholesterol 348 H  --  192  --  196   Triglycerides 449 H  --  201 H  --  306 H   HDL Cholesterol 45  --  61 H  --  57   LDL Calculated  --   --  91  --  78   Non-  --  131  --  139   AST 8 L   < > 19 12 34   ALT 20   < > 19 <7 16    < > = values in this interval not displayed.       Hemoglobin A1C   Date Value Ref Range Status   05/27/2025 9.2 (H) <=7.0 % Final     Comment:       Normal:               <5.7%  Pre-Diabetic:       5.7% to 6.4%  Diabetic:             >6.4%  Diabetic Goal:     <7%   11/27/2024 8.5 (H) <=7.0 % Final     Comment:       Normal:               <5.7%  Pre-Diabetic:       5.7% to 6.4%  Diabetic:             >6.4%  Diabetic Goal:     <7%   07/09/2024 10.0 (H) 4.5 - 6.6 % Final     Comment:       Normal:               <5.7%  Pre-Diabetic:       5.7% to 6.4%  Diabetic:             >6.4%  Diabetic Goal:     <7%       TSH  Recent Labs   Lab 11/30/22  1400 08/08/24  1130 05/27/25  1608   TSH 0.361 1.300 0.597       The 10-year ASCVD risk score (Laura DK, et al., 2019) is: 13.1%    Values used to calculate the score:      Age: 59 years      Sex: Female      Is Non- : No      Diabetic: Yes      Tobacco smoker: Yes      Systolic Blood Pressure: 109 mmHg      Is BP treated: Yes      HDL Cholesterol: 57 mg/dL      Total Cholesterol: 196 mg/dL     IMAGING  No orders to display       ASSESSMENT AND PLAN   Narrative:  Assessment & Plan    IMPRESSION:  - Right anterior tibial artery occluded.  - Several stenosis likely present on both sides.  - Will attempt to go up and over to LLE first, then potentially do antegrade stick on RLE if time permits.  - Limiting factors for procedure: sedation safety, contrast dye usage, and radiation exposure.  - Discussed limitations of performing interventions on both legs in a single  session.  - Explained role of medical device representatives in bringing necessary equipment for procedure.    PERIPHERAL ARTERY DISEASE:  - Advised continuing walking, even through pain, to improve claudication symptoms.  - Ordered peripheral artery intervention procedure, prioritizing LLE.    ANXIETY:  - Started anti-anxiety medication to be administered before the procedure.    FOLLOW-UP:  - Follow up for procedure on next available day, preferably as first case of day, with early arrival for pre-procedure anti-anxiety medication administration.         #Lung Ca Screening - system generated - need to discuss with patient prior to ordering - will discuss on day of procedure.     Problem Noted   Peripheral Arterial Disease With History of Revascularization 11/22/2024    Plan R CFA access for planned LLE PAD intervention. There is overtly a mid LSFA lesion and high suspicion for L JACOB and/or CFA lesion(s). Do not plan to treat infrapopliteal disease, but did request authorization should single vessel runoff etc be present on invasive angiography. There is suggestion of AT and PER disease bilaterally that appears long/diffuse. That said, plan to treat inflow disease first and foremost and only proceed w/ below knee intervention if limb threatening. See notes below:    Claudication per patient has not only persisted, but worsened to the point that she is having difficulty ambulating (overtly, as in, the pain in her thighs/calves is so severe she cannot walk a block without stopping due to pain. She states both legs 'hurt' but, prior to discussing CTA w/ runoff, she noted her LLE was definitely 'worse.' This does correlate w/ her CTA w/ runoff, which showed bilateral patent SFA stents (1 in each SFA). suggestion of L JACOB/EIA disease that is not plainly extra-luminal; axial thins of the mid L JACOB, L CFA, and LSFA distal to the stent (all) show 20-80mm of >50% reduction in cross sectional area; feel R CFA access, w/ plan  for up and over approach to L JACOB in absence of R JACOB/EIA disease (less likely given less plaque burden, but there is a R JACOB lesion that is difficult to quantify on CTA, but cannot rule out RLE inflow disease which we would fix on the 'way in' or 'way out' depending on severity and anatomy  (these findings are not all reported on CT as is difficult to define extrinsic vs intrinsic remodeling due to artifact from medial calcific disease and radiology, as is often stated when discussed, is not reading for procedural planning). These findings were subsequently corroborated on coronal, sagittal, MIP, 3D recons. All images, all series, all projections, all recons personally reviewed by myself both in office and with the patient.     Heart Failure With Mid-Range Ejection Fraction (Hfmef) 3/12/2024    Partially recovered EF; ischemic cardiomyopathy; 25%->45% following ACS and PCI in [facility]  Plan: initiate/titrate GDMT      Uncontrolled Type 2 Diabetes Mellitus With Hyperglycemia 3/12/2024   Acc/Aha Stage C Congestive Heart Failure Due to Ischemic Cardiomyopathy 3/12/2024   Familial Hyperlipidemia 3/12/2024    Plan to (re) initiate crestor  Did not tolerate lipitor     Angina, Class III (Resolved) 2/3/2025    Is now having daily severe HEAD that is new/worse since last visit - both wrt frequency and severity; patient is now having symptoms even w/ minimal exertion or at rest. Has 3 coronary stents from prior - 2 patent, 1 thought to be severely stenosed (90% per patient).   -discussed lhc and cor angio for further ischemic eval - patient amenable    Risks, benefits and alternatives of the catheterization procedure were discussed with the patient.The risks of coronary angiography include but are not limited to: bleeding, infection, death, heart attack, arrhythmia, kidney injury or failure, potential need for dialysis, allergic reactions, stroke, need for emergency surgery, hematoma, pseudoaneurysm etc.  Should stenting  be indicated, the patient has agreed to dual anti-platelet therapy for 1-consecutive year with a drug-eluting stent and a minimum of 1-month with the use of a bare metal stent. Additionally, pt is aware that non-compliance is likely to result in stent clotting with heart attack, heart failure, and/or death  The risks of moderate sedation include hypotension, respiratory depression, arrhythmias, bronchospasm, and death. Informed consent was obtained and the  patient is agreeable to proceed with the procedure. Consent was placed on the chart.       Encounter for Cardiac Risk Counseling (Resolved) 11/22/2024    RUE injury, planned TSR Tues. Per patient. No cp or anginal complaints w/ ADLs/iADLs; we discussed given her surgery date is next Tuesday, somewhat limited (in terms of sensitivity), though her CVRI suggests she is low or intermediate risk, depending on her pre-op Hgb level (pending). Either way, feel is reasonable to proceed from risk:benefit standpoint, as there is significant mortality/morbidity/QOL etc if not repaired-  patient understands; agrees she would like to proceed, feel is reasonable, ultimately defer to  choice - happy to discuss if concern    0  >75   1  Cv dz hx  0  angina w ADLs/iADLs  0  vasc surg   0  emergent surg  ?  Hgb <12       0-1 pt: low  2-3 pt: intermed  >3 pt: high     CVRI, JACC 2019, validated, powered for discriminatory fx (30d mortality); superior c-statistic to NSQIP, RCRI, and all risk models prior to 2019                 This note was dictated with the help of speech recognition software.  There might be un-intended errors and/or substitutions.      This note was generated with the assistance of ambient listening technology. Verbal consent was obtained by the patient and accompanying visitor(s) for the recording of patient appointment to facilitate this note. I attest to having reviewed and edited the generated note for accuracy, though some syntax or spelling  errors may persist. Please contact the author of this note for any clarification.                 [1]   Social History  Socioeconomic History    Marital status: Unknown   Tobacco Use    Smoking status: Every Day     Current packs/day: 0.50     Average packs/day: 0.5 packs/day for 50.5 years (25.0 ttl pk-yrs)     Types: Cigarettes     Start date: 1975     Last attempt to quit: 12/1/2024    Smokeless tobacco: Never    Tobacco comments:     Patient states 4 singles a day   Substance and Sexual Activity    Alcohol use: Not Currently     Comment: occasionally    Drug use: Never    Sexual activity: Not Currently     Social Drivers of Health     Financial Resource Strain: Low Risk  (2/13/2025)    Overall Financial Resource Strain (CARDIA)     Difficulty of Paying Living Expenses: Not very hard   Food Insecurity: Food Insecurity Present (2/13/2025)    Hunger Vital Sign     Worried About Running Out of Food in the Last Year: Sometimes true     Ran Out of Food in the Last Year: Sometimes true   Transportation Needs: No Transportation Needs (2/13/2025)    PRAPARE - Transportation     Lack of Transportation (Medical): No     Lack of Transportation (Non-Medical): No   Physical Activity: Inactive (2/13/2025)    Exercise Vital Sign     Days of Exercise per Week: 0 days     Minutes of Exercise per Session: 0 min   Stress: No Stress Concern Present (2/13/2025)    [employer] - Occupational Stress Questionnaire     Feeling of Stress : Only a little   Housing Stability: High Risk (2/13/2025)    Housing Stability Vital Sign     Unable to Pay for Housing in the Last Year: Yes     Number of Times Moved in the Last Year: 0     Homeless in the Last Year: No

## 2025-06-16 PROBLEM — F10.230 ALCOHOL DEPENDENCE WITH UNCOMPLICATED WITHDRAWAL: Status: RESOLVED | Noted: 2024-07-09 | Resolved: 2025-06-16

## 2025-06-16 PROBLEM — Z98.890 PERIPHERAL ARTERIAL DISEASE WITH HISTORY OF REVASCULARIZATION: Status: ACTIVE | Noted: 2024-11-22

## 2025-06-17 ENCOUNTER — HOSPITAL ENCOUNTER (OUTPATIENT)
Dept: RADIOLOGY | Facility: HOSPITAL | Age: 60
Discharge: HOME | End: 2025-06-17
Attending: NURSE PRACTITIONER
Payer: MEDICARE

## 2025-06-17 VITALS — BODY MASS INDEX: 34.07 KG/M2 | WEIGHT: 212 LBS | HEIGHT: 66 IN

## 2025-06-17 DIAGNOSIS — N63.0 MASS OF BREAST, UNSPECIFIED LATERALITY: ICD-10-CM

## 2025-06-17 DIAGNOSIS — I73.9 CLAUDICATION: ICD-10-CM

## 2025-06-17 DIAGNOSIS — Z03.89 ENCOUNTER FOR OBSERVATION FOR OTHER SUSPECTED DISEASES AND CONDITIONS RULED OUT: ICD-10-CM

## 2025-06-17 DIAGNOSIS — I73.9 PAD (PERIPHERAL ARTERY DISEASE): ICD-10-CM

## 2025-06-17 PROCEDURE — 76642 ULTRASOUND BREAST LIMITED: CPT | Mod: TC,50

## 2025-06-17 PROCEDURE — 77062 BREAST TOMOSYNTHESIS BI: CPT | Mod: 26,,, | Performed by: RADIOLOGY

## 2025-06-17 PROCEDURE — 77062 BREAST TOMOSYNTHESIS BI: CPT | Mod: TC

## 2025-06-17 PROCEDURE — 77066 DX MAMMO INCL CAD BI: CPT | Mod: 26,,, | Performed by: RADIOLOGY

## 2025-06-17 RX ORDER — SODIUM CHLORIDE 0.9 % (FLUSH) 0.9 %
2 SYRINGE (ML) INJECTION
Status: CANCELLED | OUTPATIENT
Start: 2025-06-18

## 2025-06-18 ENCOUNTER — HOSPITAL ENCOUNTER (OUTPATIENT)
Facility: HOSPITAL | Age: 60
Discharge: HOME | End: 2025-06-18
Attending: HOSPITALIST | Admitting: HOSPITALIST
Payer: MEDICARE

## 2025-06-18 VITALS
HEART RATE: 85 BPM | OXYGEN SATURATION: 88 % | BODY MASS INDEX: 34.07 KG/M2 | TEMPERATURE: 98 F | RESPIRATION RATE: 18 BRPM | SYSTOLIC BLOOD PRESSURE: 142 MMHG | DIASTOLIC BLOOD PRESSURE: 72 MMHG | WEIGHT: 212 LBS | HEIGHT: 66 IN

## 2025-06-18 DIAGNOSIS — I73.9 CLAUDICATION: ICD-10-CM

## 2025-06-18 DIAGNOSIS — Z01.818 PREOP EXAMINATION: ICD-10-CM

## 2025-06-18 DIAGNOSIS — I73.9 PAD (PERIPHERAL ARTERY DISEASE): ICD-10-CM

## 2025-06-18 DIAGNOSIS — R26.89 DECREASED FUNCTIONAL MOBILITY: Primary | ICD-10-CM

## 2025-06-18 LAB — GLUCOSE SERPL-MCNC: 333 MG/DL (ref 70–105)

## 2025-06-18 PROCEDURE — 37224 HC FEM/POPL REVAS W/TLA: CPT | Mod: LT | Performed by: HOSPITALIST

## 2025-06-18 PROCEDURE — 75710 ARTERY X-RAYS ARM/LEG: CPT | Mod: XU,LT | Performed by: HOSPITALIST

## 2025-06-18 PROCEDURE — C1760 CLOSURE DEV, VASC: HCPCS | Performed by: HOSPITALIST

## 2025-06-18 PROCEDURE — C1769 GUIDE WIRE: HCPCS | Performed by: HOSPITALIST

## 2025-06-18 PROCEDURE — 37224 PR FEM/POPL REVAS W/TLA: CPT | Mod: LT,,, | Performed by: HOSPITALIST

## 2025-06-18 PROCEDURE — 27201423 OPTIME MED/SURG SUP & DEVICES STERILE SUPPLY: Performed by: HOSPITALIST

## 2025-06-18 PROCEDURE — 63600175 PHARM REV CODE 636 W HCPCS: Performed by: HOSPITALIST

## 2025-06-18 PROCEDURE — 99152 MOD SED SAME PHYS/QHP 5/>YRS: CPT | Performed by: HOSPITALIST

## 2025-06-18 PROCEDURE — 37252 INTRVASC US NONCORONARY 1ST: CPT | Performed by: HOSPITALIST

## 2025-06-18 PROCEDURE — 75710 ARTERY X-RAYS ARM/LEG: CPT | Mod: 26,XU,LT, | Performed by: HOSPITALIST

## 2025-06-18 PROCEDURE — 37252 INTRVASC US NONCORONARY 1ST: CPT | Mod: ,,, | Performed by: HOSPITALIST

## 2025-06-18 PROCEDURE — 25500020 PHARM REV CODE 255: Performed by: HOSPITALIST

## 2025-06-18 PROCEDURE — 25000003 PHARM REV CODE 250: Performed by: HOSPITALIST

## 2025-06-18 PROCEDURE — 99152 MOD SED SAME PHYS/QHP 5/>YRS: CPT | Mod: ,,, | Performed by: HOSPITALIST

## 2025-06-18 PROCEDURE — C1887 CATHETER, GUIDING: HCPCS | Performed by: HOSPITALIST

## 2025-06-18 PROCEDURE — 99153 MOD SED SAME PHYS/QHP EA: CPT | Performed by: HOSPITALIST

## 2025-06-18 PROCEDURE — C1753 CATH, INTRAVAS ULTRASOUND: HCPCS | Performed by: HOSPITALIST

## 2025-06-18 PROCEDURE — C1894 INTRO/SHEATH, NON-LASER: HCPCS | Performed by: HOSPITALIST

## 2025-06-18 PROCEDURE — C2623 CATH, TRANSLUMIN, DRUG-COAT: HCPCS | Performed by: HOSPITALIST

## 2025-06-18 DEVICE — ANGIO-SEAL VIP VASCULAR CLOSURE DEVICE
Type: IMPLANTABLE DEVICE | Site: FEMORAL | Status: FUNCTIONAL
Brand: ANGIO-SEAL

## 2025-06-18 RX ORDER — DIPHENHYDRAMINE HCL 25 MG
50 CAPSULE ORAL ONCE
Status: COMPLETED | OUTPATIENT
Start: 2025-06-18 | End: 2025-06-18

## 2025-06-18 RX ORDER — LABETALOL HYDROCHLORIDE 5 MG/ML
10 INJECTION, SOLUTION INTRAVENOUS EVERY 4 HOURS PRN
Status: DISCONTINUED | OUTPATIENT
Start: 2025-06-18 | End: 2025-06-18 | Stop reason: HOSPADM

## 2025-06-18 RX ORDER — LIDOCAINE HYDROCHLORIDE 10 MG/ML
INJECTION, SOLUTION INFILTRATION; PERINEURAL
Status: DISCONTINUED | OUTPATIENT
Start: 2025-06-18 | End: 2025-06-18 | Stop reason: HOSPADM

## 2025-06-18 RX ORDER — HYDROMORPHONE HYDROCHLORIDE 2 MG/ML
0.5 INJECTION, SOLUTION INTRAMUSCULAR; INTRAVENOUS; SUBCUTANEOUS
Refills: 0 | Status: DISCONTINUED | OUTPATIENT
Start: 2025-06-18 | End: 2025-06-18 | Stop reason: HOSPADM

## 2025-06-18 RX ORDER — FENTANYL CITRATE 50 UG/ML
INJECTION, SOLUTION INTRAMUSCULAR; INTRAVENOUS
Status: DISCONTINUED | OUTPATIENT
Start: 2025-06-18 | End: 2025-06-18 | Stop reason: HOSPADM

## 2025-06-18 RX ORDER — CLOPIDOGREL BISULFATE 300 MG/1
TABLET, FILM COATED ORAL
Status: DISCONTINUED | OUTPATIENT
Start: 2025-06-18 | End: 2025-06-18 | Stop reason: HOSPADM

## 2025-06-18 RX ORDER — HEPARIN SODIUM 1000 [USP'U]/ML
INJECTION, SOLUTION INTRAVENOUS; SUBCUTANEOUS
Status: DISCONTINUED | OUTPATIENT
Start: 2025-06-18 | End: 2025-06-18 | Stop reason: HOSPADM

## 2025-06-18 RX ORDER — DIAZEPAM 5 MG/1
5 TABLET ORAL
Status: COMPLETED | OUTPATIENT
Start: 2025-06-18 | End: 2025-06-18

## 2025-06-18 RX ORDER — ONDANSETRON HYDROCHLORIDE 2 MG/ML
4 INJECTION, SOLUTION INTRAVENOUS EVERY 12 HOURS PRN
Status: DISCONTINUED | OUTPATIENT
Start: 2025-06-18 | End: 2025-06-18 | Stop reason: HOSPADM

## 2025-06-18 RX ORDER — SODIUM CHLORIDE 9 MG/ML
INJECTION, SOLUTION INTRAVENOUS
Status: DISCONTINUED | OUTPATIENT
Start: 2025-06-18 | End: 2025-06-18 | Stop reason: HOSPADM

## 2025-06-18 RX ORDER — MIDAZOLAM HYDROCHLORIDE 1 MG/ML
INJECTION INTRAMUSCULAR; INTRAVENOUS
Status: DISCONTINUED | OUTPATIENT
Start: 2025-06-18 | End: 2025-06-18 | Stop reason: HOSPADM

## 2025-06-18 RX ORDER — SODIUM CHLORIDE 0.9 % (FLUSH) 0.9 %
2 SYRINGE (ML) INJECTION
Status: DISCONTINUED | OUTPATIENT
Start: 2025-06-18 | End: 2025-06-18 | Stop reason: HOSPADM

## 2025-06-18 RX ORDER — OXYCODONE HYDROCHLORIDE 5 MG/1
5 TABLET ORAL EVERY 4 HOURS PRN
Refills: 0 | Status: DISCONTINUED | OUTPATIENT
Start: 2025-06-18 | End: 2025-06-18 | Stop reason: HOSPADM

## 2025-06-18 RX ORDER — ACETAMINOPHEN 325 MG/1
650 TABLET ORAL EVERY 6 HOURS PRN
Status: DISCONTINUED | OUTPATIENT
Start: 2025-06-18 | End: 2025-06-18 | Stop reason: HOSPADM

## 2025-06-18 RX ORDER — IOPAMIDOL 755 MG/ML
INJECTION, SOLUTION INTRAVASCULAR
Status: DISCONTINUED | OUTPATIENT
Start: 2025-06-18 | End: 2025-06-18 | Stop reason: HOSPADM

## 2025-06-18 RX ORDER — SODIUM CHLORIDE 9 MG/ML
INJECTION, SOLUTION INTRAVENOUS CONTINUOUS
Status: DISCONTINUED | OUTPATIENT
Start: 2025-06-18 | End: 2025-06-18 | Stop reason: HOSPADM

## 2025-06-18 RX ADMIN — HYDROMORPHONE HYDROCHLORIDE 0.5 MG: 2 INJECTION INTRAMUSCULAR; INTRAVENOUS; SUBCUTANEOUS at 11:06

## 2025-06-18 RX ADMIN — DIPHENHYDRAMINE HYDROCHLORIDE 50 MG: 25 CAPSULE ORAL at 07:06

## 2025-06-18 RX ADMIN — DIAZEPAM 5 MG: 5 TABLET ORAL at 07:06

## 2025-06-18 NOTE — Clinical Note
An angiography was performed of the mid left popliteal artery and anterior tibial artery via hand injection with 3 mL of contrast

## 2025-06-18 NOTE — Clinical Note
An angiography was performed of the proximal, mid and distal left superficial femoral arterypost intervention  via hand injection with 3 mL of contrast

## 2025-06-18 NOTE — Clinical Note
An angiography was performed of the proximal, mid and distal left popliteal artery via hand injection with 3 mL of contrast

## 2025-06-18 NOTE — Clinical Note
An angiography was performed of the proximal, mid and distal left superficial femoral artery via hand injection with 3 mL of contrast

## 2025-06-18 NOTE — DISCHARGE INSTRUCTIONS
Keep dressing to groin in place for 24 hours. After 24 hours remove and gently clean site with mild soap and water then pat dry. May cover with band-aid. If bleeding from site apply pressure for 10-15 minutes. If bleeding continues or if there is excessive bruising or swelling to site go to closest ER for evaluation.   Do not submerge site in water for 72 hours.   No heavy lifting or other strenuous activities for 72 hours.   No driving for 24 hours.   Drink plenty of water over next 48 hours to help flush kidneys.   No smoking!   Keep all follow up appointments.   Take all medications as ordered. Notify your doctor immediately if you cannot afford your medications!     Thank you for allowing us at [facility] to care for you today!

## 2025-06-18 NOTE — Clinical Note
An angiography was performed of the proximal, mid and distal left popliteal artery, infrapopliteal artery, anterior tibial artery, tibio-peroneal trunk, peroneal artery and posterior tibial artery via hand injection with 3 mL of contrast

## 2025-06-18 NOTE — Clinical Note
An angiography was performed of the distal left common femoral arterypost intervention  via hand injection with 3 mL of contrast

## 2025-06-18 NOTE — Clinical Note
24 ml of contrast were injected throughout the case. 176 mL of contrast was the total wasted during the case. 200 mL was the total amount used during the case.

## 2025-06-18 NOTE — DISCHARGE SUMMARY
[facility]  Discharge Note  Short Stay    Procedure(s) (LRB):  Atherectomy, Lower Arterial (Bilateral)  Intravascular Ultrasound, Non-Coronary (Bilateral)  INSERTION, STENT, VESSEL, PERIPHERAL (N/A)  Atherectomy (Bilateral)  ATHERECTOMY, PERIPHERAL BLOOD VESSEL (Bilateral)  ULTRASOUND, INTRAVASCULAR (N/A)      OUTCOME: Condition has improved and patient is now ready for discharge.    DISPOSITION: Home or Self Care    FINAL DIAGNOSIS:  -s/p successful drug-coated balloon angioplasty of L EIA/CFA lesion, L SFA lesion(s) w/ [id number] and [id number] balloons respectively for angiographically significant (80%) L EIA/CFA lesion, L SFA (70-90%) in-stent sequential lesions), 0% residual, excellent angiographic appearance, w/ significant improvement in distal 3-vessel runoff and no complications    FOLLOWUP: In clinic with Dr. Diggs in 1-2 weeks    DISCHARGE INSTRUCTIONS:  please continue to take your xarelto, plavix, crestor as well as your anti-hypertension medications, heart failure medications; please walk daily, and please do not lift more than 10 lbs for next 1-2 weeks     Clinical Reference Documents Added to Patient Instructions         Document    HEART HEALTHY DIET (ENGLISH)    PERIPHERAL VASCULAR STENTING DISCHARGE INSTRUCTIONS (ENGLISH)    QUITTING SMOKING FOR ADULTS (ENGLISH)            TIME SPENT ON DISCHARGE: 35 minutes

## 2025-06-18 NOTE — Clinical Note
An angiography was performed of the proximal, mid and distal left anterior tibial artery via hand injection with 3 mL of contrast

## 2025-06-24 RX ORDER — METHYLPREDNISOLONE 4 MG/1
TABLET ORAL
Qty: 21 TABLET | Refills: 0 | Status: SHIPPED | OUTPATIENT
Start: 2025-06-24

## 2025-07-15 ENCOUNTER — OFFICE VISIT (OUTPATIENT)
Dept: FAMILY MEDICINE | Facility: CLINIC | Age: 60
End: 2025-07-15
Payer: MEDICARE

## 2025-07-15 VITALS
SYSTOLIC BLOOD PRESSURE: 113 MMHG | TEMPERATURE: 98 F | HEART RATE: 85 BPM | RESPIRATION RATE: 20 BRPM | OXYGEN SATURATION: 95 % | DIASTOLIC BLOOD PRESSURE: 71 MMHG | HEIGHT: 66 IN | BODY MASS INDEX: 33.75 KG/M2 | WEIGHT: 210 LBS

## 2025-07-15 DIAGNOSIS — Z79.4 TYPE 2 DIABETES MELLITUS WITH HYPERGLYCEMIA, WITH LONG-TERM CURRENT USE OF INSULIN: Primary | ICD-10-CM

## 2025-07-15 DIAGNOSIS — R11.0 NAUSEA: ICD-10-CM

## 2025-07-15 DIAGNOSIS — E11.65 TYPE 2 DIABETES MELLITUS WITH HYPERGLYCEMIA, WITH LONG-TERM CURRENT USE OF INSULIN: Primary | ICD-10-CM

## 2025-07-15 PROCEDURE — 1160F RVW MEDS BY RX/DR IN RCRD: CPT | Mod: CPTII,,, | Performed by: NURSE PRACTITIONER

## 2025-07-15 PROCEDURE — 1159F MED LIST DOCD IN RCRD: CPT | Mod: CPTII,,, | Performed by: NURSE PRACTITIONER

## 2025-07-15 PROCEDURE — 3066F NEPHROPATHY DOC TX: CPT | Mod: CPTII,,, | Performed by: NURSE PRACTITIONER

## 2025-07-15 PROCEDURE — 3078F DIAST BP <80 MM HG: CPT | Mod: CPTII,,, | Performed by: NURSE PRACTITIONER

## 2025-07-15 PROCEDURE — 3008F BODY MASS INDEX DOCD: CPT | Mod: CPTII,,, | Performed by: NURSE PRACTITIONER

## 2025-07-15 PROCEDURE — 99214 OFFICE O/P EST MOD 30 MIN: CPT | Mod: ,,, | Performed by: NURSE PRACTITIONER

## 2025-07-15 PROCEDURE — 3046F HEMOGLOBIN A1C LEVEL >9.0%: CPT | Mod: CPTII,,, | Performed by: NURSE PRACTITIONER

## 2025-07-15 PROCEDURE — 3074F SYST BP LT 130 MM HG: CPT | Mod: CPTII,,, | Performed by: NURSE PRACTITIONER

## 2025-07-15 PROCEDURE — 3062F POS MACROALBUMINURIA REV: CPT | Mod: CPTII,,, | Performed by: NURSE PRACTITIONER

## 2025-07-15 PROCEDURE — 4010F ACE/ARB THERAPY RXD/TAKEN: CPT | Mod: CPTII,,, | Performed by: NURSE PRACTITIONER

## 2025-07-15 RX ORDER — TIRZEPATIDE 2.5 MG/.5ML
2.5 INJECTION, SOLUTION SUBCUTANEOUS
Qty: 2 ML | Refills: 0 | Status: SHIPPED | OUTPATIENT
Start: 2025-07-15 | End: 2025-08-12

## 2025-07-15 RX ORDER — ONDANSETRON 4 MG/1
4 TABLET, ORALLY DISINTEGRATING ORAL EVERY 6 HOURS PRN
Qty: 24 TABLET | Refills: 2 | Status: SHIPPED | OUTPATIENT
Start: 2025-07-15

## 2025-07-15 RX ORDER — TIRZEPATIDE 5 MG/.5ML
5 INJECTION, SOLUTION SUBCUTANEOUS
Qty: 2 ML | Refills: 0 | Status: SHIPPED | OUTPATIENT
Start: 2025-08-13 | End: 2025-09-10

## 2025-07-15 NOTE — PROGRESS NOTES
Anabell Mendez NP   [address] [place] [address]     PATIENT NAME: Kirk Villar  : 1965  DATE: 7/15/25  MRN: [id number]      Billing Provider: Anabell Mendez NP  Level of Service: [place] OFFICE/OUTPT VISIT, EST, LEVL III, 20-29 MIN  Patient PCP Information       Provider PCP Type    Anabell Mendez NP General            Reason for Visit / Chief Complaint: Follow-up       Update PCP  Update Chief Complaint         History of Present Illness / Problem Focused Workflow     Kirk Villar presents to the clinic with Follow-up     History of Present Illness    HPI:  Patient reports significant fluid retention, currently taking 6 40 mg Lasix tablets daily without adequate relief. Her whole body is swollen, including her face, with particular swelling in the left leg. She has been drinking 6-7 bottles of water daily. She reports difficulty walking due to muscle weakness and pain, and has purchased a pedal exerciser to build up muscle strength and stamina.    She expresses interest in trying Mounjaro for weight management. She previously attempted another weight loss medication but discontinued due to esophageal irritation, causing vomiting even with anti-nausea medication. Her current eating habits are driven by boredom, frequently visiting the refrigerator.    She mentions an unhealed wound on her leg, which she believes is slow to heal due to the swelling. She describes a previous episode of a severe wound with drainage.    Regarding recent medical care, she avoided hospitalization due to a steroid pack prescribed by the doctor. She has an upcoming follow-up visit with cardiology on Thursday. She has stopped alcohol completely.    She denies any current signs of infection in the leg wound, such as redness around the wound or increased warmth.    MEDICATIONS:  Patient is on Lasix 40 mg, taking 6 tablets daily for fluid retention. She is also on potassium supplements and stool softeners. A medication  was discontinued due to esophageal irritation and causing vomiting, even with anti-nausea medication.    MEDICAL HISTORY:  Patient has a history of diabetes, vascular issues, and fluid retention.    SURGICAL HISTORY:  She has undergone shoulder surgery and leg surgery, with the possibility of multiple leg surgeries.    SOCIAL HISTORY:  Alcohol: Stopped drinking      ROS:  General: -fever, -chills, -fatigue, -weight gain, -weight loss, +facial swelling, +exercise intolerance  Eyes: -vision changes, -redness, -discharge  ENT: -ear pain, -nasal congestion, -sore throat  Cardiovascular: -chest pain, -palpitations, -lower extremity edema  Respiratory: -cough, -shortness of breath  Gastrointestinal: -abdominal pain, -nausea, +vomiting, -diarrhea, -constipation, -blood in stool  Genitourinary: -dysuria, -hematuria, -frequency  Musculoskeletal: -joint pain, +muscle pain, +limb swelling, +difficulty walking, +pain with movement  Skin: -rash, -lesion, +slow wound healing  Neurological: -headache, -dizziness, -numbness, -tingling  Psychiatric: -anxiety, -depression, -sleep difficulty                Medical / Social / Family History     Past Medical History:   Diagnosis Date    ACC/AHA stage C congestive heart failure due to ischemic cardiomyopathy 03/12/2024    ACE inhibitor intolerance 03/12/2024    Alcohol dependence with uncomplicated withdrawal 07/09/2024    Broken shoulder 11/02/2024    right shoulder    CAD (coronary artery disease) 07/09/2024    Chronic deep vein thrombosis (DVT) of proximal vein of lower extremity, unspecified laterality 09/03/2024    CKD stage 3b, GFR 30-44 ml/min 07/11/2024    Diabetes mellitus, type 2     Episode of recurrent major depressive disorder, unspecified depression episode severity 09/03/2024    Factor 5 Leiden mutation, heterozygous     Familial hyperlipidemia 03/12/2024    Plan to (re) initiate crestor  Did not tolerate lipitor         Past Surgical History:   Procedure Laterality Date     ATHERECTOMY Bilateral 6/18/2025    Procedure: Atherectomy;  Surgeon: Ramo Diggs MD;  Location: [facility] CATH LAB;  Service: Cardiology;  Laterality: Bilateral;    ATHERECTOMY, LOWER ARTERIAL Bilateral 6/18/2025    Procedure: Atherectomy, Lower Arterial;  Surgeon: Ramo Diggs MD;  Location: [facility] CATH LAB;  Service: Cardiology;  Laterality: Bilateral;    ATHERECTOMY, PERIPHERAL BLOOD VESSEL Bilateral 6/18/2025    Procedure: ATHERECTOMY, PERIPHERAL BLOOD VESSEL;  Surgeon: Ramo Diggs MD;  Location: [facility] CATH LAB;  Service: Cardiology;  Laterality: Bilateral;    INSERTION OF STENT INTO PERIPHERAL VESSEL N/A 6/18/2025    Procedure: INSERTION, STENT, VESSEL, PERIPHERAL;  Surgeon: Ramo Diggs MD;  Location: [facility] CATH LAB;  Service: Cardiology;  Laterality: N/A;    INTRAVASCULAR ULTRASOUND, NON-CORONARY Bilateral 6/18/2025    Procedure: Intravascular Ultrasound, Non-Coronary;  Surgeon: Ramo Diggs MD;  Location: [facility] CATH LAB;  Service: Cardiology;  Laterality: Bilateral;    IVUS (INTRAVASCULAR ULTRASOUND) N/A 6/18/2025    Procedure: ULTRASOUND, INTRAVASCULAR;  Surgeon: Ramo Diggs MD;  Location: [facility] CATH LAB;  Service: Cardiology;  Laterality: N/A;    LEFT HEART CATHETERIZATION Left 3/4/2025    Procedure: Left heart cath;  Surgeon: Ramo Diggs MD;  Location: [facility] CATH LAB;  Service: Cardiology;  Laterality: Left;    REVERSE TOTAL SHOULDER ARTHROPLASTY Right 11/26/2024    Procedure: ARTHROPLASTY, SHOULDER, TOTAL, REVERSE;  Surgeon: Miguel Meyer MD;  Location: [facility];  Service: Orthopedics;  Laterality: Right;       Social History  Ms.  reports that she has been smoking cigarettes. She started smoking about 50 years ago. She has a 25 pack-year smoking history. She has never used smokeless tobacco. She reports that she does not currently use alcohol. She reports that she does not use drugs.    Family History  Ms.'s family  history includes Breast cancer in her mother; Cancer in her mother; Heart failure in her father.    Medications and Allergies     Medications  Outpatient Medications Marked as Taking for the 7/15/25 encounter (Office Visit) with Anabell Mendez NP   Medication Sig Dispense Refill    albuterol (PROVENTIL/VENTOLIN HFA) 90 mcg/actuation inhaler USE 2 INHALATIONS BY MOUTH EVERY 6 HOURS AS NEEDED FOR WHEEZING  FOR RESCUE 54 g 3    albuterol-ipratropium (DUO-NEB) 2.5 mg-0.5 mg/3 mL nebulizer solution USE 1 VIAL VIA NEBULIZER EVERY 6 HOURS AS NEEDED FOR WHEEZING  (FOR RESCUE) 810 mL 4    blood sugar diagnostic (ACCU-CHEK GUIDE TEST STRIPS) Strp 1 strip by Misc.(Non-Drug; Combo Route) route 3 (three) times daily. 100 strip 11    citalopram (CELEXA) 40 MG tablet Take 1 tablet (40 mg total) by mouth once daily. 90 tablet 3    clopidogreL (PLAVIX) 75 mg tablet Take 1 tablet (75 mg total) by mouth once daily. 90 tablet 3    cyclobenzaprine (FLEXERIL) 10 MG tablet Take 1 tablet (10 mg total) by mouth 3 (three) times daily as needed for Muscle spasms. 60 tablet 11    docusate sodium (COLACE) 100 MG capsule Take 2 capsules (200 mg total) by mouth once daily. 60 capsule 2    ENTRESTO 49-51 mg per tablet TAKE 1 TABLET BY MOUTH TWICE  DAILY 200 tablet 2    ezetimibe (ZETIA) 10 mg tablet Take 1 tablet (10 mg total) by mouth once daily. 90 tablet 2    FARXIGA 5 mg Tab tablet TAKE 1 TABLET BY MOUTH ONCE  DAILY 100 tablet 2    fexofenadine (ALLEGRA) 180 MG tablet Take 1 tablet (180 mg total) by mouth once daily. 90 tablet 3    furosemide (LASIX) 40 MG tablet Take 1 tablet (40 mg total) by mouth once daily. 90 tablet 3    gabapentin (NEURONTIN) 800 MG tablet TAKE 1 TABLET BY MOUTH 4 TIMES  DAILY AS NEEDED FOR NEUROPATHY 270 tablet 4    metoprolol succinate (TOPROL-XL) 25 MG 24 hr tablet Take 1 tablet (25 mg total) by mouth 2 (two) times daily. 180 tablet 2    rivaroxaban (XARELTO) 20 mg Tab Take 1 tablet (20 mg total) by mouth daily with  "dinner or evening meal. 90 tablet 2    rosuvastatin (CRESTOR) 40 MG Tab Take 1 tablet (40 mg total) by mouth every evening. 90 tablet 3    traZODone (DESYREL) 100 MG tablet Take 1 tablet (100 mg total) by mouth every evening. 90 tablet 3       Allergies  Review of patient's allergies indicates:  No Known Allergies    Physical Examination   /71 (BP Location: Left arm, Patient Position: Sitting)   Pulse 85   Temp 98.2 °F (36.8 °C) (Temporal)   Resp 20   Ht 5' 6" (1.676 m)   Wt 95.3 kg (210 lb)   SpO2 95%   BMI 33.89 kg/m²    Physical Exam    General: No acute distress. Well-developed. Well-nourished. Generalized edema including face.  Eyes: EOMI. Sclerae anicteric.  HENT: Normocephalic. Atraumatic. Nares patent. Moist oral mucosa.  Cardiovascular: Regular rate. Regular rhythm. No murmurs. No rubs. No gallops. Normal S1, S2.  Respiratory: Normal respiratory effort. Clear to auscultation bilaterally. No rales. No rhonchi. No wheezing.  Musculoskeletal: No  obvious deformity.  Extremities: No lower extremity edema.  Neurological: Alert & oriented x3. No slurred speech. Normal gait.  Psychiatric: Normal mood. Normal affect. Good insight. Good judgment.  Skin: Warm. Dry. No rash. Non-infected wound on leg.  MSK: Foot/Ankle - Left: Foot edema (Left). Ankle edema (Left).           Assessment and Plan (including Health Maintenance)      Problem List  Smart Sets  Document Outside HM   :    Assessment & Plan    - Examined skin ulcer on left calf that has not healed for a long time.  - Explained slower healing is due to diabetes and vascular issues.  - No signs of infection, redness, or increased warmth noted.  - Assessed that leg lesions are likely vascular-related rather than infected.  - Instructed patient to monitor for signs of infection (redness, warmth, increased drainage/weeping).  - Advised to contact office if wounds worsen and referred to wound care if needed.    - Patient is retaining significant amount " of water with generalized swelling, including face and legs.  - Noted localized swelling in left leg.  - No signs of infection or increased warmth found in any swollen areas.  - Advised to monitor swelling and seek further evaluation if condition worsens.    - Patient experiences pain and exhaustion while trying to build muscle and stamina.  - Encouraged to maintain physical activity within limitations, using pedal exerciser if walking is difficult.  - Discussed importance of continuing exercise despite difficulties and ensuring adequate protein intake to preserve lean muscle mass during weight loss.    - Prescribed anti-nausea medication with caution due to potential constipation.    - Patient has stopped drinking alcohol and does not consume it even when going out.    - Instructed patient to follow up with cardiology for cardiac management.    - Started Mounjaro at lowest dose for weight management with potential for slow titration.  - Explained mechanism of action, including its effect on digestion and potential for constipation.  - Prescribed antiemetic medication to be used as needed.  - Advised patient to eat 3 meals daily, even if small, while on Mounjaro.  - Recommend incorporating protein shakes to ensure adequate protein intake.    FOLLOW-UP:  - Follow up in 4 weeks to reassess Mounjaro dosage and tolerance.  - Contact the office if Mounjaro side effects become intolerable.     This note was generated with the assistance of ambient listening technology. Verbal consent was obtained by the patient and accompanying visitor(s) for the recording of patient appointment to facilitate this note. I attest to having reviewed and edited the generated note for accuracy, though some syntax or spelling errors may persist. Please contact the author of this note for any clarification.              Health Maintenance Due   Topic Date Due    Cervical Cancer Screening  Never done    Foot Exam  Never done    Diabetic Eye Exam   Never done    TETANUS VACCINE  Never done    DEXA Scan  Never done    Colorectal Cancer Screening  Never done    Shingles Vaccine (1 of 2) Never done    RSV Vaccine (Age 60+ and Pregnant patients) (1 - Risk 60-74 years 1-dose series) Never done    COVID-19 Vaccine (2 - 2024-25 season) 09/01/2024    LDCT Lung Screen  05/01/2025       Problem List Items Addressed This Visit    None  Visit Diagnoses         Type 2 diabetes mellitus with hyperglycemia, with long-term current use of insulin    -  Primary    Relevant Medications    tirzepatide (MOUNJARO) 2.5 mg/0.5 mL PnIj    tirzepatide (MOUNJARO) 5 mg/0.5 mL PnIj (Start on 8/13/2025)    tirzepatide 7.5 mg/0.5 mL PnIj (Start on 9/11/2025)      Nausea        Relevant Medications    ondansetron (ZOFRAN-ODT) 4 MG TbDL            Health Maintenance Topics with due status: Not Due       Topic Last Completion Date    Influenza Vaccine 11/28/2022    Diabetes Urine Screening 05/27/2025    Lipid Panel 05/27/2025    Hemoglobin A1c 05/27/2025    Mammogram 06/17/2025    High Dose Statin 07/15/2025       Future Appointments   Date Time Provider Department Center   7/17/2025  3:40 PM Ramo Diggs MD [facility]   9/17/2025  1:00 PM [facility]            Signature:  Anabell Mendez NP      [address]   [place] [address]    Date of encounter: 7/15/25

## 2025-08-12 DIAGNOSIS — E11.65 TYPE 2 DIABETES MELLITUS WITH HYPERGLYCEMIA, WITH LONG-TERM CURRENT USE OF INSULIN: ICD-10-CM

## 2025-08-12 DIAGNOSIS — Z79.4 TYPE 2 DIABETES MELLITUS WITH HYPERGLYCEMIA, WITH LONG-TERM CURRENT USE OF INSULIN: ICD-10-CM

## 2025-08-13 RX ORDER — TIRZEPATIDE 2.5 MG/.5ML
INJECTION, SOLUTION SUBCUTANEOUS
Qty: 4 ML | Refills: 0 | Status: SHIPPED | OUTPATIENT
Start: 2025-08-13

## 2025-08-14 DIAGNOSIS — I10 PRIMARY HYPERTENSION: ICD-10-CM

## 2025-08-14 DIAGNOSIS — E78.2 MIXED HYPERLIPIDEMIA: ICD-10-CM

## 2025-08-15 RX ORDER — EZETIMIBE 10 MG/1
10 TABLET ORAL
Qty: 70 TABLET | Refills: 4 | Status: SHIPPED | OUTPATIENT
Start: 2025-08-15

## 2025-08-15 RX ORDER — METOPROLOL SUCCINATE 25 MG/1
25 TABLET, EXTENDED RELEASE ORAL 2 TIMES DAILY
Qty: 140 TABLET | Refills: 4 | Status: SHIPPED | OUTPATIENT
Start: 2025-08-15

## 2025-08-25 ENCOUNTER — PATIENT MESSAGE (OUTPATIENT)
Facility: HOSPITAL | Age: 60
End: 2025-08-25
Payer: MEDICARE

## 2025-08-29 ENCOUNTER — OFFICE VISIT (OUTPATIENT)
Dept: CARDIOLOGY | Facility: CLINIC | Age: 60
End: 2025-08-29
Payer: MEDICARE

## 2025-08-29 VITALS
OXYGEN SATURATION: 96 % | BODY MASS INDEX: 38.79 KG/M2 | DIASTOLIC BLOOD PRESSURE: 70 MMHG | HEIGHT: 66 IN | HEART RATE: 74 BPM | WEIGHT: 241.38 LBS | SYSTOLIC BLOOD PRESSURE: 110 MMHG

## 2025-08-29 DIAGNOSIS — I25.5 ACC/AHA STAGE C CONGESTIVE HEART FAILURE DUE TO ISCHEMIC CARDIOMYOPATHY: ICD-10-CM

## 2025-08-29 DIAGNOSIS — I50.9 ACC/AHA STAGE C CONGESTIVE HEART FAILURE DUE TO ISCHEMIC CARDIOMYOPATHY: ICD-10-CM

## 2025-08-29 DIAGNOSIS — K59.00 CONSTIPATION, UNSPECIFIED CONSTIPATION TYPE: Primary | ICD-10-CM

## 2025-08-29 DIAGNOSIS — I50.42 CHRONIC COMBINED SYSTOLIC AND DIASTOLIC HEART FAILURE: ICD-10-CM

## 2025-08-29 PROCEDURE — 3078F DIAST BP <80 MM HG: CPT | Mod: CPTII,,, | Performed by: HOSPITALIST

## 2025-08-29 PROCEDURE — 99214 OFFICE O/P EST MOD 30 MIN: CPT | Mod: PBBFAC | Performed by: HOSPITALIST

## 2025-08-29 PROCEDURE — 3066F NEPHROPATHY DOC TX: CPT | Mod: CPTII,,, | Performed by: HOSPITALIST

## 2025-08-29 PROCEDURE — 3062F POS MACROALBUMINURIA REV: CPT | Mod: CPTII,,, | Performed by: HOSPITALIST

## 2025-08-29 PROCEDURE — 99999 PR PBB SHADOW E&M-EST. PATIENT-LVL IV: CPT | Mod: PBBFAC,,, | Performed by: HOSPITALIST

## 2025-08-29 PROCEDURE — 3008F BODY MASS INDEX DOCD: CPT | Mod: CPTII,,, | Performed by: HOSPITALIST

## 2025-08-29 PROCEDURE — 3046F HEMOGLOBIN A1C LEVEL >9.0%: CPT | Mod: CPTII,,, | Performed by: HOSPITALIST

## 2025-08-29 PROCEDURE — 4010F ACE/ARB THERAPY RXD/TAKEN: CPT | Mod: CPTII,,, | Performed by: HOSPITALIST

## 2025-08-29 PROCEDURE — 99215 OFFICE O/P EST HI 40 MIN: CPT | Mod: S$PBB,,, | Performed by: HOSPITALIST

## 2025-08-29 PROCEDURE — 3074F SYST BP LT 130 MM HG: CPT | Mod: CPTII,,, | Performed by: HOSPITALIST

## 2025-08-29 PROCEDURE — 1159F MED LIST DOCD IN RCRD: CPT | Mod: CPTII,,, | Performed by: HOSPITALIST

## 2025-08-29 RX ORDER — PLECANATIDE 3 MG/1
3 TABLET ORAL DAILY PRN
Qty: 30 TABLET | Refills: 11 | Status: SHIPPED | OUTPATIENT
Start: 2025-08-29

## 2025-08-29 RX ORDER — FUROSEMIDE 80 MG/1
80 TABLET ORAL 2 TIMES DAILY PRN
Qty: 180 TABLET | Refills: 3 | Status: SHIPPED | OUTPATIENT
Start: 2025-08-29 | End: 2025-08-29

## 2025-08-29 RX ORDER — FUROSEMIDE 80 MG/1
80 TABLET ORAL 2 TIMES DAILY PRN
Qty: 180 TABLET | Refills: 3 | Status: SHIPPED | OUTPATIENT
Start: 2025-08-29 | End: 2026-08-29

## 2025-08-29 RX ORDER — PLECANATIDE 3 MG/1
3 TABLET ORAL DAILY PRN
Qty: 30 TABLET | Refills: 11 | Status: SHIPPED | OUTPATIENT
Start: 2025-08-29 | End: 2025-08-29

## 2025-09-04 ENCOUNTER — OFFICE VISIT (OUTPATIENT)
Dept: FAMILY MEDICINE | Facility: CLINIC | Age: 60
End: 2025-09-04
Payer: MEDICARE

## 2025-09-04 VITALS
TEMPERATURE: 98 F | BODY MASS INDEX: 34.07 KG/M2 | SYSTOLIC BLOOD PRESSURE: 126 MMHG | HEART RATE: 69 BPM | DIASTOLIC BLOOD PRESSURE: 81 MMHG | HEIGHT: 66 IN | WEIGHT: 212 LBS | RESPIRATION RATE: 20 BRPM | OXYGEN SATURATION: 97 %

## 2025-09-04 DIAGNOSIS — Z13.29 SCREENING FOR THYROID DISORDER: ICD-10-CM

## 2025-09-04 DIAGNOSIS — Z12.2 SCREENING FOR LUNG CANCER: ICD-10-CM

## 2025-09-04 DIAGNOSIS — Z87.891 PERSONAL HISTORY OF NICOTINE DEPENDENCE: ICD-10-CM

## 2025-09-04 DIAGNOSIS — I50.9 CONGESTIVE HEART FAILURE, UNSPECIFIED HF CHRONICITY, UNSPECIFIED HEART FAILURE TYPE: ICD-10-CM

## 2025-09-04 DIAGNOSIS — M54.2 PAIN IN NECK: Primary | ICD-10-CM

## 2025-09-04 DIAGNOSIS — E11.65 TYPE 2 DIABETES MELLITUS WITH HYPERGLYCEMIA, WITH LONG-TERM CURRENT USE OF INSULIN: ICD-10-CM

## 2025-09-04 DIAGNOSIS — I10 PRIMARY HYPERTENSION: ICD-10-CM

## 2025-09-04 DIAGNOSIS — Z79.4 TYPE 2 DIABETES MELLITUS WITH HYPERGLYCEMIA, WITH LONG-TERM CURRENT USE OF INSULIN: ICD-10-CM

## 2025-09-04 DIAGNOSIS — Z78.0 POST-MENOPAUSE: ICD-10-CM

## 2025-09-04 DIAGNOSIS — E78.2 MIXED HYPERLIPIDEMIA: ICD-10-CM

## 2025-09-04 LAB
ALBUMIN SERPL BCP-MCNC: 3.3 G/DL (ref 3.4–4.8)
ALBUMIN/GLOB SERPL: 0.9 {RATIO}
ALP SERPL-CCNC: 70 U/L (ref 40–150)
ALT SERPL W P-5'-P-CCNC: 7 U/L
ANION GAP SERPL CALCULATED.3IONS-SCNC: 13 MMOL/L (ref 7–16)
AST SERPL W P-5'-P-CCNC: 14 U/L (ref 11–45)
BASOPHILS # BLD AUTO: 0.06 K/UL (ref 0–0.2)
BASOPHILS NFR BLD AUTO: 0.7 % (ref 0–1)
BILIRUB SERPL-MCNC: 0.9 MG/DL
BUN SERPL-MCNC: 17 MG/DL (ref 10–20)
BUN/CREAT SERPL: 13 (ref 6–20)
CALCIUM SERPL-MCNC: 9.4 MG/DL (ref 8.4–10.2)
CHLORIDE SERPL-SCNC: 101 MMOL/L (ref 98–107)
CHOLEST SERPL-MCNC: 147 MG/DL
CHOLEST/HDLC SERPL: 3 {RATIO}
CO2 SERPL-SCNC: 31 MMOL/L (ref 23–31)
CREAT SERPL-MCNC: 1.31 MG/DL (ref 0.55–1.02)
DIFFERENTIAL METHOD BLD: ABNORMAL
EGFR (NO RACE VARIABLE) (RUSH/TITUS): 46 ML/MIN/1.73M2
EOSINOPHIL # BLD AUTO: 0.19 K/UL (ref 0–0.5)
EOSINOPHIL NFR BLD AUTO: 2.2 % (ref 1–4)
ERYTHROCYTE [DISTWIDTH] IN BLOOD BY AUTOMATED COUNT: 14.2 % (ref 11.5–14.5)
EST. AVERAGE GLUCOSE BLD GHB EST-MCNC: 200 MG/DL
GLOBULIN SER-MCNC: 3.7 G/DL (ref 2–4)
GLUCOSE SERPL-MCNC: 190 MG/DL (ref 82–115)
HBA1C MFR BLD HPLC: 8.6 %
HCT VFR BLD AUTO: 34.3 % (ref 38–47)
HDLC SERPL-MCNC: 49 MG/DL (ref 35–60)
HGB BLD-MCNC: 10.7 G/DL (ref 12–16)
IMM GRANULOCYTES # BLD AUTO: 0.03 K/UL (ref 0–0.04)
IMM GRANULOCYTES NFR BLD: 0.4 % (ref 0–0.4)
LDLC SERPL CALC-MCNC: 69 MG/DL
LDLC/HDLC SERPL: 1.4 {RATIO}
LYMPHOCYTES # BLD AUTO: 0.82 K/UL (ref 1–4.8)
LYMPHOCYTES NFR BLD AUTO: 9.7 % (ref 27–41)
MCH RBC QN AUTO: 27 PG (ref 27–31)
MCHC RBC AUTO-ENTMCNC: 31.2 G/DL (ref 32–36)
MCV RBC AUTO: 86.4 FL (ref 80–96)
MONOCYTES # BLD AUTO: 0.5 K/UL (ref 0–0.8)
MONOCYTES NFR BLD AUTO: 5.9 % (ref 2–6)
MPC BLD CALC-MCNC: 11 FL (ref 9.4–12.4)
NEUTROPHILS # BLD AUTO: 6.85 K/UL (ref 1.8–7.7)
NEUTROPHILS NFR BLD AUTO: 81.1 % (ref 53–65)
NONHDLC SERPL-MCNC: 98 MG/DL
NRBC # BLD AUTO: 0 X10E3/UL
NRBC, AUTO (.00): 0 %
PLATELET # BLD AUTO: 296 K/UL (ref 150–400)
POTASSIUM SERPL-SCNC: 4.4 MMOL/L (ref 3.5–5.1)
PROT SERPL-MCNC: 7 G/DL (ref 5.8–7.6)
RBC # BLD AUTO: 3.97 M/UL (ref 4.2–5.4)
SODIUM SERPL-SCNC: 141 MMOL/L (ref 136–145)
TRIGL SERPL-MCNC: 145 MG/DL (ref 37–140)
TSH SERPL DL<=0.005 MIU/L-ACNC: 0.56 UIU/ML (ref 0.35–4.94)
VLDLC SERPL-MCNC: 29 MG/DL
WBC # BLD AUTO: 8.45 K/UL (ref 4.5–11)

## 2025-09-04 RX ORDER — METHYLPREDNISOLONE ACETATE 40 MG/ML
40 INJECTION, SUSPENSION INTRA-ARTICULAR; INTRALESIONAL; INTRAMUSCULAR; SOFT TISSUE
Status: COMPLETED | OUTPATIENT
Start: 2025-09-04 | End: 2025-09-04

## 2025-09-04 RX ORDER — DEXAMETHASONE SODIUM PHOSPHATE 4 MG/ML
8 INJECTION, SOLUTION INTRA-ARTICULAR; INTRALESIONAL; INTRAMUSCULAR; INTRAVENOUS; SOFT TISSUE
Status: DISCONTINUED | OUTPATIENT
Start: 2025-09-04 | End: 2025-09-04

## 2025-09-04 RX ORDER — KETOROLAC TROMETHAMINE 30 MG/ML
60 INJECTION, SOLUTION INTRAMUSCULAR; INTRAVENOUS
Status: COMPLETED | OUTPATIENT
Start: 2025-09-04 | End: 2025-09-04

## 2025-09-04 RX ORDER — DEXAMETHASONE SODIUM PHOSPHATE 4 MG/ML
4 INJECTION, SOLUTION INTRA-ARTICULAR; INTRALESIONAL; INTRAMUSCULAR; INTRAVENOUS; SOFT TISSUE
Status: COMPLETED | OUTPATIENT
Start: 2025-09-04 | End: 2025-09-04

## 2025-09-04 RX ADMIN — DEXAMETHASONE SODIUM PHOSPHATE 4 MG: 4 INJECTION, SOLUTION INTRA-ARTICULAR; INTRALESIONAL; INTRAMUSCULAR; INTRAVENOUS; SOFT TISSUE at 03:09

## 2025-09-04 RX ADMIN — KETOROLAC TROMETHAMINE 60 MG: 30 INJECTION, SOLUTION INTRAMUSCULAR; INTRAVENOUS at 03:09

## 2025-09-04 RX ADMIN — METHYLPREDNISOLONE ACETATE 40 MG: 40 INJECTION, SUSPENSION INTRA-ARTICULAR; INTRALESIONAL; INTRAMUSCULAR; SOFT TISSUE at 03:09

## 2025-09-05 LAB — GLUCOSE SERPL-MCNC: 218 MG/DL (ref 70–110)

## 2025-09-05 RX ORDER — TIRZEPATIDE 5 MG/.5ML
5 INJECTION, SOLUTION SUBCUTANEOUS
Qty: 2 ML | Refills: 0 | Status: SHIPPED | OUTPATIENT
Start: 2025-09-05 | End: 2025-10-03

## (undated) DEVICE — Device

## (undated) DEVICE — INTRODUCER KIT MICRO 4FR

## (undated) DEVICE — SPONGE LAP MASTER 12X12IN

## (undated) DEVICE — GLOVE SENSICARE PI GRN 7

## (undated) DEVICE — GLOVE SENSICARE PI SURG 8

## (undated) DEVICE — GLOVE SENSICARE PI SURG 7.5

## (undated) DEVICE — GLOVE SURG ULTRA TOUCH 6

## (undated) DEVICE — GOWN POLY REINF BRTH SLV XL

## (undated) DEVICE — OXISENSOR ADULT DIGIT N/S

## (undated) DEVICE — CATH INTROCAN SAF 2 IV 22GX1IN

## (undated) DEVICE — SET FLD DEL LG BORE SPIK 72IN

## (undated) DEVICE — SHEATH INTRODUCER 6FR 11CM

## (undated) DEVICE — BIT DRILL 3.1MM

## (undated) DEVICE — CATH MARINER OMNI FLSH 5F 65CM

## (undated) DEVICE — PACK ECLIPSE BASIC III SURG

## (undated) DEVICE — CATH LUTONIX DCB 018X130X6X40

## (undated) DEVICE — GUIDEWIRE ANGLED .035 150CM

## (undated) DEVICE — SUT VICRYL CTD 1 27IN CP

## (undated) DEVICE — CATH LUTONIX DCB 018X130X5X300

## (undated) DEVICE — SOL NACL IRR 1000ML BTL

## (undated) DEVICE — SET EXT W/2 VLVE PORTS 40

## (undated) DEVICE — CLOTH READYPREP 2%CHG 9X10.5IN

## (undated) DEVICE — SET IV PRIMARY

## (undated) DEVICE — TOWEL OR DISP STRL BLUE 4/PK

## (undated) DEVICE — PRESTO INFLATION DEVICE

## (undated) DEVICE — CHLORAPREP 10.5 ML APPLICATOR

## (undated) DEVICE — NITINOL PILOT WIRE
Type: IMPLANTABLE DEVICE | Site: SHOULDER | Status: NON-FUNCTIONAL
Brand: REUNION
Removed: 2024-11-26

## (undated) DEVICE — GLOVE SENSICARE PI GRN 8

## (undated) DEVICE — SHEATH DESTINATION 6FR 45CM

## (undated) DEVICE — SPONGE COTTON TRAY 4X4IN

## (undated) DEVICE — SET EXT IV CATH ONE LINK 8.5IN

## (undated) DEVICE — PIN 3.2MM 4 KANT SQUARE
Type: IMPLANTABLE DEVICE | Site: SHOULDER | Status: NON-FUNCTIONAL
Removed: 2024-11-26

## (undated) DEVICE — SUT BLU BR 2 TAPERD NDL 1/2

## (undated) DEVICE — DRESSING TRANS 4X4 TEGADERM

## (undated) DEVICE — ETCO2 NC MICROSTR FEM ST ADLT

## (undated) DEVICE — KIT IV START

## (undated) DEVICE — DRESSING TRANS 6X8 TEGADERM

## (undated) DEVICE — CUFF BP SOFT LARGE ADULT

## (undated) DEVICE — GLOVE SENSICARE PI SURG 6.5

## (undated) DEVICE — CATH VISIONS PV RX IVUS .014P

## (undated) DEVICE — CUFF BP 1T BAYNT  ADLT LG LNG

## (undated) DEVICE — CATH INTROCAN SAF 2 IV 20GX1IN

## (undated) DEVICE — CATH DIAG IMPULSE 6FR FR4

## (undated) DEVICE — GLOVE SENSICARE PI SURG 7

## (undated) DEVICE — SUT VICRYL 2-0 36 CT-1

## (undated) DEVICE — PROTECTOR ULNAR NERVE FOAM

## (undated) DEVICE — GUIDEWIRE RUNTHROUGH EF 300CM

## (undated) DEVICE — APPLICATOR CHLORAPREP ORN 26ML

## (undated) DEVICE — CLIPPER BLADE MOD 4406 (CAREF)

## (undated) DEVICE — CATH DIAG IMPULSE 6FR FL4

## (undated) DEVICE — SUT 2-0 12-18IN SILK

## (undated) DEVICE — SLING ARM VOGUE STRP BLU MED

## (undated) DEVICE — TOWEL OR XRAY BLUE 17X26IN

## (undated) DEVICE — DECANTER FLUID TRNSF WHITE 9IN

## (undated) DEVICE — BOWL MIXING BONE CEMENT

## (undated) DEVICE — LEADWIRE DL DC EKG 10 PIN

## (undated) DEVICE — BLADE SAW SGTTL 18.6X.8X73.8MM

## (undated) DEVICE — COVER PROBE US GEL BAND

## (undated) DEVICE — CONTRAST ISOVUE 370 100ML

## (undated) DEVICE — STAPLER SKIN WIDE

## (undated) DEVICE — KIT Y-ADAPTER W/ INSERT TORQUE

## (undated) DEVICE — PAD ABDOMINAL 8X7.5 STERILE

## (undated) DEVICE — PAD RADIOLUCENT STAT ADULT

## (undated) DEVICE — GLOVE SENSICARE PI GRN 7.5